# Patient Record
Sex: FEMALE | Race: WHITE | HISPANIC OR LATINO | Employment: UNEMPLOYED | URBAN - METROPOLITAN AREA
[De-identification: names, ages, dates, MRNs, and addresses within clinical notes are randomized per-mention and may not be internally consistent; named-entity substitution may affect disease eponyms.]

---

## 2023-12-02 ENCOUNTER — APPOINTMENT (OUTPATIENT)
Dept: ULTRASOUND IMAGING | Facility: CLINIC | Age: 23
End: 2023-12-02
Attending: FAMILY MEDICINE

## 2023-12-02 ENCOUNTER — HOSPITAL ENCOUNTER (EMERGENCY)
Facility: CLINIC | Age: 23
Discharge: HOME | End: 2023-12-02
Attending: FAMILY MEDICINE | Admitting: FAMILY MEDICINE

## 2023-12-02 VITALS
SYSTOLIC BLOOD PRESSURE: 105 MMHG | TEMPERATURE: 98.8 F | OXYGEN SATURATION: 99 % | WEIGHT: 138.8 LBS | DIASTOLIC BLOOD PRESSURE: 60 MMHG | RESPIRATION RATE: 16 BRPM | HEART RATE: 78 BPM

## 2023-12-02 DIAGNOSIS — Z3A.01 LESS THAN 8 WEEKS GESTATION OF PREGNANCY: ICD-10-CM

## 2023-12-02 LAB
ALBUMIN SERPL BCG-MCNC: 4.5 G/DL (ref 3.5–5.2)
ALBUMIN UR-MCNC: NEGATIVE MG/DL
ALP SERPL-CCNC: 62 U/L (ref 40–150)
ALT SERPL W P-5'-P-CCNC: 16 U/L (ref 0–50)
ANION GAP SERPL CALCULATED.3IONS-SCNC: 8 MMOL/L (ref 7–15)
APPEARANCE UR: CLEAR
AST SERPL W P-5'-P-CCNC: 20 U/L (ref 0–45)
BASOPHILS # BLD AUTO: 0.1 10E3/UL (ref 0–0.2)
BASOPHILS NFR BLD AUTO: 1 %
BILIRUB SERPL-MCNC: 0.3 MG/DL
BILIRUB UR QL STRIP: NEGATIVE
BUN SERPL-MCNC: 8.8 MG/DL (ref 6–20)
CALCIUM SERPL-MCNC: 9.1 MG/DL (ref 8.6–10)
CHLORIDE SERPL-SCNC: 104 MMOL/L (ref 98–107)
CLUE CELLS: ABNORMAL
COLOR UR AUTO: ABNORMAL
CREAT SERPL-MCNC: 0.52 MG/DL (ref 0.51–0.95)
DEPRECATED HCO3 PLAS-SCNC: 25 MMOL/L (ref 22–29)
EGFRCR SERPLBLD CKD-EPI 2021: >90 ML/MIN/1.73M2
EOSINOPHIL # BLD AUTO: 0.2 10E3/UL (ref 0–0.7)
EOSINOPHIL NFR BLD AUTO: 2 %
ERYTHROCYTE [DISTWIDTH] IN BLOOD BY AUTOMATED COUNT: 12.4 % (ref 10–15)
GLUCOSE SERPL-MCNC: 93 MG/DL (ref 70–99)
GLUCOSE UR STRIP-MCNC: NEGATIVE MG/DL
HCG INTACT+B SERPL-ACNC: 4186 MIU/ML
HCT VFR BLD AUTO: 36.8 % (ref 35–47)
HGB BLD-MCNC: 12.5 G/DL (ref 11.7–15.7)
HGB UR QL STRIP: NEGATIVE
IMM GRANULOCYTES # BLD: 0 10E3/UL
IMM GRANULOCYTES NFR BLD: 0 %
KETONES UR STRIP-MCNC: NEGATIVE MG/DL
LEUKOCYTE ESTERASE UR QL STRIP: NEGATIVE
LYMPHOCYTES # BLD AUTO: 2.7 10E3/UL (ref 0.8–5.3)
LYMPHOCYTES NFR BLD AUTO: 27 %
MCH RBC QN AUTO: 32 PG (ref 26.5–33)
MCHC RBC AUTO-ENTMCNC: 34 G/DL (ref 31.5–36.5)
MCV RBC AUTO: 94 FL (ref 78–100)
MONOCYTES # BLD AUTO: 0.7 10E3/UL (ref 0–1.3)
MONOCYTES NFR BLD AUTO: 7 %
MUCOUS THREADS #/AREA URNS LPF: PRESENT /LPF
NEUTROPHILS # BLD AUTO: 6.6 10E3/UL (ref 1.6–8.3)
NEUTROPHILS NFR BLD AUTO: 63 %
NITRATE UR QL: NEGATIVE
NRBC # BLD AUTO: 0 10E3/UL
NRBC BLD AUTO-RTO: 0 /100
PH UR STRIP: 6.5 [PH] (ref 5–7)
PLATELET # BLD AUTO: 302 10E3/UL (ref 150–450)
POTASSIUM SERPL-SCNC: 3.6 MMOL/L (ref 3.4–5.3)
PROT SERPL-MCNC: 7.5 G/DL (ref 6.4–8.3)
RBC # BLD AUTO: 3.91 10E6/UL (ref 3.8–5.2)
RBC URINE: 0 /HPF
SODIUM SERPL-SCNC: 137 MMOL/L (ref 135–145)
SP GR UR STRIP: 1.02 (ref 1–1.03)
SQUAMOUS EPITHELIAL: 8 /HPF
TRICHOMONAS, WET PREP: ABNORMAL
UROBILINOGEN UR STRIP-MCNC: NORMAL MG/DL
WBC # BLD AUTO: 10.2 10E3/UL (ref 4–11)
WBC URINE: <1 /HPF
WBC'S/HIGH POWER FIELD, WET PREP: ABNORMAL
YEAST, WET PREP: ABNORMAL

## 2023-12-02 PROCEDURE — 87210 SMEAR WET MOUNT SALINE/INK: CPT | Performed by: FAMILY MEDICINE

## 2023-12-02 PROCEDURE — 36415 COLL VENOUS BLD VENIPUNCTURE: CPT | Performed by: FAMILY MEDICINE

## 2023-12-02 PROCEDURE — 80053 COMPREHEN METABOLIC PANEL: CPT | Performed by: FAMILY MEDICINE

## 2023-12-02 PROCEDURE — 99284 EMERGENCY DEPT VISIT MOD MDM: CPT | Performed by: FAMILY MEDICINE

## 2023-12-02 PROCEDURE — 87591 N.GONORRHOEAE DNA AMP PROB: CPT | Performed by: FAMILY MEDICINE

## 2023-12-02 PROCEDURE — 76801 OB US < 14 WKS SINGLE FETUS: CPT

## 2023-12-02 PROCEDURE — 87491 CHLMYD TRACH DNA AMP PROBE: CPT | Performed by: FAMILY MEDICINE

## 2023-12-02 PROCEDURE — 84702 CHORIONIC GONADOTROPIN TEST: CPT | Performed by: FAMILY MEDICINE

## 2023-12-02 PROCEDURE — 85014 HEMATOCRIT: CPT | Performed by: FAMILY MEDICINE

## 2023-12-02 PROCEDURE — 99284 EMERGENCY DEPT VISIT MOD MDM: CPT | Mod: 25 | Performed by: FAMILY MEDICINE

## 2023-12-02 PROCEDURE — 81001 URINALYSIS AUTO W/SCOPE: CPT | Performed by: FAMILY MEDICINE

## 2023-12-02 ASSESSMENT — ENCOUNTER SYMPTOMS: PREGNANCY PROBLEM: 1

## 2023-12-02 ASSESSMENT — ACTIVITIES OF DAILY LIVING (ADL)
ADLS_ACUITY_SCORE: 35
ADLS_ACUITY_SCORE: 35

## 2023-12-03 LAB
C TRACH DNA SPEC QL NAA+PROBE: NEGATIVE
N GONORRHOEA DNA SPEC QL NAA+PROBE: NEGATIVE

## 2023-12-03 NOTE — ED PROVIDER NOTES
ED Provider Note  [facility]      History     Chief Complaint   Patient presents with    Abdominal Pain    Pregnancy Complications     The history is provided by the patient and medical records.   Pregnancy Complications      Ann Lemus is a 23 year old female with a PMHx of ectopic pregnancy with right salpingectomy presents to the ED for abdominal pain.  Patient reports a positive pregnancy test 2 days ago.     services used at bedside.  Patient states that she had a positive pregnancy test yesterday, and is now having a lot of pain where she had her surgery, in her lower abdomen and lower back.  Patient had a right salpingectomy 3 years ago, got pregnant a year after the surgery, but had a miscarriage.  Patient states that the doctor told her if she got pregnant she had to be checked out due to her history of ectopic pregnancy and miscarriage.    Past Medical History  No past medical history on file.  No past surgical history on file.  No current outpatient medications on file.    Allergies   Allergen Reactions    Amoxicillin      Family History  No family history on file.  Social History          A medically appropriate review of systems was performed with pertinent positives and negatives noted in the HPI, and all other systems negative.    Physical Exam   BP: 106/54  Pulse: 82  Temp: 98.8  F (37.1  C)  Resp: 16  Weight: 63 kg (138 lb 12.8 oz)  SpO2: 98 %  Physical Exam  Constitutional:       General: She is not in acute distress.     Appearance: Normal appearance. She is not diaphoretic.   HENT:      Head: Atraumatic.      Mouth/Throat:      Mouth: Mucous membranes are moist.   Eyes:      General: No scleral icterus.     Conjunctiva/sclera: Conjunctivae normal.   Cardiovascular:      Rate and Rhythm: Normal rate.      Heart sounds: Normal heart sounds.   Pulmonary:      Effort: No respiratory distress.      Breath sounds: Normal breath sounds.    Abdominal:      General: Abdomen is flat.      Tenderness: There is abdominal tenderness in the suprapubic area. There is no guarding or rebound.      Comments: Minimal tenderness in the suprapubic region with no guarding or rebound noted.   Genitourinary:     Vagina: Normal.      Cervix: No cervical motion tenderness.      Uterus: Tender.       Adnexa:         Right: No mass or tenderness.          Left: No mass or tenderness.     Musculoskeletal:      Cervical back: Neck supple.   Skin:     General: Skin is warm.      Findings: No rash.   Neurological:      Mental Status: She is alert.           ED Course, Procedures, & Data      Procedures     Results for orders placed or performed during the hospital encounter of 12/02/23   US OB <14 Weeks W Transvaginal     Status: None    Narrative    EXAM: US OB <14 WEEKS WITH TRANSVAGINAL SINGLE  LOCATION: [facility]  DATE: 12/2/2023    INDICATION: Pelvic pain.  COMPARISON: None.  TECHNIQUE: Transabdominal scans were performed. Endovaginal ultrasound was performed to better visualize the embryo.    FINDINGS:  UTERUS: Anteverted gravid uterus with a single intrauterine gestational sac, mean gestational sac diameter measures 0.6 cm corresponding to 5 weeks 1 day. Yolk sac identified. No fetal pole or cardiac activity at this stage of gestation. Recommend   clinical correlation and close follow-up.  CRL: Measures not yet visualized.  FETAL HEART RATE: Not yet visualized.  AMNIOTIC FLUID: Normal.  PLACENTA: Not yet visualized.    RIGHT OVARY: Normal measuring 1.8 x 3.4 x 1.7 cm, with normal Doppler vascularity.  LEFT OVARY: Normal measuring 2.7 x 5.4 x 1.7 cm containing a corpus luteum measuring 1.4 x 1.7 x 1.4 cm. Normal Doppler vascularity.    Trace pelvic free fluid.      Impression    IMPRESSION:   1.  Tiny intrauterine gestational sac without a fetal pole or cardiac activity at this stage of early gestation. Yolk sac  identified. Recommend clinical correlation and close follow-up.    2.  Both ovaries are normal, with normal Doppler vascularity. Corpus luteum on the left.    3.  Trace amount of pelvic free fluid.       Comprehensive metabolic panel     Status: Normal   Result Value Ref Range    Sodium 137 135 - 145 mmol/L    Potassium 3.6 3.4 - 5.3 mmol/L    Carbon Dioxide (CO2) 25 22 - 29 mmol/L    Anion Gap 8 7 - 15 mmol/L    Urea Nitrogen 8.8 6.0 - 20.0 mg/dL    Creatinine 0.52 0.51 - 0.95 mg/dL    GFR Estimate >90 >60 mL/min/1.73m2    Calcium 9.1 8.6 - 10.0 mg/dL    Chloride 104 98 - 107 mmol/L    Glucose 93 70 - 99 mg/dL    Alkaline Phosphatase 62 40 - 150 U/L    AST 20 0 - 45 U/L    ALT 16 0 - 50 U/L    Protein Total 7.5 6.4 - 8.3 g/dL    Albumin 4.5 3.5 - 5.2 g/dL    Bilirubin Total 0.3 <=1.2 mg/dL   HCG quantitative pregnancy     Status: Abnormal   Result Value Ref Range    hCG Quantitative 4,186 (H) <5 mIU/mL   UA with Microscopic reflex to Culture     Status: Abnormal    Specimen: Urine, Midstream   Result Value Ref Range    Color Urine Light Yellow Colorless, Straw, Light Yellow, Yellow    Appearance Urine Clear Clear    Glucose Urine Negative Negative mg/dL    Bilirubin Urine Negative Negative    Ketones Urine Negative Negative mg/dL    Specific Gravity Urine 1.016 1.003 - 1.035    Blood Urine Negative Negative    pH Urine 6.5 5.0 - 7.0    Protein Albumin Urine Negative Negative mg/dL    Urobilinogen Urine Normal Normal, 2.0 mg/dL    Nitrite Urine Negative Negative    Leukocyte Esterase Urine Negative Negative    Mucus Urine Present (A) None Seen /LPF    RBC Urine 0 <=2 /HPF    WBC Urine <1 <=5 /HPF    Squamous Epithelials Urine 8 (H) <=1 /HPF    Narrative    Urine Culture not indicated   CBC with platelets and differential     Status: None   Result Value Ref Range    WBC Count 10.2 4.0 - 11.0 10e3/uL    RBC Count 3.91 3.80 - 5.20 10e6/uL    Hemoglobin 12.5 11.7 - 15.7 g/dL    Hematocrit 36.8 35.0 - 47.0 %    MCV 94 78  - 100 fL    MCH 32.0 26.5 - 33.0 pg    MCHC 34.0 31.5 - 36.5 g/dL    RDW 12.4 10.0 - 15.0 %    Platelet Count 302 150 - 450 10e3/uL    % Neutrophils 63 %    % Lymphocytes 27 %    % Monocytes 7 %    % Eosinophils 2 %    % Basophils 1 %    % Immature Granulocytes 0 %    NRBCs per 100 WBC 0 <1 /100    Absolute Neutrophils 6.6 1.6 - 8.3 10e3/uL    Absolute Lymphocytes 2.7 0.8 - 5.3 10e3/uL    Absolute Monocytes 0.7 0.0 - 1.3 10e3/uL    Absolute Eosinophils 0.2 0.0 - 0.7 10e3/uL    Absolute Basophils 0.1 0.0 - 0.2 10e3/uL    Absolute Immature Granulocytes 0.0 <=0.4 10e3/uL    Absolute NRBCs 0.0 10e3/uL   Wet prep     Status: Abnormal    Specimen: Vagina; Swab   Result Value Ref Range    Trichomonas Absent Absent    Yeast Absent Absent    Clue Cells Absent Absent    WBCs/high power field 1+ (A) None   CBC with platelets differential     Status: None    Narrative    The following orders were created for panel order CBC with platelets differential.  Procedure                               Abnormality         Status                     ---------                               -----------         ------                     CBC with platelets and d...[[id number]]                      Final result                 Please view results for these tests on the individual orders.     Medications - No data to display  Labs Ordered and Resulted from Time of ED Arrival to Time of ED Departure   HCG QUANTITATIVE PREGNANCY - Abnormal       Result Value    hCG Quantitative 4,186 (*)    ROUTINE UA WITH MICROSCOPIC REFLEX TO CULTURE - Abnormal    Color Urine Light Yellow      Appearance Urine Clear      Glucose Urine Negative      Bilirubin Urine Negative      Ketones Urine Negative      Specific Gravity Urine 1.016      Blood Urine Negative      pH Urine 6.5      Protein Albumin Urine Negative      Urobilinogen Urine Normal      Nitrite Urine Negative      Leukocyte Esterase Urine Negative      Mucus Urine Present (*)     RBC Urine 0      WBC  Urine <1      Squamous Epithelials Urine 8 (*)    WET PREPARATION - Abnormal    Trichomonas Absent      Yeast Absent      Clue Cells Absent      WBCs/high power field 1+ (*)    COMPREHENSIVE METABOLIC PANEL - Normal    Sodium 137      Potassium 3.6      Carbon Dioxide (CO2) 25      Anion Gap 8      Urea Nitrogen 8.8      Creatinine 0.52      GFR Estimate >90      Calcium 9.1      Chloride 104      Glucose 93      Alkaline Phosphatase 62      AST 20      ALT 16      Protein Total 7.5      Albumin 4.5      Bilirubin Total 0.3     CBC WITH PLATELETS AND DIFFERENTIAL    WBC Count 10.2      RBC Count 3.91      Hemoglobin 12.5      Hematocrit 36.8      MCV 94      MCH 32.0      MCHC 34.0      RDW 12.4      Platelet Count 302      % Neutrophils 63      % Lymphocytes 27      % Monocytes 7      % Eosinophils 2      % Basophils 1      % Immature Granulocytes 0      NRBCs per 100 WBC 0      Absolute Neutrophils 6.6      Absolute Lymphocytes 2.7      Absolute Monocytes 0.7      Absolute Eosinophils 0.2      Absolute Basophils 0.1      Absolute Immature Granulocytes 0.0      Absolute NRBCs 0.0     CHLAMYDIA TRACHOMATIS PCR   NEISSERIA GONORRHOEAE PCR     US OB <14 Weeks W Transvaginal   Final Result   IMPRESSION:    1.  Tiny intrauterine gestational sac without a fetal pole or cardiac activity at this stage of early gestation. Yolk sac identified. Recommend clinical correlation and close follow-up.      2.  Both ovaries are normal, with normal Doppler vascularity. Corpus luteum on the left.      3.  Trace amount of pelvic free fluid.                   Critical care was not performed.     Medical Decision Making  The patient's presentation was of moderate complexity (an acute illness with systemic symptoms).    The patient's evaluation involved:  ordering and/or review of 3+ test(s) in this encounter (see separate area of note for details)    The patient's management necessitated only low risk treatment.    Assessment & Plan         I have reviewed the nursing notes. I have reviewed the findings, diagnosis, plan and need for follow up with the patient.    Patient in 5 weeks gestation with mild suprapubic pain at this time concern for ectopic pregnancy versus possible early miscarriage patient has had both ectopic pregnancy and miscarriage and is concerned about these however at this time too early to determine with this pregnancy patient understands importance of close follow-up with recheck of hCG in 2 days and follow-up ultrasound in approximately 1 to 2 weeks.  Return to the emergency room if increased pain or bleeding.    Final diagnoses:   Less than 8 weeks gestation of pregnancy   I, Gonzalo Brock, am serving as a trained medical scribe to document services personally performed by Isaiah Wu MD, based to the provider's statements to me.     I, Isaiah Wu MD, was physically present and have reviewed and verified the accuracy of this note documented by Gonzalo Brock.     Isaiah Wu MD  [facility] EMERGENCY DEPARTMENT  12/2/2023     Isaiah Wu MD  12/02/23 [id number]

## 2023-12-03 NOTE — DISCHARGE INSTRUCTIONS
Discharge to home plan on follow-up Monday for recheck of your beta hCG hormone level.  Return if marked increase in pain or any bleeding.

## 2023-12-03 NOTE — ED TRIAGE NOTES
Pt reports  positive  home pregnancy test two days ago.  Abdominal Pain started yesterday which has worsen . Pt report  she had an ectopic pregnancy in R ovary two years.ago..which led to the surgical  removal of her R ovary and tube     Triage Assessment (Adult)       Row Name 12/02/23 2039          Triage Assessment    Airway WDL WDL        Respiratory WDL    Respiratory WDL WDL        Skin Circulation/Temperature WDL    Skin Circulation/Temperature WDL WDL        Cardiac WDL    Cardiac WDL WDL        Peripheral/Neurovascular WDL    Peripheral Neurovascular WDL WDL        Cognitive/Neuro/Behavioral WDL    Cognitive/Neuro/Behavioral WDL WDL

## 2023-12-04 ENCOUNTER — APPOINTMENT (OUTPATIENT)
Dept: INTERPRETER SERVICES | Facility: CLINIC | Age: 23
End: 2023-12-04
Payer: MEDICAID

## 2023-12-04 ENCOUNTER — TELEPHONE (OUTPATIENT)
Dept: OBGYN | Facility: CLINIC | Age: 23
End: 2023-12-04
Payer: MEDICAID

## 2023-12-04 DIAGNOSIS — Z32.01 PREGNANCY TEST POSITIVE: Primary | ICD-10-CM

## 2023-12-04 NOTE — TELEPHONE ENCOUNTER
Per 12/2 ED note, pt needs to have HCG rechecked today, 12/4. No further lab orders on file. Ordered HCG.     ED also recommended ultrasound follow-up in 1-2 weeks. Ordered US.    Called pt to let her know she needs to get her BHCG drawn today and to schedule her initial prenatal appts. Pt had gone to a lab today that was not a [facility] lab. Gave her the address + hours for the outpatient lab; she will try to go today before it closes.  Scheduled pt for OBI, US, and CNM appt.

## 2023-12-04 NOTE — TELEPHONE ENCOUNTER
[facility] Call Center    Phone Message    May a detailed message be left on voicemail: yes     Reason for Call: Appointment Intake    Referring Provider Name: Isaiah Wu MD   Diagnosis and/or Symptoms: pt has a referral for her first prenatal visit, writer unsure how to schedule as ED recommended 1-2 day follow up due to possible ectopic pregnancy/hx of miscarriage , please call pt to discuss, thank you    Action Taken: Message routed to:  Other: whs    Travel Screening: Not Applicable

## 2023-12-05 ENCOUNTER — LAB (OUTPATIENT)
Dept: LAB | Facility: CLINIC | Age: 23
End: 2023-12-05

## 2023-12-05 DIAGNOSIS — Z32.01 PREGNANCY TEST POSITIVE: ICD-10-CM

## 2023-12-05 LAB — HCG INTACT+B SERPL-ACNC: 9686 MIU/ML

## 2023-12-05 PROCEDURE — 36415 COLL VENOUS BLD VENIPUNCTURE: CPT

## 2023-12-05 PROCEDURE — 84702 CHORIONIC GONADOTROPIN TEST: CPT

## 2023-12-07 ENCOUNTER — VIRTUAL VISIT (OUTPATIENT)
Dept: OBGYN | Facility: CLINIC | Age: 23
End: 2023-12-07
Attending: ADVANCED PRACTICE MIDWIFE
Payer: MEDICAID

## 2023-12-07 DIAGNOSIS — Z87.59 HISTORY OF ECTOPIC PREGNANCY: Primary | ICD-10-CM

## 2023-12-07 PROBLEM — O00.90 ECTOPIC PREGNANCY: Status: ACTIVE | Noted: 2021-10-29

## 2023-12-07 PROBLEM — Z60.3 IMMIGRANT WITH LANGUAGE DIFFICULTY: Status: ACTIVE | Noted: 2021-08-22

## 2023-12-07 PROBLEM — Z59.71 DOES NOT HAVE HEALTH INSURANCE: Status: ACTIVE | Noted: 2021-08-05

## 2023-12-07 RX ORDER — PRENATAL VIT/IRON FUM/FOLIC AC 27MG-0.8MG
1 TABLET ORAL
COMMUNITY
Start: 2022-11-14

## 2023-12-07 RX ORDER — ACETAMINOPHEN 500 MG
500 TABLET ORAL
Status: ON HOLD | COMMUNITY
Start: 2023-10-09 | End: 2024-05-20

## 2023-12-07 NOTE — PROGRESS NOTES
ABDIRIZAK RN Prenatal Intake note  Subjective     23 year old female newly pregnant.  LMP: 10/22/23.    exact and regular cycles  Pregnancy is planned.    Partner/support person name and relationship - Andrew/.        Symptoms since LMP include spotting and mood changes. Patient has tried these relief   measures: increased rest.    OB HISTORY  OB History    Para Term  AB Living   4 1 1 0 2 1   SAB IAB Ectopic Multiple Live Births   1 0 1 0 1      # Outcome Date GA Lbr Mauricio/2nd Weight Sex Delivery Anes PTL Lv   4 Current            3 2022     SAB      2 Ectopic 21     ECTOPIC   FD      Birth Comments: s/p right salpingectomy with removal of ruptured ectopic pregnancy   1 Term 18    M CS-LTranv EPI  ALEJANDRO      Birth Comments: In [facility]      Name: Alexandr Monet         OB COMPLICATIONS  First Pregnancy No  GDM No  HTNNo  Preeclampsia No   labor No   birth No   birth Yes: 2018 pregnancy , due to oligohydramnios (per pt report)  PP hemorrhage No  Retained placenta No  PP mood disorder No  Fetal anomaly No  FGR No  Macrosomia  No  3rd or 4th degree laceration  No  Shoulder dystocia No  NICU admit No       PERSONAL/SOCIAL HISTORY  *updated all tabs in history    lives with their family.  Employment: Unemployed.   Her partner works as a .   MENTAL HEALTH HISTORY:  none      - Current Medications reviewed.   Current Outpatient Medications   Medication Sig Dispense Refill    acetaminophen (TYLENOL) 500 MG tablet Take 500 mg by mouth      Prenatal Vit-Fe Fumarate-FA (PRENATAL MULTIVITAMIN W/IRON) 27-0.8 MG tablet Take 1 tablet by mouth             - Co-morbids reviewed   Past Medical History:   Diagnosis Date    History of ectopic pregnancy 10/29/2021    required R salpingectomy due to rupture       - Genetic/Infection questionnaire completed, risks include none. Discussed genetic screening options, patient does desire first trimester genetic screening  Pt  does not  have a recent known exposure to Parvo or CMV so IgG/IgM testing WILL NOT be ordered.   Flu Vaccine.  Patient declined, do not offer  COVID Vaccine: received initial covid vaccines and most recent booster  - Discussed expectations for routine prenatal care and scheduling.  -Discussed highlights from The Expectant Family booklet on warning signs, safe pregnancy and prevention of infections diseases, nutrition and exercise.  - Patient was encouraged to start prenatal vitamins as tolerated, if experiencing nausea and vomiting then OK to switch to folic acid only at this time.    -Additional items: Has been given information regarding WIC  -Reconciled and reviewed problem list  -Pt scheduled for dating US and NOB on 12/14    Stephanie Mcguire RN

## 2023-12-07 NOTE — LETTER
2023       RE: Ann Lemus  [address]  [place] [address]     Dear Colleague,    Thank you for referring your patient, Ann Lemus, to the [facility]. Please see a copy of my visit note below.    ABDIRIZAK RN Prenatal Intake note  Subjective     23 year old female newly pregnant.  LMP: 10/22/23.    exact and regular cycles  Pregnancy is planned.    Partner/support person name and relationship - Andrew/.        Symptoms since LMP include spotting and mood changes. Patient has tried these relief   measures: increased rest.    OB HISTORY  OB History    Para Term  AB Living   4 1 1 0 2 1   SAB IAB Ectopic Multiple Live Births   1 0 1 0 1      # Outcome Date GA Lbr Mauricio/2nd Weight Sex Delivery Anes PTL Lv   4 Current            3 2022     SAB      2 Ectopic 22     ECTOPIC   FD      Birth Comments: s/p right salpingectomy with removal of ruptured ectopic pregnancy   1 Term 19    M CS-LTranv EPI  ALEJANDRO      Birth Comments: In [facility]      Name: Alexandr Monet         OB COMPLICATIONS  First Pregnancy No  GDM No  HTNNo  Preeclampsia No   labor No   birth No   birth Yes: 2018 pregnancy , due to oligohydramnios (per pt report)  PP hemorrhage No  Retained placenta No  PP mood disorder No  Fetal anomaly No  FGR No  Macrosomia  No  3rd or 4th degree laceration  No  Shoulder dystocia No  NICU admit No       PERSONAL/SOCIAL HISTORY  *updated all tabs in history    lives with their family.  Employment: Unemployed.   Her partner works as a .   MENTAL HEALTH HISTORY:  none      - Current Medications reviewed.   Current Outpatient Medications   Medication Sig Dispense Refill    acetaminophen (TYLENOL) 500 MG tablet Take 500 mg by mouth      Prenatal Vit-Fe Fumarate-FA (PRENATAL MULTIVITAMIN W/IRON) 27-0.8 MG tablet Take 1  tablet by mouth             - Co-morbids reviewed   Past Medical History:   Diagnosis Date    History of ectopic pregnancy 10/29/2021    required R salpingectomy due to rupture       - Genetic/Infection questionnaire completed, risks include none. Discussed genetic screening options, patient does desire first trimester genetic screening  Pt  does not have a recent known exposure to Parvo or CMV so IgG/IgM testing WILL NOT be ordered.   Flu Vaccine.  Patient declined, do not offer  COVID Vaccine: received initial covid vaccines and most recent booster  - Discussed expectations for routine prenatal care and scheduling.  -Discussed highlights from The Expectant Family booklet on warning signs, safe pregnancy and prevention of infections diseases, nutrition and exercise.  - Patient was encouraged to start prenatal vitamins as tolerated, if experiencing nausea and vomiting then OK to switch to folic acid only at this time.    -Additional items: Has been given information regarding WIC  -Reconciled and reviewed problem list  -Pt scheduled for dating US and NOB on 12/14    Stephanie Mcguire RN

## 2023-12-07 NOTE — PATIENT INSTRUCTIONS
Carol por confiarnos harley cuidado!    Si necesita contactarnos para preguntas sobre:  Síntomas, programación y preguntas médicas; No urgente (respuesta de 2 a 3 días) Mensaje john Ortega Urgent[place] (necesita respuesta hosabina) [phone] (si responde después de las 3:30 p. m. del día siguiente)  Recetas: llame a harley farmacia  Facturación: [place] [phone] o M Médicos: [phone]   Learning About Pregnancy  Your Care Instructions     Your health in the early weeks of your pregnancy is particularly important for your baby's health. Take good care of yourself. Anything you do that harms your body can also harm your baby.  Make sure to go to all of your doctor appointments. Regular checkups will help keep you and your baby healthy.  How can you care for yourself at home?  Diet    Eat a balanced diet. Make sure your diet includes plenty of beans, peas, and leafy green vegetables.     Do not skip meals or go for many hours without eating. If you are nauseated, try to eat a small, healthy snack every 2 to 3 hours.     Do not eat fish that has a high level of mercury, such as shark, swordfish, or mackerel. Do not eat more than one can of tuna each week.     Drink plenty of fluids. If you have kidney, heart, or liver disease and have to limit fluids, talk with your doctor before you increase the amount of fluids you drink.     Cut down on caffeine, such as coffee, tea, and cola.     Do not drink alcohol, such as beer, wine, or hard liquor.     Take a multivitamin that contains at least 400 micrograms (mcg) of folic acid to help prevent birth defects. Fortified cereal and whole wheat bread are good additional sources of folic acid.     Increase the calcium in your diet. Try to drink a quart of skim milk each day. You may also take calcium supplements and choose foods such as cheese and yogurt.   Lifestyle    Make sure you go to your follow-up appointments.     Get plenty of rest. You may be unusually tired while you are  pregnant.     Get at least 30 minutes of exercise on most days of the week. Walking is a good choice. If you have not exercised in the past, start out slowly. Take several short walks each day.     Do not smoke. If you need help quitting, talk to your doctor about stop-smoking programs. These can increase your chances of quitting for good.     Do not touch cat feces or litter boxes. Also, wash your hands after you handle raw meat, and fully cook all meat before you eat it. Wear gloves when you work in the yard or garden, and wash your hands well when you are done. Cat feces, raw or undercooked meat, and contaminated dirt can cause an infection that may harm your baby or lead to a miscarriage.     Avoid things that can make your body too hot and may be harmful to your baby, such as a hot tub or sauna. Or talk with your doctor before doing anything that raises your body temperature. Your doctor can tell you if it's safe.     Avoid chemical fumes, paint fumes, or poisons.     Do not use illegal drugs, marijuana, or alcohol.   Medicines    Review all of your medicines with your doctor. Some of your routine medicines may need to be changed to protect your baby.     Use acetaminophen (Tylenol) to relieve minor problems, such as a mild headache or backache or a mild fever with cold symptoms. Do not use nonsteroidal anti-inflammatory drugs (NSAIDs), such as ibuprofen (Advil, Motrin) or naproxen (Aleve), unless your doctor says it is okay.     Do not take two or more pain medicines at the same time unless the doctor told you to. Many pain medicines have acetaminophen, which is Tylenol. Too much acetaminophen (Tylenol) can be harmful.     Take your medicines exactly as prescribed. Call your doctor if you think you are having a problem with your medicine.   To manage morning sickness    If you feel sick when you first wake up, try eating a small snack (such as crackers) before you get out of bed. Allow some time to digest the  "snack, and then get out of bed slowly.     Do not skip meals or go for long periods without eating. An empty stomach can make nausea worse.     Eat small, frequent meals instead of three large meals each day.     Drink plenty of fluids.     Eat foods that are high in protein but low in fat.     If you are taking iron supplements, ask your doctor if they are necessary. Iron can make nausea worse.     Avoid any smells, such as coffee, that make you feel sick.     Get lots of rest. Morning sickness may be worse when you are tired.   Follow-up care is a key part of your treatment and safety. Be sure to make and go to all appointments, and call your doctor if you are having problems. It's also a good idea to know your test results and keep a list of the medicines you take.  Where can you learn more?  Go to [email or web address]://[email or web address]  Enter E868 in the search box to learn more about \"Learning About Pregnancy.\"  Current as of: July 11, 2023               Content Version: 13.8    6575-5397 [employer].   Care instructions adapted under license by your healthcare professional. If you have questions about a medical condition or this instruction, always ask your healthcare professional. [employer] disclaims any warranty or liability for your use of this information.      Weeks 6 to 10 of Your Pregnancy: Care Instructions  During these weeks of pregnancy, your body goes through many changes. You may start to feel different, both in your body and your emotions. Each pregnancy is different, so there's no \"right\" way to feel. These early weeks are a time to make healthy choices for you and your pregnancy.    Take a daily prenatal vitamin. Choose one with folic acid in it.   Avoid alcohol, tobacco, and drugs (including marijuana). If you need help quitting, talk to your doctor.     Drink plenty of liquids.  Be sure to drink enough water. And limit sodas, other sweetened drinks, and caffeine.     " "Choose foods that are good sources of calcium, iron, and folate.  You can try dairy products, dark leafy greens, fortified orange juice and cereals, almonds, broccoli, dried fruit, and beans.     Avoid foods that may be harmful.  Don't eat raw meat, deli meat, raw seafood, or raw eggs. Avoid soft cheese and unpasteurized dairy, like Brie and blue cheese. And don't eat fish that contains a lot of mercury, like shark and swordfish.     Don't touch chayito litter or cat poop.  They can cause an infection that could be harmful during pregnancy.     Avoid things that can make your body too hot.  For example, avoid hot tubs and saunas.     Soothe morning sickness.  Try eating 5 or 6 small meals a day, getting some fresh air, or using jack to control symptoms.     Ask your doctor about flu and COVID-19 shots.  Getting them can help protect against infection.   Follow-up care is a key part of your treatment and safety. Be sure to make and go to all appointments, and call your doctor if you are having problems. It's also a good idea to know your test results and keep a list of the medicines you take.  Where can you learn more?  Go to https://www.[employer].net/patiented  Enter G112 in the search box to learn more about \"Weeks 6 to 10 of Your Pregnancy: Care Instructions.\"  Current as of: July 11, 2023               Content Version: 13.8    7692-2597 [employer].   Care instructions adapted under license by your healthcare professional. If you have questions about a medical condition or this instruction, always ask your healthcare professional. [employer] disclaims any warranty or liability for your use of this information.         Managing Morning Sickness (01:55)  Your health professional recommends that you watch this short online health video.  Learn tips for dealing with morning sickness, no matter what time of day you have it.  Purpose:  Gives tips for managing morning sickness, including " eating small low-fat meals and avoiding caffeine and spicy food.  Goal:  The user will learn tips for dealing with morning sickness during pregnancy.     How to watch the video    Scan the QR code   OR Visit the website    [email or web address]   Current as of: July 11, 2023               Content Version: 13.8    8664-3668 [employer].   Care instructions adapted under license by your healthcare professional. If you have questions about a medical condition or this instruction, always ask your healthcare professional. [employer] disclaims any warranty or liability for your use of this information.      Pregnancy and Heartburn: Care Instructions  Overview     Heartburn is a common problem during pregnancy.  Heartburn happens when stomach acid backs up into the tube that carries food to the stomach. This tube is called the esophagus. Early in pregnancy, heartburn is caused by hormone changes that slow down digestion. Later on, it's also caused by the large uterus pushing up on the stomach.  Even though you can't fix the cause, there are things you can do to get relief. Treating heartburn during pregnancy focuses first on making lifestyle changes, like changing what and how you eat, and on taking medicines.  Heartburn usually improves or goes away after childbirth.  Follow-up care is a key part of your treatment and safety. Be sure to make and go to all appointments, and call your doctor if you are having problems. It's also a good idea to know your test results and keep a list of the medicines you take.  How can you care for yourself at home?  Eat small, frequent meals.  Avoid foods that make your symptoms worse, such as chocolate, peppermint, and spicy foods. Avoid drinks with caffeine, such as coffee, tea, and sodas.  Avoid bending over or lying down after meals.  Take a short walk after you eat.  If heartburn is a problem at night, do not eat for 2 hours before  "bedtime.  Take antacids like Mylanta, Maalox, Rolaids, or Tums. Do not take antacids that have sodium bicarbonate, magnesium trisilicate, or aspirin. Be careful when you take over-the-counter antacid medicines. Many of these medicines have aspirin in them. While you are pregnant, do not take aspirin or medicines that contain aspirin unless your doctor says it is okay.  If you're not getting relief, talk to your doctor. You may be able to take a stronger acid-reducing medicine.  When should you call for help?   Call your doctor now or seek immediate medical care if:    You have new or worse belly pain.     You are vomiting.   Watch closely for changes in your health, and be sure to contact your doctor if:    You have new or worse symptoms of reflux.     You are losing weight.     You have trouble or pain swallowing.     You do not get better as expected.   Where can you learn more?  Go to https://www.[email or web address].GoGuide/patiented  Enter U946 in the search box to learn more about \"Pregnancy and Heartburn: Care Instructions.\"  Current as of: July 11, 2023               Content Version: 13.8    5803-1828 [employer].   Care instructions adapted under license by your healthcare professional. If you have questions about a medical condition or this instruction, always ask your healthcare professional. [employer] disclaims any warranty or liability for your use of this information.      Constipation: Care Instructions  Overview     Constipation means that you have a hard time passing stools (bowel movements). People pass stools from 3 times a day to once every 3 days. What is normal for you may be different. Constipation may occur with pain in the rectum and cramping. The pain may get worse when you try to pass stools. Sometimes there are small amounts of bright red blood on toilet paper or the surface of stools. This is because of enlarged veins near the rectum (hemorrhoids).  A few changes in your " diet and lifestyle may help you avoid ongoing constipation. Your doctor may also prescribe medicine to help loosen your stool.  Some medicines can cause constipation. These include pain medicines and antidepressants. Tell your doctor about all the medicines you take. Your doctor may want to make a medicine change to ease your symptoms.  Follow-up care is a key part of your treatment and safety. Be sure to make and go to all appointments, and call your doctor if you are having problems. It's also a good idea to know your test results and keep a list of the medicines you take.  How can you care for yourself at home?  Drink plenty of fluids. If you have kidney, heart, or liver disease and have to limit fluids, talk with your doctor before you increase the amount of fluids you drink.  Include high-fiber foods in your diet each day. These include fruits, vegetables, beans, and whole grains.  Get at least 30 minutes of exercise on most days of the week. Walking is a good choice. You also may want to do other activities, such as running, swimming, cycling, or playing tennis or team sports.  Take a fiber supplement, such as Citrucel or Metamucil, every day. Read and follow all instructions on the label.  Schedule time each day for a bowel movement. A daily routine may help. Take your time having a bowel movement, but don't sit for more than 10 minutes at a time. And don't strain too much.  Support your feet with a small step stool when you sit on the toilet. This helps flex your hips and places your pelvis in a squatting position.  Your doctor may recommend an over-the-counter laxative to relieve your constipation. Examples are Milk of Magnesia and MiraLax. Read and follow all instructions on the label. Do not use laxatives on a long-term basis.  When should you call for help?   Call your doctor now or seek immediate medical care if:    You have new or worse belly pain.     You have new or worse nausea or vomiting.     You  "have blood in your stools.   Watch closely for changes in your health, and be sure to contact your doctor if:    Your constipation is getting worse.     You do not get better as expected.   Where can you learn more?  Go to https://www.[employer]Squ[employer].net/patiented  Enter P343 in the search box to learn more about \"Constipation: Care Instructions.\"  Current as of: March 21, 2023               Content Version: 13.8    9727-3030 [employer].   Care instructions adapted under license by your healthcare professional. If you have questions about a medical condition or this instruction, always ask your healthcare professional. [employer] disclaims any warranty or liability for your use of this information.      Learning About High-Iron Foods  What foods are high in iron?     The foods you eat contain nutrients, such as vitamins and minerals. Iron is a nutrient. Your body needs the right amount to stay healthy and work as it should. You can use the list below to help you make choices about which foods to eat.  Here are some foods that contain iron. They have 1 to 2 milligrams of iron per serving.  Fruits  Figs (dried), 5 figs  Vegetables  Asparagus (canned), 6 pandya  Kerri, beet, Swiss chard, or turnip greens, 1 cup  Dried peas, cooked,   cup  Seaweed, spirulina (dried),   cup  Spinach, (cooked)   cup or (raw) 1 cup  Grains  Cereals, fortified with iron, 1 cup  Grits (instant, cooked), fortified with iron,   cup  Meats and other protein foods  Beans (kidney, lima, navy, white), canned or cooked,   cup  Beef or lamb, 3 oz  Chicken giblets, 3 oz  Chickpeas (garbanzo beans),   cup  Liver of beef, lamb, or pork, 3 oz  Oysters (cooked), 3 oz  Sardines (canned), 3 oz  Soybeans (boiled),   cup  Tofu (firm),   cup  Work with your doctor to find out how much of this nutrient you need. Depending on your health, you may need more or less of it in your diet.  Where can you learn more?  Go to " "https://www.[employer].net/patiented  Enter R005 in the search box to learn more about \"Learning About High-Iron Foods.\"  Current as of: February 28, 2023               Content Version: 13.8 2006-2023 [employer].   Care instructions adapted under license by your healthcare professional. If you have questions about a medical condition or this instruction, always ask your healthcare professional. [employer] disclaims any warranty or liability for your use of this information.      Rh Antibodies Screening During Pregnancy: About This Test  What is it?     The Rh antibodies screening test is a blood test. It checks your blood for Rh antibodies. If you have Rh-negative blood and have been exposed to Rh-positive blood, your immune system may make antibodies to attack the Rh-positive blood. When a pregnant woman has these antibodies, it is called Rh sensitization.  Why is this test done?  The Rh antibodies screening test is done during pregnancy to find out if your baby is at risk for Rh disease. This can happen if you have Rh-negative blood and your baby has Rh-positive blood. If your Rh-negative blood mixes with Rh-positive blood, your immune system will make antibodies to attack the Rh-positive blood.  During pregnancy, these antibodies could attach to the baby's red blood cells. This can cause your baby to have serious health problems. The results of this test will help your doctor know how to best care for you and your baby during your pregnancy.  How do you prepare for the test?  In general, there's nothing you have to do before this test, unless your doctor tells you to.  How is the test done?  A health professional uses a needle to take a blood sample, usually from the arm.  What happens after the test?  You will probably be able to go home right away. It depends on the reason for the test.  You can go back to your usual activities right away.  Follow-up care is a key part of your " "treatment and safety. Be sure to make and go to all appointments, and call your doctor if you are having problems. It's also a good idea to keep a list of the medicines you take. Ask your doctor when you can expect to have your test results.  Where can you learn more?  Go to https://www.[employer].net/patiented  Enter P722 in the search box to learn more about \"Rh Antibodies Screening During Pregnancy: About This Test.\"  Current as of: 2023               Content Version: 13.8    1167-5058 [employer].   Care instructions adapted under license by your healthcare professional. If you have questions about a medical condition or this instruction, always ask your healthcare professional. [employer] disclaims any warranty or liability for your use of this information.      Learning About Preventing Rh Disease  What is Rh disease?     Rh disease can be a serious problem in pregnancy. It happens when substances called antibodies in the mother's blood cause red blood cells in her baby's blood to be destroyed. This can occur when the blood types of a mother and her baby do not match.  All blood has an Rh factor. This is what makes a blood type positive or negative. When you are Rh-negative, your baby may be Rh-negative or Rh-positive. If your baby has Rh-positive blood and it mixes with yours, your body will make antibodies. This is called Rh sensitization.  Most of the time, this is not a problem in a first pregnancy. But in future pregnancies, it could cause Rh disease.  A  with Rh disease has mild anemia and may have jaundice. In severe cases, anemia, jaundice, and swelling can be very dangerous or fatal. Some babies need to be delivered early. Some need special care in the NICU. A very sick baby will need a blood transfusion before or after birth.  Fortunately, Rh sensitization is usually easy to prevent.  That's why it's important to get your Rh status checked in your first " "trimester. It doesn't cause any warning signs. A blood test is the only way to know if you are Rh-sensitive or are at risk for it.  How can you prevent Rh disease?  If you are Rh-negative, your doctor gives you an Rh immune globulin shot (such as RhoGAM). It helps prevent your body from making the antibodies that attack your baby's red blood cells.  Timing is important. You need the shot at certain times during your pregnancy. And you need one anytime there is a chance that your baby's blood might mix with yours. That can happen with certain prenatal tests or when you have pregnancy bleeding, such as:  Right after any pregnancy loss, amniocentesis, or CVS testing.  After turning of a breech baby.  Before and maybe after childbirth. Your doctor gives you a shot around week 28. If your  is Rh-positive, you will have another shot.  Follow-up care is a key part of your treatment and safety. Be sure to make and go to all appointments, and call your doctor if you are having problems. It's also a good idea to know your test results and keep a list of the medicines you take.  Where can you learn more?  Go to https://www.[employer].net/patiented  Enter W177 in the search box to learn more about \"Learning About Preventing Rh Disease.\"  Current as of: 2023               Content Version: 13.8    9360-4191 [employer].   Care instructions adapted under license by your healthcare professional. If you have questions about a medical condition or this instruction, always ask your healthcare professional. [employer] disclaims any warranty or liability for your use of this information.      Learning About Rh Immunoglobulin Shots  Introduction     An Rh immunoglobulin shot is given to pregnant women who have Rh-negative blood.  You may have Rh-negative blood, and your baby may have Rh-positive blood. If the two types of blood mix, your body will make antibodies. This is called Rh " sensitization. Most of the time, this is not a problem the first time you're pregnant. But it could cause problems in future pregnancies.  This shot keeps your body from making the antibodies. You get the shot around 28 weeks of pregnancy. After the birth, your baby's blood is tested. If the blood is Rh positive, you will get another shot. You may also get the shot if you have vaginal bleeding while you are pregnant or if you have a miscarriage. These shots protect future pregnancies.  Women with Rh negative blood will need this shot each time they get pregnant.  Example  Rh immunoglobulin (HypRho-D, MICRhoGAM, and RhoGAM)  Possible side effects  Rare side effects may include:  Some mild pain where you got the shot.  A slight fever.  An allergic reaction.  You may have other side effects not listed here. Check the information that comes with your medicine.  What to know about taking this medicine  You may need more than one shot. You may need the shot again:  After amniocentesis, fetal blood sampling, or chorionic villus sampling tests.  If you have bleeding in your second or third trimester.  After turning of a breech baby.  After an injury to the belly while you are pregnant.  After a miscarriage or an .  Before or right after treatment for an ectopic or a partial molar pregnancy.  Tell your doctor if you have any allergies or have had a bad response to medicines in the past.  If you get this shot within 3 months of getting a live-virus vaccine, the vaccine may not work. Your doctor will tell you if you need more vaccine.  Check with your doctor or pharmacist before you use any other medicines. This includes over-the-counter medicines. Make sure your doctor knows all of the medicines, vitamins, herbs, and supplements you take. Taking some medicines at the same time can cause problems.  Where can you learn more?  Go to https://www.healthwise.net/patiented  Enter V615 in the search box to learn more about  "\"Learning About Rh Immunoglobulin Shots.\"  Current as of: July 11, 2023               Content Version: 13.8    9940-9253 [employer].   Care instructions adapted under license by your healthcare professional. If you have questions about a medical condition or this instruction, always ask your healthcare professional. [employer] disclaims any warranty or liability for your use of this information.      Rubella (Czech Measles): Care Instructions  Overview  Rubella, also called Czech measles or 3-day measles, is a disease caused by a virus. It spreads by coughs, sneezes, and close contact. Rubella usually is mild and does not cause long-term problems. But if you are pregnant and get it, you can give the disease to your unborn baby. This can cause serious birth defects.  While you have rubella, you may get a rash and a mild fever, and the lymph glands in your neck may swell. Older children often have a fever, eye pain, a sore throat, and body aches. You can relieve most symptoms with care at home. Avoid being around others, especially pregnant people, until your rash has been gone for at least 4 days. People who have not had this disease before or have not had the vaccine have the greatest chance of getting the virus.  Follow-up care is a key part of your treatment and safety. Be sure to make and go to all appointments, and call your doctor if you are having problems. It's also a good idea to know your test results and keep a list of the medicines you take.  How can you care for yourself at home?  Drink plenty of fluids. If you have kidney, heart, or liver disease and have to limit fluids, talk with your doctor before you increase the amount of fluids you drink.  Get plenty of rest to help your body heal.  Take an over-the-counter pain medicine, such as acetaminophen (Tylenol), ibuprofen (Advil, Motrin), or naproxen (Aleve), to reduce fever and discomfort. Read and follow all instructions on " "the label. Do not give aspirin to anyone younger than 20. It has been linked to Reye syndrome, a serious illness.  Do not take two or more pain medicines at the same time unless the doctor told you to. Many pain medicines have acetaminophen, which is Tylenol. Too much acetaminophen (Tylenol) can be harmful.  Try not to scratch the rash. Put cold, wet cloths on the rash to reduce itching.  Do not smoke. Smoking can make your symptoms worse. If you need help quitting, talk to your doctor about stop-smoking programs and medicines. These can increase your chances of quitting for good.  Avoid contact with people who have never had rubella and who have not been immunized.  When should you call for help?   Call your doctor now or seek immediate medical care if:    You have a fever with a stiff neck or a severe headache.     You are sensitive to light or feel very sleepy or confused.   Watch closely for changes in your health, and be sure to contact your doctor if:    You do not get better as expected.   Where can you learn more?  Go to https://www.[employer].net/patiented  Enter B812 in the search box to learn more about \"Rubella (Emirati Measles): Care Instructions.\"  Current as of: June 13, 2023               Content Version: 13.8    7336-4255 [employer].   Care instructions adapted under license by your healthcare professional. If you have questions about a medical condition or this instruction, always ask your healthcare professional. [employer] disclaims any warranty or liability for your use of this information.      Gonorrhea and Chlamydia: About These Tests  What is it?  These tests use a sample of urine or other body fluid to look for the bacteria that cause these sexually transmitted infections (STIs). The fluid sample can come from the cervix, vagina, rectum, throat, or eyes.  Why is this test done?  These tests may be done to:  Find out if symptoms are caused by gonorrhea or " "chlamydia.  Check people who are at high risk of being infected with gonorrhea or chlamydia.  Retest people several months after they have been treated for gonorrhea or chlamydia.  Check for infection in your  if you had a gonorrhea or chlamydia infection at the time of delivery.  How can you prepare for the test?  If you are going to have a urine test, do not urinate for at least 1 hour before the test.  If you think you may have chlamydia or gonorrhea, don't have sexual intercourse until you get your test results. And you may want to have tests for other STIs, such as HIV.  How is the test done?  For a direct sample, a swab is used to collect body fluid from the cervix, vagina, rectum, throat, or eyes. Your doctor may collect the sample. Or you may be given instructions on how to collect your own sample.  For a urine sample, you will collect the urine that comes out when you first start to urinate. Don't wipe the genital area clean before you urinate.  How long does the test take?  The test will take a few minutes.  What happens after the test?  You will be able to go home right away.  You can go back to your usual activities right away.  If you do have an infection, don't have sexual intercourse for 7 days after you start treatment. And your sex partner(s) should also be treated.  Follow-up care is a key part of your treatment and safety. Be sure to make and go to all appointments, and call your doctor if you are having problems. It's also a good idea to keep a list of the medicines you take. Ask your doctor when you can expect to have your test results.  Where can you learn more?  Go to https://[email or web address]  Enter K976 in the search box to learn more about \"Gonorrhea and Chlamydia: About These Tests.\"  Current as of: 2023               Content Version: 13.8    5459-3115 [employer].   Care instructions adapted under license by your healthcare professional. If you " have questions about a medical condition or this instruction, always ask your healthcare professional. Healthwise, [facility] disclaims any warranty or liability for your use of this information.      Trichomoniasis: About This Test  What is it?     This test uses a sample of urine or other body fluid to look for the tiny parasite that causes trichomoniasis (also called trich). The fluid sample can come from the vagina, cervix, or urethra. Your doctor may choose to use one or more of many available tests.  Why is it done?  A trich test may be done to:  Find out if symptoms are caused by trich.  Check people who are at high risk for being infected with trich.  Check after treatment to make sure that the infection is gone.  How do you prepare for the test?  If you are going to have a urine test, do not urinate for at least 1 hour before the test.  How is the test done?  For a direct sample, a swab is used to collect body fluid from the cervix, vagina, or urethra. Your doctor may collect the sample. Or you may be given instructions on how to collect your own sample.  For a urine sample, you will collect the urine that comes out when you first start to urinate. Don't wipe the area clean before you urinate.  How long does the test take?  It will take a few minutes to collect a sample.  What happens after the test?  You can go home right away.  You can go back to your usual activities right away.  You may get the test results the same day or several days later. It depends on the test used.  If you do have an infection, don't have sexual intercourse for 7 days after you start treatment. Your sex partner(s) should also be treated.  Follow-up care is a key part of your treatment and safety. Be sure to make and go to all appointments, and call your doctor if you are having problems. Ask your doctor when you can expect to have your test results.  Current as of: April 19, 2023               Content Version: 13.8    0846-3948  "[employer].   Care instructions adapted under license by your healthcare professional. If you have questions about a medical condition or this instruction, always ask your healthcare professional. [employer] disclaims any warranty or liability for your use of this information.      HIV Testing: Care Instructions  Overview  You can get tested for the human immunodeficiency virus (HIV). Most doctors use a blood test to check for HIV antibodies and antigens in your blood. It may also check for the genetic material (RNA) of HIV. Some tests use saliva to check for HIV antibodies. But these aren't as accurate. For example, they may give a false result if you've just been infected.  What do the results mean?    Normal (negative)    No HIV antibodies, antigens, or RNA were found.  You may need more testing. It can make sure your test results are correct.    Uncertain (indeterminate)    Test results didn't clearly show if you have an HIV infection.  HIV antibodies or antigens may not have formed yet.  Some other type of antibody or antigen may have affected the results.  You will need another test to be sure.    Abnormal (positive)    HIV antibodies, antigens, or RNA were found.  If you haven't had an RNA test yet, one will be done. If it's positive, you have HIV.  If your test result is positive, your doctor will talk to you. You will discuss starting treatment.  Follow-up care is a key part of your treatment and safety. Be sure to make and go to all appointments, and call your doctor if you are having problems. It's also a good idea to know your test results and keep a list of the medicines you take.  Where can you learn more?  Go to https://www.[employer].net/patiented  Enter T792 in the search box to learn more about \"HIV Testing: Care Instructions.\"  Current as of: June 13, 2023               Content Version: 13.8 2006-2023 [employer].   Care instructions adapted under " license by your healthcare professional. If you have questions about a medical condition or this instruction, always ask your healthcare professional. Health[employer], [employer] disclaims any warranty or liability for your use of this information.      Hepatitis C Virus Tests: About These Tests  What are they?     Hepatitis C virus tests are blood tests that check for substances in the blood that show whether you have hepatitis C now or had it in the past. The tests can also tell you what type of hepatitis C you have and how severe the disease is. This can help your doctor with treatment.  If the tests show that you have long-term hepatitis C, you need to take steps to prevent spreading the disease.  Why are these tests done?  You may need these tests if:  You have symptoms of hepatitis.  You may have been exposed to the virus. You are more likely to have been exposed to the virus if you inject drugs or are exposed to body fluids (such as if you are a health care worker).  You've had other tests that show you have liver problems.  You are 18 to 79 years old.  You have an HIV infection.  The tests also are done to help your doctor decide about your treatment and see how well it works.  How do you prepare for the test?  In general, there's nothing you have to do before this test, unless your doctor tells you to.  How is the test done?  A health professional uses a needle to take a blood sample, usually from the arm.  What happens after these tests?  You will probably be able to go home right away.  You can go back to your usual activities right away.  Follow-up care is a key part of your treatment and safety. Be sure to make and go to all appointments, and call your doctor if you are having problems. It's also a good idea to keep a list of the medicines you take. Ask your doctor when you can expect to have your test results.  Where can you learn more?  Go to https://www.[employer].net/patiented  Enter W551 in the search  "box to learn more about \"Hepatitis C Virus Tests: About These Tests.\"  Current as of: June 13, 2023               Content Version: 13.8    7412-6604 [employer].   Care instructions adapted under license by your healthcare professional. If you have questions about a medical condition or this instruction, always ask your healthcare professional. [employer] disclaims any warranty or liability for your use of this information.      Learning About Fetal Ultrasound Results  What is a fetal ultrasound?     Fetal ultrasound is a test that lets your doctor see an image of your baby. Your doctor learns information about your baby from this picture. You may find out, for example, if you are having a boy or a girl. But the main reason you have this test is to get information about your baby's health.  (You may hear your baby called a fetus. This is a common medical term for a baby that's growing in the mother's uterus.)  What kind of information can you learn from this test?  The findings of an ultrasound fall into two categories, normal and abnormal.  Normal  The fetus is the right size for its age.  The placenta is the expected size and does not cover the cervix.  There is enough amniotic fluid in the uterus.  No birth defects can be seen.  Abnormal  The fetus is small or large for its age.  The placenta covers the cervix.  There is too much or too little amniotic fluid in the uterus.  The fetus may have a birth defect.  What does an abnormal result mean?  Abnormal seems to imply that something is wrong with your baby. But what it means is that the test has shown something the doctor wants to take a closer look at.  And that's what happens next. Your doctor will talk to you about what further test or tests you may need.  What do the results mean?  Some of the things your doctor may see on an abnormal ultrasound include:  Echogenic bowel.  The bowel looks very bright on the screen. This could mean " that there's blood in the bowel. Or it could mean that something is blocking the small bowel.  Increased nuchal translucency.  The ultrasound measures the thickness at the back of the baby's neck. An increase in thickness is sometimes an early sign of Down syndrome.  Increased or decreased amniotic fluid.  The doctor will look for a reason for the level of amniotic fluid and will watch the pregnancy closely as it progresses.  Large ventricles.  Ventricles in the brain look larger than they should. Your doctor may take a closer look at the brain.  Renal pyelectasis/hydronephrosis.  The ultrasound measures the fluid around the kidney. If there is more fluid than expected, there is a chance of urinary tract or kidney problems.  Short long bones.  The ultrasound measures certain arm and leg bones. A long bone (humerus or femur) that is shorter than average could be a sign of Down syndrome.  Subchorionic hemorrhage.  An ultrasound can show bleeding under one of the membranes that surrounds the fetus. Some women don't have symptoms of bleeding. The ultrasound can find this problem when women are not bleeding from their vagina. Women who have this condition have a slightly higher chance of miscarriage.  What do you do now?  Take a deep breath, and let it out. Keep in mind that an abnormal finding on an ultrasound, after it's coupled with more information, may:  Turn out to be nothing.  Turn out to be something mild that won't affect the baby.  Turn out to be something more serious. But if this happens, early diagnosis helps you and your doctor plan treatment options sooner rather than later.  Your medical team is there for you. So are your family and friends. Ask questions, and get the help and support you need.  Follow-up care is a key part of your treatment and safety. Be sure to make and go to all appointments, and call your doctor if you are having problems. It's also a good idea to know your test results and keep a  "list of the medicines you take.  Where can you learn more?  Go to https://[email or web address].net/patiented  Enter K451 in the search box to learn more about \"Learning About Fetal Ultrasound Results.\"  Current as of: July 11, 2023               Content Version: 13.8    4796-1788 [employer].   Care instructions adapted under license by your healthcare professional. If you have questions about a medical condition or this instruction, always ask your healthcare professional. [employer] disclaims any warranty or liability for your use of this information.      Learning About Prenatal Visits  Overview     Regular prenatal visits are very important during any pregnancy. These quick office visits may seem simple and routine. But they can help you have a safe and healthy pregnancy. Your doctor is watching for problems that can only be found through regular checkups. The visits also give you and your doctor time to build a good relationship.  It's common to see your doctor every 4 weeks until week 28 of pregnancy. Then the visits will happen more often. From weeks 28 to 36, it's common to have visits every 2 to 3 weeks. In the final month of pregnancy, you likely will see your doctor every week. Your doctor may want to see you more or less often, depending on your health, your age, and if you've had a normal, full-term pregnancy before.  At different times in your pregnancy, you will have exams and tests. Some are routine. Others are done only when there is a chance of a problem. Everything healthy you do for your body helps you have a healthy pregnancy. Rest when you need it. Eat well, drink plenty of water, and exercise regularly.  What happens during a prenatal visit?  You will have blood pressure checks, along with urine tests. You also may have blood tests. If you need to go to the bathroom while waiting for the doctor, tell the nurse. You will be given a sample cup so your urine can be " tested.  You will be weighed and have your belly measured.  Your doctor may listen to the fetal heartbeat with a special device.  At about 24 weeks, and possibly earlier in your pregnancy, your doctor will check your blood sugar (glucose tolerance test) for diabetes that can occur during pregnancy. This is gestational diabetes, which can be harmful.  You will have tests to check for infections that could harm your . These include group B streptococcus and hepatitis B.  Your doctor may do ultrasounds to check for problems. This also checks the position of the fetus. An ultrasound uses sound waves to produce a picture of the fetus.  You may get your vaccines updated.  Your doctor may ask you questions to check for signs of anxiety or depression. Tell your doctor if you feel sad, anxious, or hopeless for more than a few days.  You may have other tests at any time during your pregnancy.  Use your visits to discuss with your doctor any concerns you have.  How can you care for yourself at home?  Get plenty of rest.  Try to exercise every day, if your doctor says it is okay. If you have not exercised in the past, start out slowly. For example, you can take short walks each day.  Choose healthy foods, such as fruits, vegetables, whole grains, lean proteins, low-fat dairy, and healthy fats.  Drink plenty of fluids. Cut down on drinks with caffeine, such as coffee, tea, and cola. If you have kidney, heart, or liver disease and have to limit fluids, talk with your doctor before you increase the amount of fluids you drink.  Try to avoid chemical fumes, paint fumes, and poisons.  If you smoke, vape, or use alcohol, marijuana, or other drugs, quit or cut back as much as you can. Talk to your doctor if you need help quitting.  Review all of your medicines, including over-the-counter medicines and supplements, with your doctor. Some of your routine medicines may need to be changed. Do not stop or start taking any medicines  "without talking to your doctor first.  Follow-up care is a key part of your treatment and safety. Be sure to make and go to all appointments, and call your doctor if you are having problems. It's also a good idea to know your test results and keep a list of the medicines you take.  Where can you learn more?  Go to https://www.[email or web address].net/patiented  Enter J502 in the search box to learn more about \"Learning About Prenatal Visits.\"  Current as of: July 11, 2023               Content Version: 13.8    9160-6710 [employer].   Care instructions adapted under license by your healthcare professional. If you have questions about a medical condition or this instruction, always ask your healthcare professional. [employer] disclaims any warranty or liability for your use of this information.      Intimate Partner Violence: Care Instructions  Overview     If you want to save this information but don't think it is safe to take it home, see if a trusted friend can keep it for you. Plan ahead. Know who you can call for help, and memorize the phone number.   Be careful online too. Your online activity may be seen by others. Do not use your personal computer or device to read about this topic. Use a safe computer, such as one at work, a friend's home, or a library.    Intimate partner violence--a type of domestic abuse--is different from an argument now and then. It is a pattern of abuse that one person may use to control another person's behavior. It may start with threats and name-calling. Then, it may lead to more serious acts, like pushing and slapping. The abuse also may occur in other areas. For example, the abuser may withhold money or spend a partner's money without their knowledge.  Abuse can cause serious harm. You are more likely to have a long-term health problem from the injuries and stress of living in a violent relationship. People who are sexually abused by their partners have more " sexually transmitted infections and unplanned pregnancies. Anyone who is abused also faces emotional pain. Anyone can be abused in relationships. In some relationships, both people use abusive behavior.  If you are pregnant, abuse can cause problems such as poor weight gain, infections, and bleeding. Abuse during this time may increase your baby's risk of low birth weight, premature birth, and death.  Follow-up care is a key part of your treatment and safety. Be sure to make and go to all appointments, and call your doctor if you are having problems. It's also a good idea to know your test results and keep a list of the medicines you take.  How can you care for yourself at home?  If you do not have a safe place to stay, discuss this with your doctor before you leave.  Have a plan for where to go, how to leave your home, and where to stay in case of an emergency. Do not tell your partner about your plan. Contact:  The National Domestic Violence Hotline toll-free at 1-461.435.3534. They can help you find resources in your area.  Your local police department, hospital, or clinic for information about shelters and safe homes near you.  Talk to a trusted friend or neighbor, a counselor, or a cristina leader. Do not feel that you have to hide what happened.  Teach your children how to call for help in an emergency.  Be alert to warning signs, such as threats, heavy alcohol use, or drug use. This can help you avoid danger.  If you can, make sure that there are no guns or other weapons in your home.  When should you call for help?   Call 911 anytime you think you may need emergency care. For example, call if:    You or someone else has just been abused.     You think you or someone else is in danger of being abused.   Watch closely for changes in your health, and be sure to contact your doctor if you have any problems.  Where can you learn more?  Go to https://www.healthwise.net/patiented  Enter G282 in the search box to learn  "more about \"Intimate Partner Violence: Care Instructions.\"  Current as of: June 25, 2023               Content Version: 13.8    1501-6870 [employer].   Care instructions adapted under license by your healthcare professional. If you have questions about a medical condition or this instruction, always ask your healthcare professional. [employer] disclaims any warranty or liability for your use of this information.      Intimate Partner Violence Safety Instructions: Care Instructions  Overview     If you want to save this information but don't think it is safe to take it home, see if a trusted friend can keep it for you. Plan ahead. Know who you can call for help, and memorize the phone number.   Be careful online too. Your online activity may be seen by others. Do not use your personal computer or device to read about this topic. Use a safe computer, such as one at work, a friend's home, or a library.    When you are abused by a spouse or partner, you can take actions to protect yourself and your children.  You can increase your safety whether you decide to stay with your spouse or partner or you decide to leave. You may want to make a safety plan and pack a bag ahead of time. This will help you leave quickly when you decide to. Remember, you cannot change your partner's actions, but you can find help for you and your children. No one deserves to be abused.  Follow-up care is a key part of your treatment and safety. Be sure to make and go to all appointments, and call your doctor if you are having problems. It's also a good idea to know your test results and keep a list of the medicines you take.  How can you care for yourself at home?  Make a plan for your safety   If you decide to stay with your abusive spouse or partner, you can do the following to increase your safety:  Decide what works best to keep you safe in an emergency.  Know who you can call to help you in an emergency.  Decide " if you will call the police if you get hurt again. If you can, agree on a signal with your children or neighbor to call the police for you if you need help. You can flash lights or hang something out of a window.  Choose a safe place to go for a short time if you need to leave home. Memorize the address and phone number.  Learn escape routes out of your home in case you need to leave in a hurry. Teach your children different ways to get out of your home quickly if they need to.  If you can, hide or lock up things that can be used as weapons (guns, knives, hammers).  Learn the number of a domestic violence shelter. Talk to the people there about how they can help.  Find out about other community resources that can help you.  Take pictures of bruises or other injuries if you can. You can also take pictures of things your abuser has broken.  Teach your children that violence is never okay. Tell them that they do not deserve to be hurt.  Pack a bag   Prepare a bag with things you will need if you leave suddenly. Leave it with a friend, a relative, or someone else you trust. You could include the following items in the bag:  A set of keys to your home and car.  Emergency phone numbers and addresses.  Money such as cash or checks. You can also ask a friend, a relative, or someone else you trust to hold money for you.  Copies of legal documents such as house and car titles or rent receipts, birth certificates, Social Security card, voter registration, marriage and 's licenses, and your children's health records.  Personal items you would need for a few days, such as clothes, a toothbrush, toothpaste, and any medicines you or your children need.  A favorite toy or book for your child or children.  Diapers and bottles, if you have very young children.  Pictures that show signs of abuse and violence. You may also add pictures of your abuser.  If you leave   If you decide to leave, you can take the following steps:  Go  "to the emergency room at a hospital if you have been hurt.  Think about asking the police to be with you as you leave. They can protect you as you leave your home.  If you decide to leave secretly, remember that activities can be tracked. Your abuser may still have access to your cell phone, email, and credit cards. It may be possible for these to be traced. Always be aware of your surroundings.  If this is an emergency, do not worry about gathering up anything. Just leave--your safety is most important.  If your abuser moves out, change the locks on the doors. If you have a security system, change the access code.  When should you call for help?   Call 911 anytime you think you may need emergency care. For example, call if:    You or someone else has just been abused.     You think you or someone else is in danger of being abused.   Watch closely for changes in your health, and be sure to contact your doctor if you have any problems.  Where can you learn more?  Go to https://www.[employer].net/patiented  Enter A752 in the search box to learn more about \"Intimate Partner Violence Safety Instructions: Care Instructions.\"  Current as of: June 25, 2023               Content Version: 13.8    9338-5187 [employer].   Care instructions adapted under license by your healthcare professional. If you have questions about a medical condition or this instruction, always ask your healthcare professional. [employer] disclaims any warranty or liability for your use of this information.      Learning About Intimate Partner Violence  What is intimate partner violence?  Intimate partner violence is a type of domestic abuse. It's threatening, emotionally harmful, or violent behavior in a personal relationship. It can happen between past or current partners or spouses. In some relationships both people abuse each other. One partner may be more abusive. Or the abuse may be equal.  Abuse can affect people of " any ethnic group, race, or Gnosticist. It can affect teens, adults, or the elderly. And it can happen to people of any sexual orientation, gender, or social status.  Abusers use fear, bullying, and threats to control their partners. They may control what their partners do. They may control where their partners go or who they see. They may act jealous, controlling, or possessive. These early signs of abuse may happen soon after the start of the relationship. Sometimes it can be hard to notice abuse at first. But after the relationship becomes more serious, the abuse may get worse.  If you are being abused in your relationship, it's important to get help. The abuse is not your fault. You don't have to face it alone.  Be careful  It may not be safe to take home domestic abuse information like this handout. Some people ask a trusted friend to keep it for them. It's also important to plan ahead and to memorize the phone number of places you can go for help. If you are concerned about your safety, do not use your computer, smartphone, or tablet to read about domestic abuse.   What are the types of intimate partner violence?  Abuse can happen in different ways. Each type can happen on its own or in combination with others.  Emotional abuse  Emotional abuse is a pattern of threats, insults, or controlling behavior. It includes verbal abuse. It goes beyond healthy disagreements in a relationship. It's a sign of an unhealthy relationship.  Do you feel threatened, intimidated, or controlled?  Does your partner:  Threaten your children, other family members, or pets?  Use jokes meant to embarrass or shame you?  Call you names?  Tell you that you are a bad parent?  Threaten to take away your children?  Threaten to have you or your family members deported?  Control your access to money or other basic needs?  Control what you do, who you see or talk to, or where you go?  Another form of emotional abuse is denying that it is  happening. Or the abuser may act like the abuse is no big deal or is your fault.  Sexual abuse  With sexual abuse, abusers may try to convince or force you to have sex. They may force you into sex acts you're not comfortable with. Or they may sexually assault you. Sexual abuse can happen even if you are in a committed relationship.  Physical abuse  Physical abuse means that a partner hits, kicks, or does something else to physically hurt you. Physical abuse that starts with a slap might lead to kicking, shoving, and choking over time. The abuser may also threaten to hurt or kill you.  Stalking  Stalking means that an abuser gives you attention that you do not want and that causes you fear. Examples of stalking include:  Following you.  Showing up at places where the abuser isn't invited, such as at your work or school.  Constantly calling or texting you.  What problems can  to?  Intimate partner violence can be very dangerous. It can cause serious, repeated injury. It can even lead to death.  All forms of abuse can cause long-term health problems from the stress of a violent relationship. Verbal abuse can lead to sexual and physical abuse.  Abuse causes:  Emotional pain.  Depression.  Anxiety.  Post-traumatic stress.  Sexual abuse can lead to sexually transmitted infections (such as HIV/AIDS) and unplanned pregnancy.  Pregnancy can be a very dangerous time for people in abusive relationships. Abuse can cause or increase the risk of problems during pregnancy. These include low weight gain, anemia, infections, and bleeding. Abuse may also increase your baby's risk of low birth weight, premature birth, and death.  It can be hard for some victims of abuse to ask for help or to leave their relationship. You may feel scared, stuck, or not sure what steps to take. But it's important not to ignore abuse. Talking to someone you trust could be the first step to ending the abuse and taking care of your own health and  "happiness again. There are resources available that can help keep you safe.  Where can you get help?  Talk to a trusted friend. Find a local advocacy group, or talk to your doctor about the abuse.  Contact the National Domestic Violence Hotline at 6-829-908-CIQL (1-971.697.5446) for more safety tips. They can guide you to groups in your area that can help. Or go to the National Coalition Against Domestic Violence website at www.[employer].org to learn more.  Domestic violence groups or a counselor in your area can help you make a safety plan for yourself and your children.  When to call for help  Call 911 anytime you think you may need emergency care. For example, call if:  You think that you or someone you know is in danger of being abused.  You have been hurt and can't have someone safely take you to emergency care.  You have just been abused.  A family member has just been abused.  Where can you learn more?  Go to https://www.[employer].net/patiented  Enter S665 in the search box to learn more about \"Learning About Intimate Partner Violence.\"  Current as of: June 25, 2023               Content Version: 13.8    9648-9125 [employer].   Care instructions adapted under license by your healthcare professional. If you have questions about a medical condition or this instruction, always ask your healthcare professional. [employer] disclaims any warranty or liability for your use of this information.      Vaginal Bleeding During Pregnancy: Care Instructions  Overview     It's common to have some vaginal spotting when you are pregnant. In some cases, the bleeding isn't serious. And there aren't any more problems with the pregnancy.  But sometimes bleeding is a sign of a more serious problem. This is more common if the bleeding is heavy or painful. Examples of more serious problems include miscarriage, an ectopic pregnancy, and a problem with the placenta.  You may have to see your doctor again " to be sure everything is okay. You may also need more tests to find the cause of the bleeding.  Home treatment may be all you need. But it depends on what is causing the bleeding. Be sure to tell your doctor if you have any new symptoms or if your symptoms get worse.  The doctor has checked you carefully, but problems can develop later. If you notice any problems or new symptoms, get medical treatment right away.  Follow-up care is a key part of your treatment and safety. Be sure to make and go to all appointments, and call your doctor if you are having problems. It's also a good idea to know your test results and keep a list of the medicines you take.  How can you care for yourself at home?  If your doctor prescribed medicines, take them exactly as directed. Call your doctor if you think you are having a problem with your medicine.  Do not have vaginal sex until your doctor says it's okay.  Do not put anything in your vagina until your doctor says it's okay.  Ask your doctor about other activities you can or can't do.  Get a lot of rest. Being pregnant can make you tired.  Do not use nonsteroidal anti-inflammatory drugs (NSAIDs), such as ibuprofen (Advil, Motrin), naproxen (Aleve), or aspirin, unless your doctor says it is okay.  When should you call for help?   Call 911 anytime you think you may need emergency care. For example, call if:    You passed out (lost consciousness).     You have severe vaginal bleeding. This means you are soaking through a pad each hour for 2 or more hours.     You have sudden, severe pain in your belly or pelvis.   Call your doctor now or seek immediate medical care if:    You have new or worse vaginal bleeding.     You are dizzy or lightheaded, or you feel like you may faint.     You have pain in your belly, pelvis, or lower back.     You think that you are in labor.     You have a sudden release of fluid from your vagina.     You've been having regular contractions for an hour. This  "means that you've had at least 8 contractions within 1 hour or at least 4 contractions within 20 minutes, even after you change your position and drink fluids.     You notice that your baby has stopped moving or is moving much less than normal.   Watch closely for changes in your health, and be sure to contact your doctor if you have any problems.  Where can you learn more?  Go to https://www.[employer].Trupanion/patiented  Enter N829 in the search box to learn more about \"Vaginal Bleeding During Pregnancy: Care Instructions.\"  Current as of: July 11, 2023               Content Version: 13.8    3094-1731 [employer].   Care instructions adapted under license by your healthcare professional. If you have questions about a medical condition or this instruction, always ask your healthcare professional. [employer] disclaims any warranty or liability for your use of this information.      Weeks 10 to 14 of Your Pregnancy: Care Instructions  It's now possible to hear the fetus's heartbeat with a special ultrasound device. And the fetus's organs are developing.    Decide about tests to check for birth defects. Think about your age, your chance of passing on a family disease, your need to know about any problems, and what you might do after you have the test results.   It's okay to exercise. Try activities such as walking or swimming. Check with your doctor before starting a new program.     You may feel more tired than usual.  Taking naps during the day may help.     You may feel emotional.  It might help to talk to someone.     You may have headaches.  Try lying down and putting a cool cloth over your forehead.     You can use acetaminophen (Tylenol) for pain relief.  Don't take any anti-inflammatory medicines (such as Advil, Motrin, Aleve), unless your doctor says it's okay.     You may feel a fullness or aching in your lower belly.  This can feel like the kind of cramps you might get before a period. " "A back rub may help.     You may need to urinate more.  Your growing uterus and changing hormones can affect your bladder.     You may feel sick to your stomach (morning sickness).  Try avoiding food and smells that make you feel sick.     Your breasts may feel different.  They may feel tender or get bigger. Your nipples may get darker. Try a bra that gives you good support.     Avoid alcohol, tobacco, and drugs (including marijuana).  If you need help quitting, talk to your doctor.     Take a daily prenatal vitamin.  Choose one with folic acid.   Follow-up care is a key part of your treatment and safety. Be sure to make and go to all appointments, and call your doctor if you are having problems. It's also a good idea to know your test results and keep a list of the medicines you take.  Where can you learn more?  Go to https://www.[employer]ly.net/patiented  Enter E090 in the search box to learn more about \"Weeks 10 to 14 of Your Pregnancy: Care Instructions.\"  Current as of: July 11, 2023               Content Version: 13.8    3630-4650 [employer].   Care instructions adapted under license by your healthcare professional. If you have questions about a medical condition or this instruction, always ask your healthcare professional. [employer] disclaims any warranty or liability for your use of this information.      Understanding Alpha and Beta Thalassemia  What is thalassemia?  Thalassemia [[facility]] is a lifelong blood disorder. It is caused by mutations (changes) in the genes that make hemoglobin.   Hemoglobin is a protein in red blood cells that carries oxygen. Hemoglobin is made of 4 smaller protein chains: 2 blocks of alpha chains and 2 blocks of beta chains (see Figure 1). Each block has heme (iron) in the center. Oxygen sticks to the heme, allowing your body to carry it through the bloodstream. The oxygen is delivered to your whole body.  When you have alpha thalassemia, " your alpha blocks are smaller or are missing one or both alpha chains. (See Figure 2 for an example.) For beta thalassemia, the beta blocks are smaller or fewer in number.   With either disorder, your body makes smaller hemoglobin and possibly fewer red blood cells to hold hemoglobin (anemia). You may feel very tired or have other problems as a result.  How do you get thalassemia?  There are 4 genes for alpha thalassemia and 2 genes for beta thalassemia. For you to have either alpha or beta thalassemia, both of your parents must carry a gene mutation for the disease and pass it down to you.   It's possible to have more severe or less severe symptoms than your parents. If you get a mutation from only one parent, for example, you won't have symptoms of thalassemia (thalassemia trait)--but you could still pass the mutation to your children.  Types of thalassemias  Some types of thalassemia have fewer symptoms than others. How severe the thalassemia is depends on how many mutated genes you have inherited and how many alpha or beta blocks are affected.   Alpha Thalassemia  Silent carrier (1 alpha mutation): People with this type have no symptoms and often do not know they have thalassemia.  Alpha thalassemia trait (2 alpha mutations): Some people with this trait may have mild anemia, but they often feel well.  Hemoglobin H disease (3 alpha mutations): People with this disorder have anemia more often. They sometimes need blood transfusions, but can have a normal life span.  Hemoglobin Barts (4 alpha mutations):  With this type, no alpha blocks are made. Severe problems occur during the mother's pregnancy and newborns rarely survive.  Beta Thalassemia  Beta thalassemia trait (1 beta mutation): People with this trait (also called beta thalassemia minor) have red blood cells that are small. Mild anemia can occur, but it is rare.  Beta thalassemia intermedia (2 beta mutations): In this type, both beta genes are abnormal, but  these mutations may be mild. More severe types sometimes need blood transfusions to treat anemia and medicine to treat iron buildup. Patients have normal life spans.  Beta thalassemia major (2 severe beta mutations): This is the most severe form of beta thalassemia. Both beta genes are abnormal, causing little to no beta blocks to be made. Patients often need medicine to reduce iron buildup, as well as monthly blood transfusions to stay healthy. The only cure at this time is a bone marrow transplant. More options are still being studied.  Treatment and outcomes  Mild types: People with a mild type of alpha or beta thalassemia rarely need treatment. Some need folic acid to help make more red blood cells. Most have normal life spans.  Moderate to severe types: Patients with these types have a higher risk for reduced growth, early bone thinning and pregnancy problems.  Most severe types: These patients often need regular blood transfusions, even if not sick. It is common to have iron build up in your body, so medicine may be needed to reduce the buildup. If left untreated, too much iron, especially in the liver and heart, can lead to premature death.  Outcomes for patients with alpha or beta thalassemia are usually very good in developed countries like the [place]. Survival rates higher than 90% have been reported for children.   For informational purposes only. Not to replace the advice of your health care provider. Copyright   2021 [facility]. All rights reserved. Clinically reviewed by Lyndon Carrillo MD, Hematology. [employer] [id number] - REV 08/21.    Nutrition During Pregnancy: Care Instructions  Overview     Healthy eating when you are pregnant is important for you and your baby. It can help you feel well and have a successful pregnancy and delivery. During pregnancy your nutrition needs increase. Even if you have excellent eating habits, your doctor may recommend a multivitamin to make  sure you get enough iron and folic acid.  You may wonder how much weight you should gain. In general, if you were at a healthy weight before you became pregnant, then you should gain between 25 and 35 pounds. If you were overweight before pregnancy, then you'll likely be advised to gain 15 to 25 pounds. If you were underweight before pregnancy, then you'll probably be advised to gain 28 to 40 pounds. Your doctor will work with you to set a weight goal that is right for you. Gaining a healthy amount of weight helps you have a healthy baby.  Follow-up care is a key part of your treatment and safety. Be sure to make and go to all appointments, and call your doctor if you are having problems. It's also a good idea to know your test results and keep a list of the medicines you take.  How can you care for yourself at home?  Eat plenty of fruits and vegetables. Include a variety of orange, yellow, and leafy dark-green vegetables every day.  Choose whole-grain bread, cereal, and pasta. Good choices include whole wheat bread, whole wheat pasta, brown rice, and oatmeal.  Get 4 or more servings of milk and milk products each day. Good choices include nonfat or low-fat milk, yogurt, and cheese. If you cannot eat milk products, you can get calcium from calcium-fortified products such as orange juice, soy milk, and tofu. Other non-milk sources of calcium include leafy green vegetables, such as broccoli, kale, mustard greens, turnip greens, bok tae, and brussels sprouts.  If you eat meat, pick lower-fat types. Good choices include lean cuts of meat and chicken or turkey without the skin.  Do not eat shark, swordfish, breana mackerel, or tilefish. They have high levels of mercury, which is dangerous to your baby. You can eat up to 12 ounces a week of fish or shellfish that have low mercury levels. Good choices include shrimp, wild salmon, pollock, and catfish. Limit some other types of fish, such as white (albacore) tuna, to 4 oz  "(0.1 kg) a week.  Heat lunch meats (such as turkey, ham, or bologna) to 165 F before you eat them. This reduces your risk of getting sick from a kind of bacteria that can be found in lunch meats.  Do not eat unpasteurized soft cheeses, such as brie, feta, fresh mozzarella, and blue cheese. They have a bacteria that could harm your baby.  Limit caffeine. If you drink coffee or tea, have no more than 1 cup a day. Caffeine is also found in kerrie.  Do not drink any alcohol. No amount of alcohol has been found to be safe during pregnancy.  Do not diet or try to lose weight. For example, do not follow a low-carbohydrate diet. If you are overweight at the start of your pregnancy, your doctor will work with you to manage your weight gain.  Tell your doctor about all vitamins and supplements you take.  When should you call for help?  Watch closely for changes in your health, and be sure to contact your doctor if you have any problems.  Where can you learn more?  Go to https://www.[employer].net/patiented  Enter Y785 in the search box to learn more about \"Nutrition During Pregnancy: Care Instructions.\"  Current as of: February 28, 2023               Content Version: 13.8    4022-2614 [employer].   Care instructions adapted under license by your healthcare professional. If you have questions about a medical condition or this instruction, always ask your healthcare professional. [employer] disclaims any warranty or liability for your use of this information.      Exercise During Pregnancy: Care Instructions  Overview     Exercise is good for you during a healthy pregnancy. It can relieve back pain, swelling, and other discomforts. It also prepares your muscles for childbirth. And exercise can improve your energy level and help you sleep better.  If your doctor advises it, get more exercise. For example, walking is a good choice. Bit by bit, increase the amount you walk every day. Try for at least 30 " minutes on most days of the week. You could also try a prenatal exercise class. But if you do not already exercise, be sure to talk with your doctor before you start a new exercise program. Doctors do not recommend contact sports during pregnancy.  Follow-up care is a key part of your treatment and safety. Be sure to make and go to all appointments, and call your doctor if you are having problems. It's also a good idea to know your test results and keep a list of the medicines you take.  How can you care for yourself at home?  Talk with your doctor about the right kind of exercise for each stage of pregnancy.  Listen to your body to know if your exercise is at a safe level.  Do not become overheated while you exercise. High body temperature can be harmful. Avoid activities that can make your body too hot.  If you feel tired, take it easy. You might walk instead of run.  If you are used to strenuous exercise, ask your doctor how to know when it's time to slow down.  If you exercised before getting pregnant, you should be able to keep up your routine early in your pregnancy. Later in your pregnancy, you may want to switch to more gentle activities.  Drink plenty of fluids before, during, and after exercise.  Avoid contact sports, such as soccer and basketball. Also avoid risky activities. These include scuba diving, horseback riding, and exercising at a high altitude (above 6,000 feet). If you live in a place with a high altitude, talk to your doctor about how you can exercise safely.  Do not get overtired while you exercise. You should be able to talk while you work out.  After your fourth month of pregnancy, avoid exercises that require you to lie flat on your back on a hard surface. These include sit-ups and some yoga poses.  Get plenty of rest. You may be very tired while you are pregnant.  Where can you learn more?  Go to https://www.healthwise.net/patiented  Enter S801 in the search box to learn more about  "\"Exercise During Pregnancy: Care Instructions.\"  Current as of: July 11, 2023               Content Version: 13.8    0663-1502 [employer].   Care instructions adapted under license by your healthcare professional. If you have questions about a medical condition or this instruction, always ask your healthcare professional. [employer] disclaims any warranty or liability for your use of this information.      Learning About Pregnancy and Obesity  How does your weight affect your pregnancy?     The basics of prenatal care are the same for everyone, regardless of size. You'll get what you need to have a healthy baby.  But your size can make a difference in a few things. You and your doctor will have to watch your pregnancy weight. Your weight may affect your labor and delivery.  You may have some extra doctor visits and tests. And you may have some tests earlier in your pregnancy. You'll need to pay close attention to things like blood pressure and the chance of getting gestational diabetes. (This is a type of diabetes that sometimes happens during pregnancy.) And close attention will be given to your developing baby.  Work with your doctor to get the care you need. Go to all your doctor visits, and follow your doctor's advice about what to do and what to avoid during pregnancy.  How much weight gain is healthy?  If you are very overweight (obese), experts recommend that you gain between 11 and 20 pounds. Your doctor will work with you to set a weight goal that's right for you. In some cases, your doctor may recommend that you not gain any weight.  How much extra food do you need to eat?  Although you may joke that you're \"eating for two\" during pregnancy, you don't need to eat twice as much food. How much you can eat depends on:  Your height.  How much you weigh when you get pregnant.  How active you are.  If you're carrying more than one fetus (multiple pregnancy).  In the first trimester, " "you'll probably need the same amount of calories as you did before you were pregnant. In general, in your second trimester, you need to eat about 340 extra calories a day. In your third trimester, you need to eat about 450 extra calories a day.  What can you do to have a healthy pregnancy?  The best things you can do for you and your baby are to eat healthy foods, get regular exercise, avoid alcohol and smoking, and go to your doctor visits.  Eat a variety of foods from all the food groups. Make sure to get enough calcium and folic acid.  You may want to work with a dietitian to help you plan healthy meals to get the right amount of calories for you.  If you didn't exercise much before you got pregnant, talk to your doctor about how you can slowly get more active. Your doctor may want to set up an exercise program with you.  Where can you learn more?  Go to https://www.[employer].net/patiented  Enter B644 in the search box to learn more about \"Learning About Pregnancy and Obesity.\"  Current as of: July 11, 2023               Content Version: 13.8    6243-7826 [employer].   Care instructions adapted under license by your healthcare professional. If you have questions about a medical condition or this instruction, always ask your healthcare professional. [employer] disclaims any warranty or liability for your use of this information.      You have been provided the CDC Warning Signs in Pregnancy document.    Additional copies can be found here: www.[employer].com/666567.pdf  "

## 2023-12-12 ENCOUNTER — APPOINTMENT (OUTPATIENT)
Dept: ADMINISTRATIVE | Facility: CLINIC | Age: 23
End: 2023-12-12
Payer: MEDICAID

## 2023-12-13 LAB
ABO/RH(D): NORMAL
ANTIBODY SCREEN: NEGATIVE
SPECIMEN EXPIRATION DATE: NORMAL

## 2023-12-14 ENCOUNTER — ANCILLARY PROCEDURE (OUTPATIENT)
Dept: ULTRASOUND IMAGING | Facility: CLINIC | Age: 23
End: 2023-12-14
Attending: ADVANCED PRACTICE MIDWIFE

## 2023-12-14 ENCOUNTER — LAB (OUTPATIENT)
Dept: LAB | Facility: CLINIC | Age: 23
End: 2023-12-14
Attending: ADVANCED PRACTICE MIDWIFE

## 2023-12-14 ENCOUNTER — PRENATAL OFFICE VISIT (OUTPATIENT)
Dept: OBGYN | Facility: CLINIC | Age: 23
End: 2023-12-14
Attending: ADVANCED PRACTICE MIDWIFE

## 2023-12-14 VITALS
DIASTOLIC BLOOD PRESSURE: 74 MMHG | WEIGHT: 141 LBS | HEART RATE: 75 BPM | BODY MASS INDEX: 26.62 KG/M2 | HEIGHT: 61 IN | SYSTOLIC BLOOD PRESSURE: 116 MMHG

## 2023-12-14 DIAGNOSIS — Z98.891 HX OF CESAREAN SECTION: ICD-10-CM

## 2023-12-14 DIAGNOSIS — O09.90 HIGH-RISK PREGNANCY, UNSPECIFIED TRIMESTER: ICD-10-CM

## 2023-12-14 DIAGNOSIS — Z32.01 PREGNANCY TEST POSITIVE: ICD-10-CM

## 2023-12-14 DIAGNOSIS — N89.8 VAGINAL ITCHING: ICD-10-CM

## 2023-12-14 DIAGNOSIS — B37.31 YEAST INFECTION OF THE VAGINA: ICD-10-CM

## 2023-12-14 DIAGNOSIS — O09.90 HIGH-RISK PREGNANCY, UNSPECIFIED TRIMESTER: Primary | ICD-10-CM

## 2023-12-14 LAB
BACTERIAL VAGINOSIS VAG-IMP: NEGATIVE
CANDIDA DNA VAG QL NAA+PROBE: DETECTED
CANDIDA GLABRATA / CANDIDA KRUSEI DNA: NOT DETECTED
ERYTHROCYTE [DISTWIDTH] IN BLOOD BY AUTOMATED COUNT: 12.1 % (ref 10–15)
HCT VFR BLD AUTO: 36.7 % (ref 35–47)
HGB BLD-MCNC: 12.1 G/DL (ref 11.7–15.7)
MCH RBC QN AUTO: 30.9 PG (ref 26.5–33)
MCHC RBC AUTO-ENTMCNC: 33 G/DL (ref 31.5–36.5)
MCV RBC AUTO: 94 FL (ref 78–100)
PLATELET # BLD AUTO: 274 10E3/UL (ref 150–450)
RBC # BLD AUTO: 3.92 10E6/UL (ref 3.8–5.2)
T VAGINALIS DNA VAG QL NAA+PROBE: NOT DETECTED
WBC # BLD AUTO: 9.9 10E3/UL (ref 4–11)

## 2023-12-14 PROCEDURE — 87340 HEPATITIS B SURFACE AG IA: CPT

## 2023-12-14 PROCEDURE — 87086 URINE CULTURE/COLONY COUNT: CPT | Performed by: ADVANCED PRACTICE MIDWIFE

## 2023-12-14 PROCEDURE — 86780 TREPONEMA PALLIDUM: CPT

## 2023-12-14 PROCEDURE — 86803 HEPATITIS C AB TEST: CPT

## 2023-12-14 PROCEDURE — 87591 N.GONORRHOEAE DNA AMP PROB: CPT | Performed by: ADVANCED PRACTICE MIDWIFE

## 2023-12-14 PROCEDURE — 86850 RBC ANTIBODY SCREEN: CPT

## 2023-12-14 PROCEDURE — 86762 RUBELLA ANTIBODY: CPT

## 2023-12-14 PROCEDURE — 76817 TRANSVAGINAL US OBSTETRIC: CPT

## 2023-12-14 PROCEDURE — 99207 PR PRENATAL VISIT: CPT | Performed by: ADVANCED PRACTICE MIDWIFE

## 2023-12-14 PROCEDURE — 86706 HEP B SURFACE ANTIBODY: CPT

## 2023-12-14 PROCEDURE — 87491 CHLMYD TRACH DNA AMP PROBE: CPT | Performed by: ADVANCED PRACTICE MIDWIFE

## 2023-12-14 PROCEDURE — 99213 OFFICE O/P EST LOW 20 MIN: CPT | Performed by: ADVANCED PRACTICE MIDWIFE

## 2023-12-14 PROCEDURE — 76817 TRANSVAGINAL US OBSTETRIC: CPT | Mod: 26 | Performed by: OBSTETRICS & GYNECOLOGY

## 2023-12-14 PROCEDURE — 0352U MULTIPLEX VAGINAL PANEL BY PCR: CPT | Performed by: ADVANCED PRACTICE MIDWIFE

## 2023-12-14 PROCEDURE — 85027 COMPLETE CBC AUTOMATED: CPT

## 2023-12-14 PROCEDURE — 82306 VITAMIN D 25 HYDROXY: CPT

## 2023-12-14 PROCEDURE — 87389 HIV-1 AG W/HIV-1&-2 AB AG IA: CPT

## 2023-12-14 PROCEDURE — 99203 OFFICE O/P NEW LOW 30 MIN: CPT | Performed by: ADVANCED PRACTICE MIDWIFE

## 2023-12-14 PROCEDURE — 86901 BLOOD TYPING SEROLOGIC RH(D): CPT

## 2023-12-14 PROCEDURE — 86787 VARICELLA-ZOSTER ANTIBODY: CPT

## 2023-12-14 PROCEDURE — 36415 COLL VENOUS BLD VENIPUNCTURE: CPT

## 2023-12-14 RX ORDER — PYRIDOXINE HCL (VITAMIN B6) 25 MG
25 TABLET ORAL DAILY
Qty: 30 TABLET | Refills: 1 | Status: SHIPPED | OUTPATIENT
Start: 2023-12-14

## 2023-12-14 NOTE — PROGRESS NOTES
S Provider New OB Note    Agency  through [employer] utilized for this visit  Reviewed RN intake note.    Ann is a 23 year old female who presents to clinic for a new OB visit.   at Unknown with Estimated Date of Delivery: Aug 4, 2024 based on LMP. Feels well. Has  started PNV.   She has had bleeding since her LMP.  The bleeding  was pinkish,  scant on 2 occasions sometime last week  and on 23, and was not accompanied by cramping-resolved.  She has had mild nausea. Weight loss has not occurred.   This was a planned pregnancy.   Partner is involved,  Andrew   OTHER CONCERNS: sore throat, nasal congestion and runny nose. Denies any cough, SOB, fever, or headaches. Has tried chamomile tea and its helping. Nothing makes the symptoms better or worse.  She has an appointment on 01/3/24 to discuss about health insurance  Reports some pain with intercourse, vaginal itchiness, burning and normal white discharge. She denies any urinary urgency, frequency, hematuria or dysuria.    She stays with her partner and son but misses family in [place] although she regularly does face time and calls them.  She reports  a safe relationship and a supportive partner.    PSYCHIATRIC:  Denies difficulty concentrating, depression, anxiety, panic attacks, or post partum depression.  Has History of difficulty concentrating, depression, anxiety, panic attacks, or post partum depression    PHQ-9 score:will be completed next visit    Past History:  Her past medical history   Past Medical History:   Diagnosis Date    History of ectopic pregnancy 10/29/2021    required R salpingectomy due to rupture   .   She has a history of  prior  section due to oligohydramnios. Salpingectomy due to ectopic pregnancy in   Since her last LMP she denies use of alcohol, tobacco and street drugs.  HISTORY:  Family History   Problem Relation Age of Onset    No Known Problems Mother     No Known Problems Father     No Known Problems  Maternal Grandmother     No Known Problems Maternal Grandfather     No Known Problems Paternal Grandmother     No Known Problems Paternal Grandfather     No Known Problems Brother     No Known Problems Sister     No Known Problems Sister     No Known Problems Sister     No Known Problems Son      Social History     Socioeconomic History    Marital status:    Tobacco Use    Smoking status: Never    Smokeless tobacco: Never   Substance and Sexual Activity    Alcohol use: Not Currently    Drug use: Never    Sexual activity: Yes     Partners: Male     Current Outpatient Medications   Medication Sig    acetaminophen (TYLENOL) 500 MG tablet Take 500 mg by mouth    clotrimazole (LOTRIMIN) 1 % vaginal cream Place 1 Applicatorful vaginally at bedtime for 7 days    doxylamine (UNISOM) 25 MG TABS tablet Take 1 tablet (25 mg) by mouth at bedtime    Prenatal Vit-Fe Fumarate-FA (PRENATAL MULTIVITAMIN W/IRON) 27-0.8 MG tablet Take 1 tablet by mouth    pyridOXINE (VITAMIN B6) 25 MG tablet Take 1 tablet (25 mg) by mouth daily     No current facility-administered medications for this visit.     Allergies   Allergen Reactions    Amoxicillin Rash       ============================================  MEDICAL HISTORY  Past Medical History:   Diagnosis Date    History of ectopic pregnancy 10/29/2021    required R salpingectomy due to rupture     Past Surgical History:   Procedure Laterality Date    C/SECTION, LOW TRANSVERSE Bilateral 2018 due to oligohydramnios    LAPAROSCOPIC SALPINGECTOMY Right 2021    d/t ruptured ectopic pregnancy       OB History    Para Term  AB Living   4 1 1 0 2 1   SAB IAB Ectopic Multiple Live Births   1 0 1 0 1      # Outcome Date GA Lbr Mauricio/2nd Weight Sex Delivery Anes PTL Lv   4 Current            3 2022     SAB      2 Ectopic 21     ECTOPIC   FD      Birth Comments: s/p right salpingectomy with removal of ruptured ectopic pregnancy   1 Term 18    M CS-LTranv  "EPI  ALEJANDRO      Birth Comments: In [facility]      Name: Alexandr Monet       Reviewed genetic history form       GYN History- No Abnormal Pap Smears, last 2021-normal                        Cervical procedures: none                        History of STI: no    I personally reviewed the past social/family/medical and surgical history on the date of service.     OBJECTIVE:  /74   Pulse 75   Ht 1.549 m (5' 1\")   Wt 64 kg (141 lb)   LMP 10/22/2023 (Exact Date)   BMI 26.64 kg/m    ROS: 10 point ROS neg other than the symptoms noted above in the HPI.    EXAM:  /74   Pulse 75   Ht 1.549 m (5' 1\")   Wt 64 kg (141 lb)   LMP 10/22/2023 (Exact Date)   BMI 26.64 kg/m     EXAM:  GENERAL:  Pleasant pregnant female, alert, cooperative  and well groomed.  SKIN:  Warm and dry, without lesions or rashes  HEAD: Symmetrical features.  MOUTH:  Buccal mucosa pink, moist without lesions.  Teeth in fair repair.    NECK:   Tenderness on submandibular lymph nodes  LUNGS:  Clear to auscultation.  BREAST:    No dominant, fixed or suspicious masses are noted.  No skin or nipple changes or axillary nodes.   Nipples everted.      HEART:  RRR without murmur.  ABDOMEN: Soft without masses , or organomegaly.  No CVA tenderness.  LLQ tenderness with palpation  MUSCULOSKELETAL:  Full range of motion  EXTREMITIES:  No edema. No significant varicosities.   PELVIC EXAM:  GENITALIA: EGBUS  External genitalia normal. Vulva reveals no erythema or lesions.         VAGINA:  pink, normal rugae and discharge, no lesions. Swabs collected for multiplex vaginal panel and gonorrhea and chlamydia  Remainder of the pelvic exam deferred  Recommended Flu Vaccine.  Patient declined, do not offer      ASSESSMENT:  23 year old , Unknown weeks of pregnancy with JOSE ANTONIO of Aug 4, 2024 by US confirms  Intrauterine pregnancy 6w4d inconsistent with dates  Genetic Screening: First Trimester Screen    ICD-10-CM    1. High-risk pregnancy, unspecified " trimester  O09.90 ABO/Rh type and screen     Rubella Antibody IgG     Hepatitis B Surface Antibody     Hepatitis B surface antigen     Hepatitis C antibody     Vitamin D Deficiency     HIV Antigen Antibody Combo Cascade     CBC with platelets     Treponema Abs w Reflex to RPR and Titer     Varicella Zoster Virus Antibody IgG     Chlamydia trachomatis/Neisseria gonorrhoeae by PCR     [facility] Referral - Pregnancy     US OB < 14 Weeks Single Transabdominal     Urine Culture     Multiplex Vaginal Panel by PCR     pyridOXINE (VITAMIN B6) 25 MG tablet     doxylamine (UNISOM) 25 MG TABS tablet     clotrimazole (LOTRIMIN) 1 % vaginal cream     CANCELED: Urine Culture     CANCELED: Multiplex Vaginal Panel by PCR      2. Hx of  section  Z98.891 [facility] Referral - Pregnancy      3. Vaginal itching  N89.8 Multiplex Vaginal Panel by PCR     CANCELED: Multiplex Vaginal Panel by PCR      4. Yeast infection of the vagina  B37.31 clotrimazole (LOTRIMIN) 1 % vaginal cream        Ann was seen today for prenatal care    Orders Placed This Encounter   Procedures    US OB < 14 Weeks Single Transabdominal    Rubella Antibody IgG    Hepatitis B Surface Antibody    Hepatitis B surface antigen    Hepatitis C antibody    Vitamin D Deficiency    HIV Antigen Antibody Combo Cascade    CBC with platelets    Treponema Abs w Reflex to RPR and Titer    Varicella Zoster Virus Antibody IgG    [facility] Referral - Pregnancy    ABO/Rh type and screen        ASSESSMENT AND PLAN:    (O09.90) High-risk pregnancy, unspecified trimester  (primary encounter diagnosis)  Comment: - Reviewed use of triage nurse line and contacting the on-call provider after hours for an urgent need such as fever, vagina bleeding, bladder or vaginal infection, rupture of membranes,  or term labor.    -Ordered routine prenatal lab work.   - Reviewed best evidence for: weight gain for her weight and height for pregnancy:  Based on  pre-pregnancy Body mass index is 26.64 kg/m . RECOMMENDED WEIGHT GAIN: 15-25 lbs.  - Reviewed healthy diet and foods to avoid, exercise and activity during pregnancy; avoiding exposure to toxoplasmosis; and maintenance of a generally healthy lifestyle.   - Discussed the harms, benefits, side effects and alternative therapies for current prescribed and OTC medications. Patient was encouraged to start/continue prenatal vitamins as tolerated.  - Oriented to Practice, types of care, and how to reach a provider.  Pt prefers MD team  - Patient received 1st trimester new OB education packet complete with aide of [facility] Expectant Family booklet including information on genetic screening test options.  - Patient desires 1st trimester screening and desires level II ultrasound. Consult placed to [facility] for genetic screening  - Reviewed risk for gestational diabetes d/t No known risk factors for GDM,   - Reviewed risk for Pre Eclampsia d/t No known risk factors of High risk for Pre E or meets two or more of the moderate risk factors including Sociodemographic characteristics (Racism, Less access given lower SES) not a candidate for ASA  - The patient  does not have a history of spontaneous  birth so  WILL NOT consider serial transvaginal cervical length ultrasounds from 16-24 weeks.   -The patient does not have a history of immunosuppresion or HIV so Toxoplasma IgG/IgM WILL NOT be ordered.  -Assess risk for asymptomatic latent TB (prior infection, recent immigrant from epidemic areas, immunosuppression, living in overcrowded environment):   WILL NOT have PPD skin test or Quantiferon-TB Gold Plus blood draw.   -Prescribed  vitamin B 6 and Unisom for nausea  - on 6 week ultrasound, recommended repeat viability in 2 weeks. Scheduled for 23   -Encouraged to continue using chamomile tea since it helps and add honey to minimize the discomfort from sore throat  Plan: ABO/Rh type and screen, Rubella Antibody IgG,          Hepatitis B Surface Antibody, Hepatitis B         surface antigen, Hepatitis C antibody, Vitamin         D Deficiency, HIV Antigen Antibody Combo         Cascade, CBC with platelets, Treponema Abs w         Reflex to RPR and Titer, Varicella Zoster Virus        Antibody IgG, Chlamydia trachomatis/Neisseria         gonorrhoeae by PCR, [facility] Referral         - Pregnancy, US OB < 14 Weeks Single         Transabdominal, Urine Culture, Multiplex         Vaginal Panel by PCR, pyridOXINE (VITAMIN B6)         25 MG tablet, doxylamine (UNISOM) 25 MG TABS         tablet, CANCELED: Urine Culture, CANCELED:         Multiplex Vaginal Panel by PCR          (Z98.891) Hx of  section  Comment: Plans TOLAC will review later in pregnancy  Plan: [facility] Referral - Pregnancy            (N89.8) Vaginal itching  Comment: sample for multiplex vaginal panel collected, results pending  -Advised to use vaginal lubricant  Plan: Multiplex Vaginal Panel by PCR, CANCELED:         Multiplex Vaginal Panel by PCR          - All pt's questions discussed and answered.  Pt verbalized understanding of and agreement to plan of care.     - Continue scheduled prenatal care and prn if questions or concerns. Will follow up on 23 for prenatal care    ISun, am serving as a scribe; to document services personally performed by  Shantel Oliva DNP, MIRZA, CARLEE, FACNM   based on data collection and the provider's statements to me.     [place]    I agree with the PFSH and ROS as completed by the student, except for changes made by me. The remainder of the encounter scribed by the student. The scribed note accurately reflects my personal services and decisions made by me.  Shantel Oliva DNP, CARLEE, APRN    MIRZA Aguirre CNM

## 2023-12-15 ENCOUNTER — TRANSCRIBE ORDERS (OUTPATIENT)
Dept: MATERNAL FETAL MEDICINE | Facility: CLINIC | Age: 23
End: 2023-12-15
Payer: MEDICAID

## 2023-12-15 DIAGNOSIS — O26.90 PREGNANCY RELATED CONDITION, ANTEPARTUM: Primary | ICD-10-CM

## 2023-12-15 LAB
BACTERIA UR CULT: NORMAL
C TRACH DNA SPEC QL PROBE+SIG AMP: NEGATIVE
HBV SURFACE AB SERPL IA-ACNC: 1.13 M[IU]/ML
HBV SURFACE AB SERPL IA-ACNC: NONREACTIVE M[IU]/ML
HBV SURFACE AG SERPL QL IA: NONREACTIVE
HCV AB SERPL QL IA: NONREACTIVE
HIV 1+2 AB+HIV1 P24 AG SERPL QL IA: NONREACTIVE
N GONORRHOEA DNA SPEC QL NAA+PROBE: NEGATIVE
RUBV IGG SERPL QL IA: 1.69 INDEX
RUBV IGG SERPL QL IA: POSITIVE
T PALLIDUM AB SER QL: NONREACTIVE
VIT D+METAB SERPL-MCNC: 14 NG/ML (ref 20–50)
VZV IGG SER QL IA: 1478 INDEX
VZV IGG SER QL IA: POSITIVE

## 2023-12-17 PROBLEM — E55.9 VITAMIN D DEFICIENCY: Status: ACTIVE | Noted: 2023-12-17

## 2023-12-18 ENCOUNTER — TELEPHONE (OUTPATIENT)
Dept: OBGYN | Facility: CLINIC | Age: 23
End: 2023-12-18
Payer: MEDICAID

## 2023-12-18 RX ORDER — CLOTRIMAZOLE 1 %
1 CREAM WITH APPLICATOR VAGINAL AT BEDTIME
Qty: 45 G | Refills: 0 | Status: SHIPPED | OUTPATIENT
Start: 2023-12-18 | End: 2023-12-25

## 2023-12-18 NOTE — TELEPHONE ENCOUNTER
I did call and speak with Ann with the help of a  to go over that she has a yeast infection. Shantel Oliva CNM sent over a prescription for Lotrimin vaginal cream to help treat this yeast infection.    All questions were answered.

## 2023-12-28 ENCOUNTER — PRENATAL OFFICE VISIT (OUTPATIENT)
Dept: OBGYN | Facility: CLINIC | Age: 23
End: 2023-12-28
Attending: ADVANCED PRACTICE MIDWIFE

## 2023-12-28 ENCOUNTER — ANCILLARY PROCEDURE (OUTPATIENT)
Dept: ULTRASOUND IMAGING | Facility: CLINIC | Age: 23
End: 2023-12-28
Attending: ADVANCED PRACTICE MIDWIFE

## 2023-12-28 VITALS
SYSTOLIC BLOOD PRESSURE: 106 MMHG | DIASTOLIC BLOOD PRESSURE: 65 MMHG | HEIGHT: 61 IN | BODY MASS INDEX: 26.15 KG/M2 | HEART RATE: 63 BPM | WEIGHT: 138.5 LBS

## 2023-12-28 DIAGNOSIS — E55.9 VITAMIN D DEFICIENCY: ICD-10-CM

## 2023-12-28 DIAGNOSIS — O09.90 HIGH-RISK PREGNANCY, UNSPECIFIED TRIMESTER: ICD-10-CM

## 2023-12-28 DIAGNOSIS — Z23 IMMUNIZATION DUE: ICD-10-CM

## 2023-12-28 DIAGNOSIS — O09.91 HRP (HIGH RISK PREGNANCY), FIRST TRIMESTER: Primary | ICD-10-CM

## 2023-12-28 PROCEDURE — 76817 TRANSVAGINAL US OBSTETRIC: CPT

## 2023-12-28 PROCEDURE — 76817 TRANSVAGINAL US OBSTETRIC: CPT | Mod: 26 | Performed by: OBSTETRICS & GYNECOLOGY

## 2023-12-28 PROCEDURE — 99207 PR PRENATAL VISIT: CPT | Performed by: ADVANCED PRACTICE MIDWIFE

## 2023-12-28 PROCEDURE — 99213 OFFICE O/P EST LOW 20 MIN: CPT | Performed by: ADVANCED PRACTICE MIDWIFE

## 2023-12-28 RX ORDER — PYRIDOXINE HCL (VITAMIN B6) 25 MG
25 TABLET ORAL DAILY
Qty: 60 TABLET | Refills: 3 | Status: ON HOLD | OUTPATIENT
Start: 2023-12-28 | End: 2024-05-20

## 2023-12-28 RX ORDER — SIMETHICONE 125 MG
125 TABLET,CHEWABLE ORAL 4 TIMES DAILY PRN
Qty: 90 TABLET | Refills: 3 | Status: SHIPPED | OUTPATIENT
Start: 2023-12-28

## 2023-12-28 ASSESSMENT — PATIENT HEALTH QUESTIONNAIRE - PHQ9: SUM OF ALL RESPONSES TO PHQ QUESTIONS 1-9: 0

## 2023-12-28 NOTE — PROGRESS NOTES
"Subjective:      23 year old  at 8w4d presents for a routine prenatal appointment.       Denies vaginal bleeding or leakage of fluid.  Denies contractions or cramping.    Has not started to  fetal movement.       No HA, visual changes, RUQ or epigastric pain.   The patient presents with the following concerns: Viability US completed today, FHT's 162.    Concerns with nausea and bloating.  Would like prescriptions for these, B6/Unisom and simethicone Rx sent.  Instructed on vitamin D supplement.    Hep B non-immune, will consider vaccine next visit.        Objective:  Vitals:    23 1035   BP: 106/65   Pulse: 63   Weight: 62.8 kg (138 lb 8 oz)   Height: 1.549 m (5' 1\")     See OB flowsheet    Assessment/Plan  HRP (high risk pregnancy), first trimester       Orders Placed This Encounter   Medications    pyridOXINE (VITAMIN B6) 25 MG tablet     Sig: Take 1 tablet (25 mg) by mouth daily     Dispense:  60 tablet     Refill:  3    doxylamine (UNISOM) 25 MG TABS tablet     Sig: Take 1 tablet (25 mg) by mouth at bedtime     Dispense:  60 tablet     Refill:  3    simethicone (MYLICON) 125 MG chewable tablet     Sig: Take 1 tablet (125 mg) by mouth 4 times daily as needed for intestinal gas     Dispense:  90 tablet     Refill:  3    cholecalciferol (VITAMIN D3) 125 mcg (5000 units) capsule     Sig: Take 1 capsule (125 mcg) by mouth daily Take one capsule daily.     Dispense:  90 capsule     Refill:  3       - Reviewed total weight gain, encouraged continued healthy diet and exercise.      - Reviewed why/how to contact provider.    Return to clinic in 4 weeks and prn if questions or concerns.   MIRZA Arredondo CNM               "

## 2023-12-28 NOTE — PATIENT INSTRUCTIONS
Thank you for trusting us with your care!     If you need to contact us for questions about:  Symptoms, Scheduling & Medical Questions; Non-urgent (2-3 day response) Shannon message, Urgent (needing response today) [phone] (if after 3:30pm next day response)   Prescriptions: Please call your Pharmacy   Billing: [facility] [phone] or [facility]:[phone]

## 2024-01-03 ENCOUNTER — APPOINTMENT (OUTPATIENT)
Dept: ADMINISTRATIVE | Facility: CLINIC | Age: 24
End: 2024-01-03
Payer: MEDICAID

## 2024-01-19 ENCOUNTER — PRE VISIT (OUTPATIENT)
Dept: MATERNAL FETAL MEDICINE | Facility: CLINIC | Age: 24
End: 2024-01-19
Payer: MEDICAID

## 2024-01-22 ENCOUNTER — OFFICE VISIT (OUTPATIENT)
Dept: MATERNAL FETAL MEDICINE | Facility: CLINIC | Age: 24
End: 2024-01-22
Attending: OBSTETRICS & GYNECOLOGY

## 2024-01-22 ENCOUNTER — HOSPITAL ENCOUNTER (OUTPATIENT)
Dept: ULTRASOUND IMAGING | Facility: CLINIC | Age: 24
Discharge: HOME | End: 2024-01-22
Attending: OBSTETRICS & GYNECOLOGY

## 2024-01-22 DIAGNOSIS — E55.9 VITAMIN D DEFICIENCY: ICD-10-CM

## 2024-01-22 DIAGNOSIS — O09.90 HIGH-RISK PREGNANCY, UNSPECIFIED TRIMESTER: Primary | ICD-10-CM

## 2024-01-22 DIAGNOSIS — Z31.5 ENCOUNTER FOR PROCREATIVE GENETIC COUNSELING: ICD-10-CM

## 2024-01-22 DIAGNOSIS — O26.90 PREGNANCY RELATED CONDITION, ANTEPARTUM: ICD-10-CM

## 2024-01-22 DIAGNOSIS — O26.90 PREGNANCY RELATED CONDITION, ANTEPARTUM: Primary | ICD-10-CM

## 2024-01-22 DIAGNOSIS — Z98.891 HX OF CESAREAN SECTION: ICD-10-CM

## 2024-01-22 PROCEDURE — 76813 OB US NUCHAL MEAS 1 GEST: CPT

## 2024-01-22 PROCEDURE — 96040 HC GENETIC COUNSELING, EACH 30 MINUTES: CPT

## 2024-01-22 PROCEDURE — 76813 OB US NUCHAL MEAS 1 GEST: CPT | Mod: 26 | Performed by: OBSTETRICS & GYNECOLOGY
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
## 2024-01-22 NOTE — PROGRESS NOTES
"Please see \"Imaging\" tab under \"Chart Review\" for details of today's visit.    Ayana Barr MD PhD  Maternal Fetal Medicine     "

## 2024-03-08 ENCOUNTER — TRANSCRIBE ORDERS (OUTPATIENT)
Dept: MATERNAL FETAL MEDICINE | Facility: CLINIC | Age: 24
End: 2024-03-08
Payer: MEDICAID

## 2024-03-08 ENCOUNTER — HOSPITAL ENCOUNTER (OUTPATIENT)
Dept: ULTRASOUND IMAGING | Facility: CLINIC | Age: 24
Discharge: HOME | End: 2024-03-08
Attending: ADVANCED PRACTICE MIDWIFE

## 2024-03-08 ENCOUNTER — OFFICE VISIT (OUTPATIENT)
Dept: MATERNAL FETAL MEDICINE | Facility: CLINIC | Age: 24
End: 2024-03-08
Attending: ADVANCED PRACTICE MIDWIFE

## 2024-03-08 ENCOUNTER — TRANSCRIBE ORDERS (OUTPATIENT)
Dept: MATERNAL FETAL MEDICINE | Facility: CLINIC | Age: 24
End: 2024-03-08

## 2024-03-08 DIAGNOSIS — O26.90 PREGNANCY RELATED CONDITION, ANTEPARTUM: Primary | ICD-10-CM

## 2024-03-08 DIAGNOSIS — O26.90 PREGNANCY RELATED CONDITION, ANTEPARTUM: ICD-10-CM

## 2024-03-08 DIAGNOSIS — Z36.2 ENCOUNTER FOR FOLLOW-UP ULTRASOUND OF FETAL ANATOMY: Primary | ICD-10-CM

## 2024-03-08 PROCEDURE — 76811 OB US DETAILED SNGL FETUS: CPT

## 2024-03-08 PROCEDURE — 76811 OB US DETAILED SNGL FETUS: CPT | Mod: 26 | Performed by: STUDENT IN AN ORGANIZED HEALTH CARE EDUCATION/TRAINING PROGRAM

## 2024-03-08 PROCEDURE — 99213 OFFICE O/P EST LOW 20 MIN: CPT | Mod: 25 | Performed by: STUDENT IN AN ORGANIZED HEALTH CARE EDUCATION/TRAINING PROGRAM

## 2024-03-08 NOTE — PROGRESS NOTES
"Please see \"Imaging\" tab under \"Chart Review\" for details of today's visit.    Isidra Hatfield    "

## 2024-03-27 ENCOUNTER — APPOINTMENT (OUTPATIENT)
Dept: INTERPRETER SERVICES | Facility: CLINIC | Age: 24
End: 2024-03-27
Payer: MEDICAID

## 2024-04-01 ENCOUNTER — PATIENT OUTREACH (OUTPATIENT)
Dept: CARE COORDINATION | Facility: CLINIC | Age: 24
End: 2024-04-01
Payer: MEDICAID

## 2024-04-01 ENCOUNTER — APPOINTMENT (OUTPATIENT)
Dept: INTERPRETER SERVICES | Facility: CLINIC | Age: 24
End: 2024-04-01
Payer: MEDICAID

## 2024-04-04 ENCOUNTER — OFFICE VISIT (OUTPATIENT)
Dept: MATERNAL FETAL MEDICINE | Facility: CLINIC | Age: 24
End: 2024-04-04
Attending: STUDENT IN AN ORGANIZED HEALTH CARE EDUCATION/TRAINING PROGRAM
Payer: MEDICAID

## 2024-04-04 ENCOUNTER — HOSPITAL ENCOUNTER (OUTPATIENT)
Dept: ULTRASOUND IMAGING | Facility: CLINIC | Age: 24
Discharge: HOME | End: 2024-04-04
Attending: STUDENT IN AN ORGANIZED HEALTH CARE EDUCATION/TRAINING PROGRAM
Payer: MEDICAID

## 2024-04-04 DIAGNOSIS — Z36.2 ENCOUNTER FOR FOLLOW-UP ULTRASOUND OF FETAL ANATOMY: Primary | ICD-10-CM

## 2024-04-04 DIAGNOSIS — Z36.2 ENCOUNTER FOR FOLLOW-UP ULTRASOUND OF FETAL ANATOMY: ICD-10-CM

## 2024-04-04 PROCEDURE — 76816 OB US FOLLOW-UP PER FETUS: CPT | Mod: 26 | Performed by: OBSTETRICS & GYNECOLOGY

## 2024-04-04 PROCEDURE — 76816 OB US FOLLOW-UP PER FETUS: CPT

## 2024-04-04 NOTE — PROGRESS NOTES
Please refer to ultrasound report under 'Imaging' Studies of 'Chart Review' tabs.    Korey Monteiro M.D.

## 2024-04-04 NOTE — NURSING NOTE
Patient here with son today for follow up ultrasound for anatomy not seen previously.  (SP45) used for patient's ultrasound. Patient denies questions or concerns today, is aware of fetal sex, denies changes to medications or pregnancy. Education performed on today's ultrasound. SBAR given to SAVANAH BROWN, see their note in Epic.

## 2024-05-20 ENCOUNTER — HOSPITAL ENCOUNTER (OUTPATIENT)
Facility: CLINIC | Age: 24
End: 2024-05-20
Admitting: ADVANCED PRACTICE MIDWIFE

## 2024-05-20 ENCOUNTER — HOSPITAL ENCOUNTER (OUTPATIENT)
Facility: CLINIC | Age: 24
Discharge: HOME | End: 2024-05-20
Attending: ADVANCED PRACTICE MIDWIFE | Admitting: ADVANCED PRACTICE MIDWIFE
Payer: MEDICAID

## 2024-05-20 VITALS
TEMPERATURE: 97.9 F | RESPIRATION RATE: 16 BRPM | OXYGEN SATURATION: 98 % | SYSTOLIC BLOOD PRESSURE: 106 MMHG | DIASTOLIC BLOOD PRESSURE: 55 MMHG

## 2024-05-20 DIAGNOSIS — J10.1 INFLUENZA B: Primary | ICD-10-CM

## 2024-05-20 PROBLEM — R51.9 HEADACHE: Status: ACTIVE | Noted: 2024-05-20

## 2024-05-20 LAB
ALBUMIN MFR UR ELPH: <6 MG/DL
ALBUMIN SERPL BCG-MCNC: 3.4 G/DL (ref 3.5–5.2)
ALBUMIN UR-MCNC: NEGATIVE MG/DL
ALP SERPL-CCNC: 130 U/L (ref 40–150)
ALT SERPL W P-5'-P-CCNC: 16 U/L (ref 0–50)
ANION GAP SERPL CALCULATED.3IONS-SCNC: 11 MMOL/L (ref 7–15)
APPEARANCE UR: CLEAR
AST SERPL W P-5'-P-CCNC: 19 U/L (ref 0–45)
BILIRUB SERPL-MCNC: 0.2 MG/DL
BILIRUB UR QL STRIP: NEGATIVE
BUN SERPL-MCNC: 5.9 MG/DL (ref 6–20)
CALCIUM SERPL-MCNC: 8.7 MG/DL (ref 8.6–10)
CHLORIDE SERPL-SCNC: 106 MMOL/L (ref 98–107)
COLOR UR AUTO: ABNORMAL
CREAT SERPL-MCNC: 0.4 MG/DL (ref 0.51–0.95)
CREAT UR-MCNC: 30.8 MG/DL
DEPRECATED HCO3 PLAS-SCNC: 21 MMOL/L (ref 22–29)
EGFRCR SERPLBLD CKD-EPI 2021: >90 ML/MIN/1.73M2
ERYTHROCYTE [DISTWIDTH] IN BLOOD BY AUTOMATED COUNT: 12.7 % (ref 10–15)
FLUAV RNA SPEC QL NAA+PROBE: NEGATIVE
FLUBV RNA RESP QL NAA+PROBE: POSITIVE
GLUCOSE SERPL-MCNC: 102 MG/DL (ref 70–99)
GLUCOSE UR STRIP-MCNC: NEGATIVE MG/DL
HCT VFR BLD AUTO: 32.6 % (ref 35–47)
HGB BLD-MCNC: 11.2 G/DL (ref 11.7–15.7)
HGB UR QL STRIP: NEGATIVE
KETONES UR STRIP-MCNC: NEGATIVE MG/DL
LEUKOCYTE ESTERASE UR QL STRIP: NEGATIVE
MCH RBC QN AUTO: 32 PG (ref 26.5–33)
MCHC RBC AUTO-ENTMCNC: 34.4 G/DL (ref 31.5–36.5)
MCV RBC AUTO: 93 FL (ref 78–100)
MUCOUS THREADS #/AREA URNS LPF: PRESENT /LPF
NITRATE UR QL: NEGATIVE
PH UR STRIP: 7.5 [PH] (ref 5–7)
PLATELET # BLD AUTO: 248 10E3/UL (ref 150–450)
POTASSIUM SERPL-SCNC: 4 MMOL/L (ref 3.4–5.3)
PROT SERPL-MCNC: 6.5 G/DL (ref 6.4–8.3)
PROT/CREAT 24H UR: NORMAL MG/G{CREAT}
RBC # BLD AUTO: 3.5 10E6/UL (ref 3.8–5.2)
RBC URINE: 1 /HPF
RSV RNA SPEC NAA+PROBE: NEGATIVE
SARS-COV-2 RNA RESP QL NAA+PROBE: NEGATIVE
SODIUM SERPL-SCNC: 138 MMOL/L (ref 135–145)
SP GR UR STRIP: 1.01 (ref 1–1.03)
SPERM #/AREA URNS HPF: PRESENT /HPF
SQUAMOUS EPITHELIAL: 2 /HPF
UROBILINOGEN UR STRIP-MCNC: NORMAL MG/DL
WBC # BLD AUTO: 7.5 10E3/UL (ref 4–11)
WBC URINE: 1 /HPF

## 2024-05-20 PROCEDURE — 250N000013 HC RX MED GY IP 250 OP 250 PS 637: Performed by: ADVANCED PRACTICE MIDWIFE

## 2024-05-20 PROCEDURE — 59025 FETAL NON-STRESS TEST: CPT | Mod: 26 | Performed by: ADVANCED PRACTICE MIDWIFE

## 2024-05-20 PROCEDURE — 80053 COMPREHEN METABOLIC PANEL: CPT | Performed by: ADVANCED PRACTICE MIDWIFE

## 2024-05-20 PROCEDURE — 258N000003 HC RX IP 258 OP 636: Performed by: ADVANCED PRACTICE MIDWIFE

## 2024-05-20 PROCEDURE — 999N000105 HC STATISTIC NO DOCUMENTATION TO SUPPORT CHARGE

## 2024-05-20 PROCEDURE — 84156 ASSAY OF PROTEIN URINE: CPT | Performed by: ADVANCED PRACTICE MIDWIFE

## 2024-05-20 PROCEDURE — 85027 COMPLETE CBC AUTOMATED: CPT | Performed by: ADVANCED PRACTICE MIDWIFE

## 2024-05-20 PROCEDURE — 96360 HYDRATION IV INFUSION INIT: CPT

## 2024-05-20 PROCEDURE — 96361 HYDRATE IV INFUSION ADD-ON: CPT

## 2024-05-20 PROCEDURE — 81001 URINALYSIS AUTO W/SCOPE: CPT | Performed by: ADVANCED PRACTICE MIDWIFE

## 2024-05-20 PROCEDURE — 96374 THER/PROPH/DIAG INJ IV PUSH: CPT

## 2024-05-20 PROCEDURE — 87637 SARSCOV2&INF A&B&RSV AMP PRB: CPT

## 2024-05-20 PROCEDURE — 99214 OFFICE O/P EST MOD 30 MIN: CPT | Mod: 25 | Performed by: ADVANCED PRACTICE MIDWIFE

## 2024-05-20 PROCEDURE — 250N000011 HC RX IP 250 OP 636: Performed by: ADVANCED PRACTICE MIDWIFE

## 2024-05-20 PROCEDURE — 36415 COLL VENOUS BLD VENIPUNCTURE: CPT | Performed by: ADVANCED PRACTICE MIDWIFE

## 2024-05-20 PROCEDURE — 999N000104 HC STATISTIC NO CHARGE

## 2024-05-20 PROCEDURE — G0463 HOSPITAL OUTPT CLINIC VISIT: HCPCS | Mod: 25

## 2024-05-20 RX ORDER — ONDANSETRON 4 MG/1
4 TABLET, ORALLY DISINTEGRATING ORAL EVERY 6 HOURS PRN
Status: DISCONTINUED | OUTPATIENT
Start: 2024-05-20 | End: 2024-05-20 | Stop reason: HOSPADM

## 2024-05-20 RX ORDER — GUAIFENESIN 200 MG/10ML
200 LIQUID ORAL EVERY 4 HOURS PRN
Qty: 120 ML | Refills: 0 | Status: ON HOLD | OUTPATIENT
Start: 2024-05-20 | End: 2024-07-30

## 2024-05-20 RX ORDER — ACETAMINOPHEN 500 MG
500-1000 TABLET ORAL EVERY 6 HOURS PRN
Qty: 30 TABLET | Refills: 0 | Status: SHIPPED | OUTPATIENT
Start: 2024-05-20

## 2024-05-20 RX ORDER — METOCLOPRAMIDE 10 MG/1
10 TABLET ORAL EVERY 6 HOURS PRN
Status: DISCONTINUED | OUTPATIENT
Start: 2024-05-20 | End: 2024-05-20 | Stop reason: HOSPADM

## 2024-05-20 RX ORDER — METOCLOPRAMIDE HYDROCHLORIDE 5 MG/ML
10 INJECTION INTRAMUSCULAR; INTRAVENOUS EVERY 6 HOURS PRN
Status: DISCONTINUED | OUTPATIENT
Start: 2024-05-20 | End: 2024-05-20 | Stop reason: HOSPADM

## 2024-05-20 RX ORDER — ACETAMINOPHEN 325 MG/1
975 TABLET ORAL EVERY 4 HOURS PRN
Status: DISCONTINUED | OUTPATIENT
Start: 2024-05-20 | End: 2024-05-20 | Stop reason: HOSPADM

## 2024-05-20 RX ORDER — OSELTAMIVIR PHOSPHATE 75 MG/1
75 CAPSULE ORAL 2 TIMES DAILY
Qty: 10 CAPSULE | Refills: 0 | Status: SHIPPED | OUTPATIENT
Start: 2024-05-20 | End: 2024-05-25

## 2024-05-20 RX ORDER — SODIUM CHLORIDE, SODIUM LACTATE, POTASSIUM CHLORIDE, CALCIUM CHLORIDE 600; 310; 30; 20 MG/100ML; MG/100ML; MG/100ML; MG/100ML
INJECTION, SOLUTION INTRAVENOUS
Status: COMPLETED
Start: 2024-05-20 | End: 2024-05-20

## 2024-05-20 RX ORDER — PROCHLORPERAZINE MALEATE 10 MG
10 TABLET ORAL EVERY 6 HOURS PRN
Status: DISCONTINUED | OUTPATIENT
Start: 2024-05-20 | End: 2024-05-20 | Stop reason: HOSPADM

## 2024-05-20 RX ORDER — SODIUM CHLORIDE, SODIUM LACTATE, POTASSIUM CHLORIDE, CALCIUM CHLORIDE 600; 310; 30; 20 MG/100ML; MG/100ML; MG/100ML; MG/100ML
INJECTION, SOLUTION INTRAVENOUS CONTINUOUS
Status: DISCONTINUED | OUTPATIENT
Start: 2024-05-20 | End: 2024-05-20 | Stop reason: HOSPADM

## 2024-05-20 RX ORDER — PROCHLORPERAZINE 25 MG
25 SUPPOSITORY, RECTAL RECTAL EVERY 12 HOURS PRN
Status: DISCONTINUED | OUTPATIENT
Start: 2024-05-20 | End: 2024-05-20 | Stop reason: HOSPADM

## 2024-05-20 RX ORDER — ONDANSETRON 2 MG/ML
4 INJECTION INTRAMUSCULAR; INTRAVENOUS EVERY 6 HOURS PRN
Status: DISCONTINUED | OUTPATIENT
Start: 2024-05-20 | End: 2024-05-20 | Stop reason: HOSPADM

## 2024-05-20 RX ADMIN — ACETAMINOPHEN 975 MG: 325 TABLET, FILM COATED ORAL at 18:34

## 2024-05-20 RX ADMIN — ONDANSETRON 4 MG: 2 INJECTION INTRAMUSCULAR; INTRAVENOUS at 18:35

## 2024-05-20 RX ADMIN — SODIUM CHLORIDE, POTASSIUM CHLORIDE, SODIUM LACTATE AND CALCIUM CHLORIDE 500 ML: 600; 310; 30; 20 INJECTION, SOLUTION INTRAVENOUS at 18:25

## 2024-05-20 ASSESSMENT — ACTIVITIES OF DAILY LIVING (ADL)
ADLS_ACUITY_SCORE: 18
ADLS_ACUITY_SCORE: 33

## 2024-05-20 NOTE — ED NOTES
Pt arrived to ED with complaint of abdominal pain .  Pt reports 24 weeks pregnant.   Pt is having contractions No.   Pt feels urge to push No.   Pt reports water broke No.   Report was called and pt was transferred to L&D Yes.

## 2024-05-20 NOTE — H&P
[facility]  OB Triage Note    Ann Lemus MRN# [id number]   Age: 23 year old YOB: 2000     Date of Admission: 2024 5:19 PM  Date of service: 2024.       History of Present Illness (Resident / Clinician):   Ann Lemus is a patient of [facility]   Patient is a 23 year old  who is 29w1d pregnant with JOSE ANTONIO  Aug 4, 2024, by LMP consistent with 6w4d US. The [facility] for [facility]iness and abdominal pain.    At approximately 1430 today, the patient was in the bathroom voiding when she suddenly developed weakness and dizziness. She is unsure if she lost consciousness for a few seconds. She was able to steady herself on the toilet and did not fall. Following this incident, the patient tried to lay down and rest. She noticed headache and abdominal pain. Her symptoms persisted throughout the afternoon- prompting presentation to triage.     On my evaluation, the patient endorses continued generalized weakness and vertiginous dizziness.  She additionally reports 5/10, achy, intermittent suprapubic abdominal pain. She endorses generalized headache. She has been having URI symptoms for the past two days including subjective fever, sore throat, myalgias, and mild SOB. She denies vomiting, urinary symptoms, abnormal vaginal discharge, vaginal bleeding, LOF, contractions. Fetal movement is normal. No recent falls or trauma. No recent travel. No known sick contacts. Last  AM. Has not tried Tylenol. Last meal 1400. No significant straining while on toilet today.     Pregnancy complicated by:  - Anemia: Second trimester HGB 10.6.   - Hx CST 2/2 oligohydramnios.   - Hx ectoptic  s/p right salpinectomy   - Hep B non-immune     Prenatal labs   Lab Results   Component Value Date    AS Negative 2023    HEPBANG Nonreactive 2023    CHPCRT Negative 2023    GCPCRT Negative 2023    HGB 12.1  2023          Obstetrical History:   She is a 23 year old   Her OB history:   OB History    Para Term  AB Living   4 1 1 0 2 1   SAB IAB Ectopic Multiple Live Births   1 0 1 0 1      # Outcome Date GA Lbr Mauricio/2nd Weight Sex Type Anes PTL Lv   4 Current            3 SAB      SAB      2 Ectopic 21     ECTOPIC   FD      Birth Comments: s/p right salpingectomy with removal of ruptured ectopic pregnancy   1 Term 19    M CS-LTranv EPI  ALEJANDRO      Birth Comments: In [facility]      Name: Alexandr Monet          Immunzations:     Most Recent Immunizations   Administered Date(s) Administered    COVID-19 MONOVALENT 12+ (Pfizer) 2021    HPV9 2022    Influenza Vaccine >6 months,quad, PF 10/29/2021    MMR 2022    TDAP (Adacel,Boostrix) 2022    Varicella 2022     Tdap this pregnancy?: Not yet due.   Flu shot this pregnancy? Declined.   COVID immunized?: Yes.          Past Medical History:     Past Medical History:   Diagnosis Date    History of ectopic pregnancy 10/29/2021    required R salpingectomy due to rupture            Past Surgical History:     Past Surgical History:   Procedure Laterality Date    C/SECTION, LOW TRANSVERSE Bilateral 2018 due to oligohydramnios    LAPAROSCOPIC SALPINGECTOMY Right 2021    d/t ruptured ectopic pregnancy            Family History:     Family History   Problem Relation Age of Onset    No Known Problems Mother     No Known Problems Father     No Known Problems Maternal Grandmother     No Known Problems Maternal Grandfather     No Known Problems Paternal Grandmother     No Known Problems Paternal Grandfather     No Known Problems Brother     No Known Problems Sister     No Known Problems Sister     No Known Problems Sister     No Known Problems Son             Social History:     Patient denies substance use.          Medications:     No current facility-administered medications for this encounter.            Allergies:  "  Amoxicillin         Review of Systems:     See HPI.          Physical Exam:   Vitals:   /55 (BP Location: Left arm, Patient Position: Supine, Cuff Size: Adult Regular)   Temp 97.9  F (36.6  C) (Oral)   Resp 16   LMP 10/22/2023 (Exact Date)   0 lbs 0 oz  Estimated body mass index is 26.17 kg/m  as calculated from the following:    Height as of 23: 1.549 m (5' 1\").    Weight as of 23: 62.8 kg (138 lb 8 oz).    GEN: Awake, alert in no apparent distress   HEENT: Right tonsil edematous- no erythema or exudates. Mild bilateral sinus tenderness to palpation. Normal TM's bilaterally.   NECK: no lymphadenopathy or thryoidomegaly  RESPIRATORY: clear to auscultation bilaterally, no increased work of breathing  BACK:  no costovertebral angle tenderness   CARDIOVASCULAR: RRR, no murmur  ABDOMEN: gravid, mild tenderness to RLQ without rebound or guarding   EXT:  no edema or calf tenderness    Electronic Fetal Monitoring:  O: Baseline rate normal  Variability moderate  Accelerations present  Decelerations not present    Assessment: Category I    Uterine Activity none.    Strip reviewed on unit      Assessment and Plan:   Assessment and Plan:   Ann Lemus is a 23 year old  at 29w1d by LMP consistent with T1US presenting for weakness, dizziness, and abdominal pain. Pregnancy complicated by anemia (HGB 10.6 2024), history CST 2/2 oilgohydramnios, hx ectopic pregnancy () s/p right salpingectomy, and Hep B non-immune.     # URI symptoms   # Dizziness   # Weakness   # Headache   # Abdominal pain, RLQ, mild   Patient reports 2-day history of subjective fever, myalgias, and sore throat. Developed generalized weakness, vertigo, mild abdominal pain, and headache today while in the bathroom. Afebrile with reassuring vitals, including normal BP. Exam with mild bilateral sinus tenderness to palpation, edematous right tonsil, and RLQ tenderness without rebound/guarding. Otherwise " unremarkable including TM/lungs. Tocometer without contractions. Overall, concern for URI leading to dehydration. Will obtain basic labs and treat supportively as below. Less concern for preE/HELLP with normal BP- but will gather baseline labs. Less likely abruption/rupture given mild pain, labor without contractions, UTI/PID without urinary symptoms, yeast infection/BV without symptoms, appendicitis without benign exam. Will sign out to night team and have them follow up workup below.   - CBC, CMP, UA, UPCR, and COVID/flu/influenza pending   - LR 1L   - Zofran/Compazine PRN for nausea   - Tylenol for headache   - Night team to follow up on labs, recheck patient, and determine disposition     # Fetal wellbeing   FHT Cat I.   - Continuous FHT         Neyda Taylor MD    The encounter was performed by me and scribed by the student. The scribed note accurately reflects my personal services and decisions made by me. MIRZA Lyles CNM

## 2024-05-21 NOTE — DISCHARGE INSTRUCTIONS
Learning About When to Call Your Doctor During Pregnancy (After 20 Weeks)  Overview  It's common to have concerns about what might be a problem when you're pregnant. Most pregnancies don't have any serious problems. But it's still important to know when to call your doctor if you have certain symptoms or signs of labor.  These are general suggestions. Your doctor may give you some more information about when to call.  When to call your doctor (after 20 weeks)  Call 911  anytime you think you may need emergency care. For example, call if:  You have severe vaginal bleeding. This means you are soaking through a pad each hour for 2 or more hours.  You have sudden, severe pain in your belly.  You have chest pain, are short of breath, or cough up blood.  You passed out (lost consciousness).  You have a seizure.  You see or feel the umbilical cord.  You think you are about to deliver your baby and can't make it safely to the hospital or birthing center.  Call your doctor now or seek immediate medical care if:  You have vaginal bleeding.  You have belly pain.  You have a fever.  You are dizzy or lightheaded, or you feel like you may faint.  You have signs of a blood clot in your leg (called a deep vein thrombosis), such as:  Pain in the calf, back of the knee, thigh, or groin.  Swelling in your leg or groin.  A color change on the leg or groin. The skin may be reddish or purplish, depending on your usual skin color.  You have symptoms of preeclampsia, such as:  Sudden swelling of your face, hands, or feet.  New vision problems (such as dimness, blurring, or seeing spots).  A severe headache.  You have a sudden release of fluid from your vagina. (You think your water broke.)  You've been having regular contractions for an hour. This means that you've had at least 6 contractions within 1 hour, even after you change your position and drink fluids.  You notice that your baby has stopped moving or is moving less than  "normal.  You have signs of heart failure, such as:  New or increased shortness of breath.  New or worse swelling in your legs, ankles, or feet.  Sudden weight gain, such as more than 2 to 3 pounds in a day or 5 pounds in a week.  Feeling so tired or weak that you cannot do your usual activities.  You have symptoms of a urinary tract infection. These may include:  Pain or burning when you urinate.  A frequent need to urinate without being able to pass much urine.  Pain in the flank, which is just below the rib cage and above the waist on either side of the back.  Blood in your urine.  Watch closely for changes in your health, and be sure to contact your doctor if:  You have vaginal discharge that smells bad.  You feel sad, anxious, or hopeless for more than a few days.  You have skin changes, such as a rash, itching, or a yellow color to your skin.  You have other concerns about your pregnancy.  If you have labor signs at 37 weeks or more  If you have signs of labor at 37 weeks or more, your doctor may tell you to call when your labor becomes more active. Symptoms of active labor include:  Contractions that are regular.  Contractions that are less than 5 minutes apart.  Contractions that are hard to talk through.  Follow-up care is a key part of your treatment and safety. Be sure to make and go to all appointments, and call your doctor if you are having problems. It's also a good idea to know your test results and keep a list of the medicines you take.  Where can you learn more?  Go to https://www.Lockr.net/patiented  Enter N531 in the search box to learn more about \"Learning About When to Call Your Doctor During Pregnancy (After 20 Weeks).\"  Current as of: July 10, 2023               Content Version: 14.0    4617-8579 [employer].   Care instructions adapted under license by your healthcare professional. If you have questions about a medical condition or this instruction, always ask your healthcare " professional. [employer] disclaims any warranty or liability for your use of this information.

## 2024-05-21 NOTE — PROGRESS NOTES
Triage progress note:  Encounter completed with assistance from Mauritanian speaking  () via iPad.    Patient reports that her headache is improved, continues to feel some dizziness.  Denies feeling uterine contractions.    Reports onset of URI symptoms began 3 days ago.    Reviewed lab results with patient and informed her of her influenza B positive results.    Will send Tamiflu prescription for patient along with Tylenol and Guaifenesin.      Patient agrees with plan and feels comfortable discharging to home.  MIRZA Arredondo CNM

## 2024-05-21 NOTE — PLAN OF CARE
Data: Patient admitted to room Tr2 at 1640. Patient is a . Prenatal record reviewed.   OB History    Para Term  AB Living   4 1 1 0 2 1   SAB IAB Ectopic Multiple Live Births   1 0 1 0 1      # Outcome Date GA Lbr Mauricio/2nd Weight Sex Type Anes PTL Lv   4 Current            3 2022     SAB      2 Ectopic 21     ECTOPIC   FD      Birth Comments: s/p right salpingectomy with removal of ruptured ectopic pregnancy   1 Term 19    M CS-LTranv EPI  ALEJANDRO      Birth Comments: In [facility]      Name: Alexandr Monet   .  Medical History:   Past Medical History:   Diagnosis Date    History of ectopic pregnancy 10/29/2021    required R salpingectomy due to rupture   .  Gestational age 29w1d. Vital signs per doc flowsheet. Fetal movement present. Patient reports Abdominal Pain   as reason for admission. Support persons not present. Pt has had fever, headache, sore throat, nausea, and dizziness. Denies pregnancy concerns except for some lower abdominal pain.   Action: Verbal consent for EFM, external fetal monitors applied. Admission assessment completed. Patient instructed to report change in fetal movement, contractions, vaginal leaking of fluid or bleeding, abdominal pain, or any concerns related to the pregnancy to her nurse/physician. Patient oriented to room, call light in reach.   Response: A.King BEJARANO and Dr. Taylor informed of arrvial and history. Plan per provider is tylenol, IV bolus, zofran, lab tests. Patient verbalized understanding of education and verbalized agreement with plan.  used throughout visit.     After IV fluids and tylenol pt feeling better. Lab tests reviewed by DALILA Lei CNM. Pt okay to discharge home. Instructions gone over with pt and she indicates understanding of instructions and plan for follow-up care. She will  prescriptions tomorrow. Discharged via wheelchair at .

## 2024-07-03 LAB — GROUP B STREPTOCOCCUS (EXTERNAL): NEGATIVE

## 2024-07-20 ENCOUNTER — HOSPITAL ENCOUNTER (OUTPATIENT)
Facility: CLINIC | Age: 24
Discharge: HOME | End: 2024-07-21
Attending: ADVANCED PRACTICE MIDWIFE | Admitting: ADVANCED PRACTICE MIDWIFE
Payer: MEDICAID

## 2024-07-20 VITALS
SYSTOLIC BLOOD PRESSURE: 120 MMHG | BODY MASS INDEX: 34.52 KG/M2 | RESPIRATION RATE: 18 BRPM | DIASTOLIC BLOOD PRESSURE: 74 MMHG | TEMPERATURE: 98.8 F | WEIGHT: 160 LBS | HEIGHT: 57 IN

## 2024-07-20 PROCEDURE — G0463 HOSPITAL OUTPT CLINIC VISIT: HCPCS | Mod: 25

## 2024-07-20 ASSESSMENT — ACTIVITIES OF DAILY LIVING (ADL): ADLS_ACUITY_SCORE: 35

## 2024-07-21 PROCEDURE — G0463 HOSPITAL OUTPT CLINIC VISIT: HCPCS | Mod: 25

## 2024-07-21 PROCEDURE — 99214 OFFICE O/P EST MOD 30 MIN: CPT | Mod: 25 | Performed by: ADVANCED PRACTICE MIDWIFE

## 2024-07-21 PROCEDURE — 59025 FETAL NON-STRESS TEST: CPT | Mod: 26 | Performed by: ADVANCED PRACTICE MIDWIFE

## 2024-07-21 ASSESSMENT — ACTIVITIES OF DAILY LIVING (ADL)
ADLS_ACUITY_SCORE: 18
ADLS_ACUITY_SCORE: 18

## 2024-07-21 NOTE — DISCHARGE INSTRUCTIONS
Warning Signs after Having a Baby    Keep this paper on your fridge or somewhere else where you can see it.    Call your provider if you have any of these symptoms up to 12 weeks after having your baby.    Thoughts of hurting yourself or your baby  Pain in your chest or trouble breathing  Severe headache not helped by pain medicine  Eyesight concerns (blurry vision, seeing spots or flashes of light, other changes to eyesight)  Fainting, shaking or other signs of a seizure    Call 9-1-1 if you feel that it is an emergency.     The symptoms below can happen to anyone after giving birth. They can be very serious. Call your provider if you have any of these warning signs.    My provider s phone number: _______________________    Losing too much blood (hemorrhage)    Call your provider if you soak through a pad in less than an hour or pass blood clots bigger than a golf ball. These may be signs that you are bleeding too much.    Blood clots in the legs or lungs    After you give birth, your body naturally clots its blood to help prevent blood loss. Sometimes this increased clotting can happen in other areas of the body, like the legs or lungs. This can block your blood flow and be very dangerous.     Call your provider if you:  Have a red, swollen spot on the back of your leg that is warm or painful when you touch it.   Are coughing up blood.     Infection    Call your provider if you have any of these symptoms:  Fever of 100.4 F (38 C) or higher.  Pain or redness around your stitches if you had an incision.   Any yellow, white, or green fluid coming from places where you had stitches or surgery.    Mood Problems (postpartum depression)    Many people feel sad or have mood changes after having a baby. But for some people, these mood swings are worse.     Call your provider right away if you feel so anxious or nervous that you can't care for yourself or your baby.    Preeclampsia (high blood pressure)    Even if you  didn't have high blood pressure when you were pregnant, you are at risk for the high blood pressure disease called preeclampsia. This risk can last up to 12 weeks after giving birth.     Call your provider if you have:   Pain on your right side under your rib cage  Sudden swelling in the hands and face    Remember: You know your body. If something doesn't feel right, get medical help.     For informational purposes only. Not to replace the advice of your health care provider. Copyright 2020 [facility]. All rights reserved. Clinically reviewed by Inge Morales, RNC-OB, MSN. [employer] 618990 - Rev 02/23.

## 2024-07-21 NOTE — PROGRESS NOTES
HOSPITAL TRIAGE NOTE  ===================    CHIEF COMPLAINT  ========================  Ann Lemus is a 23 year old patient presenting today at 38w0d for evaluation of uterine contractions. Feels lower abdominal cramping/contractions, able to talk through. No leakage of fluid, no vaginal bleeding. She is accompanied by her young son.       Patient's last menstrual period was 10/22/2023 (exact date).  Estimated Date of Delivery: Aug 4, 2024       HPI  ==================     Denies fever, cough, SOB or chest pain. Denies having contact with anyone who is Covid-19 positive. Pt has had Covid-19 Vaccinations.    Prenatal record and labs reviewed from [facility], through [facility].    CONTRACTIONS: every 5-6 minutes. Last 1 minute. Mild to palpation   ABDOMINAL PAIN: cramping, mild   FETAL MOVEMENT: active    VAGINAL BLEEDING: none  RUPTURE OF MEMBRANES: no  PELVIC PAIN: none    PREGNANCY COMPLICATIONS: History of  birth for Oligohydramnios in [facility] (); Ectopic with right salpingectomy ()       # Pain Assessment:      2024    11:01 PM   Current Pain Score   Patient currently in pain? yes   - Ann is experiencing pain due to cramping. Pain management was discussed and the plan was created in a collaborative fashion.  Ann's response to the current recommendations: engaged  - Non-pharmacologic adjuvants: Heat      REVIEW OF SYSTEMS  =====================  C: NEGATIVE for fever, chills  I: NEGATIVE for worrisome rashes, moles or lesions  E: NEGATIVE for vision changes or irritation  R: NEGATIVE for significant cough or SOB  CV: NEGATIVE for chest pain, palpitations or varicosities  GI: NEGATIVE for nausea, abdominal pain, heartburn, or change in bowel habits  : NEGATIVE for frequency, dysuria, or hematuria  M: NEGATIVE for significant arthralgias or myalgia  N: NEGATIVE for headache, weakness, dizziness or paresthesias  P: NEGATIVE for changes in mood  or affect    PROBLEM LIST  ===============  Patient Active Problem List    Diagnosis Date Noted    Headache 2024     Priority: Medium    Influenza B 2024     Priority: Medium     24: Positive influenza, Tamiflu sent.      Immunization due Hep B non-immune 2023     Priority: Medium    Vitamin D deficiency 2023     Priority: Medium     14      High-risk pregnancy, unspecified trimester 2023     Priority: Medium     [facility] Patient Provider Group choice: MD group  Partner's name: Andrew  [x]NOB folder  [x]Dating  [x] 1st trimester screening: MFM referral placed for 1st trimester screening place  [x]Offer AFP after 15 wks  [x]Fetal anatomy US ordered  [x]Rubella immune  [x]Hep B nonimmune  [x]Varicella immune  []Pap  [x] No added risk for PRE-E  [x] No increased risk for GDM  [x]No need for utox in labor  [x]COVID vaccine -needs 2023  _____________________________________  []EOB folder  []PP Contraception plan: If tubal,consent date:  []Labor plans:  []:  []Infant feeding plan  []FLU shot  []TDAP given  []RSV  []Rhogam if needed, date:  []TOLAC consent done  [] Water birth interest  []GCT, passed  ________________________________________  [] OTC PP meds sent  []PP plans, time off, support system discussed, resources offered  []Planning CS-ERAS pkt        Hx of  section 2023     Priority: Medium     , in [facility] d/t oligo      History of ectopic pregnancy 2023     Priority: Medium    History of Ectopic pregnancy 10/29/2021     Priority: Medium    Immigrant with language difficulty 2021     Priority: Medium    Does not have health insurance 2021     Priority: Medium       HISTORIES  ==============  ALLERGIES:      Allergies   Allergen Reactions    Amoxicillin Rash     PAST MEDICAL HISTORY  Past Medical History:   Diagnosis Date    History of ectopic pregnancy 10/29/2021    required R salpingectomy due to rupture      SOCIAL HISTORY  Social History     Socioeconomic History    Marital status:      Spouse name: Andrew    Number of children: 1    Years of education: Not on file    Highest education level: Not on file   Occupational History    Not on file   Tobacco Use    Smoking status: Never    Smokeless tobacco: Never   Substance and Sexual Activity    Alcohol use: Not Currently    Drug use: Never    Sexual activity: Yes     Partners: Male   Other Topics Concern    Not on file   Social History Narrative    Not on file     Social Determinants of Health     Financial Resource Strain: Not on File (2022)    [facility] from [facility]    Financial Resource Strain     Financial Resource Strain: 0   Food Insecurity: No Food Insecurity (2022)    Received from [facility]    Hunger Vital Sign     Worried About Running Out of Food in the Last Year: Never true     Ran Out of Food in the Last Year: Never true   Transportation Needs: Not on File (2022)    Received from [facility]    Transportation Needs     Transportation: 0   Physical Activity: Not on File (2022)    Received from [facility]    Physical Activity     Physical Activity: 0   Stress: Not on File (2022)    Received from [facility]    Stress     Stress: 0   Social Connections: Not on File (2022)    Received from [facility]    Social Connections     Social Connections and Isolation: 0   Interpersonal Safety: Not on file   Housing Stability: Not on File (2022)    Received from [facility]    Housing Stability     Housin       FAMILY HISTORY  Family History   Problem Relation Age of Onset    No Known Problems Mother     No Known Problems Father     No Known Problems Maternal Grandmother     No Known Problems Maternal Grandfather     No Known Problems Paternal Grandmother     No Known Problems Paternal Grandfather     No Known Problems Brother     No Known Problems Sister     No Known Problems  "Sister     No Known Problems Sister     No Known Problems Son      OB HISTORY  OB History    Para Term  AB Living   4 1 1 0 2 1   SAB IAB Ectopic Multiple Live Births   1 0 1 0 1      # Outcome Date GA Lbr Mauricio/2nd Weight Sex Type Anes PTL Lv   4 Current            3 2022     SAB      2 Ectopic 21     ECTOPIC   FD      Birth Comments: s/p right salpingectomy with removal of ruptured ectopic pregnancy   1 Term 19    M CS-LTranv EPI  ALEJANDRO      Birth Comments: In [facility]      Name: Alexandr Monet     Prenatal Labs:   Lab Results   Component Value Date    AS Negative 2023    RUQIGG Positive 2023    HEPBANG Nonreactive 2023    HGB 11.2 (L) 2024    HIAGAB Nonreactive 2023     Rubella- immune    ULTRASOUND(s) reviewed: yes    EXAM  ============  /74   Temp 98.8  F (37.1  C) (Oral)   Resp 18   Ht 1.448 m (4' 9\")   Wt 72.6 kg (160 lb)   LMP 10/22/2023 (Exact Date)   BMI 34.62 kg/m    GENERAL APPEARANCE: healthy, alert and no distress  RESP: lungs clear to auscultation - no rales, rhonchi or wheezes  CV: regular rates and rhythm, normal S1 S2, no S3 or S4 and no murmur,and no varicosities  ABDOMEN:  soft, nontender, no epigastric pain  SKIN: no suspicious lesions or rashes  NEURO: Denies headache, blurred vision, other vision changes  PSYCH: mentation appears normal. and affect normal/bright  MS/ LEGS: Reflexes normal bilaterally    CONTRACTIONS: every 3-7 minutes   FETAL HEART TONES: continuous EFM- baseline 135 with moderate variability and positive accelerations. No decelerations.  NST: REACTIVE      PELVIC EXAM: closed/ 50%/ Posterior/ average/ -2   PRESENTATION: VERTEX  BLOOD: no  DISCHARGE: none    ROM: no  ROMPLUS: not done    LABS: none  DIAGNOSIS  ============  38w0d seen on the Birthplace Triage for labor evaluation.  Previous  - desires labor after  (LAC)  NST: REACTIVE  Fetal Heart Tones:category one  Patient Active Problem List "   Diagnosis    History of Ectopic pregnancy    Immigrant with language difficulty    Does not have health insurance    History of ectopic pregnancy    High-risk pregnancy, unspecified trimester    Hx of  section    Vitamin D deficiency    Immunization due Hep B non-immune    Headache    Influenza B       PLAN  ============  Call or return to the Birthplace with contractions, cramping, abdominal or pelvic pain, vaginal bleeding, leaking fluid or decreased fetal movement.  Reviewed comfort measures at home   Follow up- [place]    MIRZA Mc CNM

## 2024-07-21 NOTE — CARE PLAN
Data: Patient assessed in the Birthplace for uterine contractions. Cervix 0 cm dilated and 50% effaced. Fetal station -2. Membranes intact. Contractions are present. Contactions are  , 2-6 minutes apart, and last  seconds. Uterine assessment is mild by palpation during contractions and soft by palpation at rest. See flowsheets for fetal assessment documentation.     Action: Presumed adequate fetal oxygenation documented. Discharge instructions reviewed. Patient instructed to report change in fetal movement, vaginal leaking of fluid or bleeding, abdominal pain, or any concerns related to the pregnancy to provider/clinic.      Response: Orders to discharge home per Hope Godoy CNM. Patient verbalized understanding of education and agreement with plan. Discharged to home at 0231.

## 2024-07-30 ENCOUNTER — HOSPITAL ENCOUNTER (OUTPATIENT)
Facility: CLINIC | Age: 24
Discharge: HOME | End: 2024-07-30
Attending: FAMILY MEDICINE | Admitting: FAMILY MEDICINE
Payer: MEDICAID

## 2024-07-30 VITALS
SYSTOLIC BLOOD PRESSURE: 114 MMHG | TEMPERATURE: 98.3 F | HEART RATE: 76 BPM | RESPIRATION RATE: 18 BRPM | DIASTOLIC BLOOD PRESSURE: 68 MMHG

## 2024-07-30 DIAGNOSIS — Z36.89 ENCOUNTER FOR TRIAGE IN PREGNANT PATIENT: Primary | ICD-10-CM

## 2024-07-30 PROBLEM — Z98.891 HX OF CESAREAN SECTION: Status: ACTIVE | Noted: 2023-12-14

## 2024-07-30 PROBLEM — Z90.79 H/O UNILATERAL SALPINGECTOMY: Status: ACTIVE | Noted: 2021-11-01

## 2024-07-30 PROBLEM — J10.1 INFLUENZA B: Status: RESOLVED | Noted: 2024-05-20 | Resolved: 2024-07-30

## 2024-07-30 PROBLEM — O34.219 HISTORY OF CESAREAN DELIVERY, CURRENTLY PREGNANT: Status: ACTIVE | Noted: 2023-12-14

## 2024-07-30 PROCEDURE — 250N000013 HC RX MED GY IP 250 OP 250 PS 637

## 2024-07-30 PROCEDURE — 59025 FETAL NON-STRESS TEST: CPT

## 2024-07-30 PROCEDURE — G0463 HOSPITAL OUTPT CLINIC VISIT: HCPCS | Mod: 25

## 2024-07-30 PROCEDURE — 99205 OFFICE O/P NEW HI 60 MIN: CPT | Mod: GC

## 2024-07-30 RX ORDER — HYDROXYZINE HYDROCHLORIDE 25 MG/1
25-50 TABLET, FILM COATED ORAL EVERY 6 HOURS PRN
Status: DISCONTINUED | OUTPATIENT
Start: 2024-07-30 | End: 2024-07-30 | Stop reason: HOSPADM

## 2024-07-30 RX ORDER — PROCHLORPERAZINE 25 MG
25 SUPPOSITORY, RECTAL RECTAL EVERY 12 HOURS PRN
Status: DISCONTINUED | OUTPATIENT
Start: 2024-07-30 | End: 2024-07-30 | Stop reason: HOSPADM

## 2024-07-30 RX ORDER — HYDROXYZINE PAMOATE 25 MG/1
25 CAPSULE ORAL 3 TIMES DAILY PRN
Qty: 30 CAPSULE | Refills: 0 | Status: SHIPPED | OUTPATIENT
Start: 2024-07-30

## 2024-07-30 RX ORDER — METOCLOPRAMIDE HYDROCHLORIDE 5 MG/ML
10 INJECTION INTRAMUSCULAR; INTRAVENOUS EVERY 6 HOURS PRN
Status: DISCONTINUED | OUTPATIENT
Start: 2024-07-30 | End: 2024-07-30 | Stop reason: HOSPADM

## 2024-07-30 RX ORDER — METOCLOPRAMIDE 10 MG/1
10 TABLET ORAL EVERY 6 HOURS PRN
Status: DISCONTINUED | OUTPATIENT
Start: 2024-07-30 | End: 2024-07-30 | Stop reason: HOSPADM

## 2024-07-30 RX ORDER — ONDANSETRON 2 MG/ML
4 INJECTION INTRAMUSCULAR; INTRAVENOUS EVERY 6 HOURS PRN
Status: DISCONTINUED | OUTPATIENT
Start: 2024-07-30 | End: 2024-07-30 | Stop reason: HOSPADM

## 2024-07-30 RX ORDER — PROCHLORPERAZINE MALEATE 10 MG
10 TABLET ORAL EVERY 6 HOURS PRN
Status: DISCONTINUED | OUTPATIENT
Start: 2024-07-30 | End: 2024-07-30 | Stop reason: HOSPADM

## 2024-07-30 RX ORDER — ONDANSETRON 4 MG/1
4 TABLET, ORALLY DISINTEGRATING ORAL EVERY 6 HOURS PRN
Status: DISCONTINUED | OUTPATIENT
Start: 2024-07-30 | End: 2024-07-30 | Stop reason: HOSPADM

## 2024-07-30 RX ADMIN — HYDROXYZINE HYDROCHLORIDE 50 MG: 25 TABLET ORAL at 11:03

## 2024-07-30 ASSESSMENT — ACTIVITIES OF DAILY LIVING (ADL)
ADLS_ACUITY_SCORE: 18
ADLS_ACUITY_SCORE: 35
ADLS_ACUITY_SCORE: 18

## 2024-07-30 NOTE — PLAN OF CARE
Pt presented to rule out labor after experiencing painful contractions at home. Eduardo every 5-10 minutes.Baby has moderate variability with accelerations present. Cervical check 0/50. Pt took 50mg Vistaril and will go home to labor there until signs of labor increase. Pt lives nearby. Discharge instructions explained and understood.

## 2024-07-30 NOTE — DISCHARGE INSTRUCTIONS
Aprenda cuándo llamar a harley médico belinda el embarazo (después de 20 semanas)  Learning About When to Call Your Doctor During Pregnancy (After 20 Weeks)  Instrucciones de cuidado  Es normal que tenga inquietudes acerca de lo que podría ser un problema belinda el embarazo. Aunque la mayoría de las mujeres embarazadas no tienen ningún problema grave, es importante saber cuándo llamar a harley médico si tiene determinados síntomas o señales de trabajo de parto.  Estas son algunas sugerencias generales. Harley médico puede darle más información sobre cuándo llamar.  Cuándo llamar a harley médico (después de 20 semanas)  Llame al 911  en cualquier momento que considere que necesita atención de urgencia. Por ejemplo, llame si:  Tiene sangrado vaginal intenso.  Tiene dolor repentino e intenso en el abdomen.  Se desmayó (perdió el conocimiento).  Tiene cornelia convulsión.  Ve o siente el cordón umbilical.  Lilia que está a punto de pino a corky a harley bebé y no puede llegar en forma colindres al hospital.  Llame a harley médico ahora mismo o busque atención médica inmediata si:  Tiene sangrado vaginal.  Tiene dolor en el abdomen.  Tiene fiebre.  Tiene síntomas de preeclampsia, yvonne:  Hinchazón repentina de la faina, las stuart o los pies.  Nuevos problemas de visión (yvonne oscurecimiento, riana borroso o riana puntos).  Dolor de israel intenso.  Tiene cornelia pérdida repentina de líquido por la vagina. (Piensa que rompió la jeana).  Piensa que puede juve comenzado el trabajo de parto. [place] significa que ha tenido al menos 6 contracciones en cornelia hora.  Nota que harley bebé ha dejado de moverse o lo hace mucho menos de lo habitual.  Tiene síntomas de cornelia infección urinaria. Estos pueden incluir:  Dolor o ardor al orinar.  Necesidad de orinar con frecuencia sin poder eliminar mucha orina.  Dolor en el flanco, que se encuentra lincoln debajo de la caja torácica y arriba de la cintura en un lado de la espalda.  Abhi en la orina.  Preste especial atención a los cambios  "en harley gretchen y asegúrese de comunicarse con harley médico si:  Tiene flujo vaginal con un olor desagradable.  Tiene cambios en la piel, tales yvonne:  Salpullido.  Comezón.  Color amarillento en la piel.  Tiene otras inquietudes acerca de harley embarazo.  Si tiene signos de trabajo de parto al llegar a las 37 semanas o más  Si tiene señales de trabajo de parto a las 37 semanas o más, es posible que harley médico le diga que llame cuando harley trabajo de parto se vuelva más activo. Los síntomas del trabajo de parto activo incluyen:  Contracciones que son regulares.  Contracciones a intervalos de menos de 5 minutos.  Contracciones belinda las cuales es difícil hablar.  La atención de seguimiento es cornelia parte clave de harley tratamiento y seguridad. Asegúrese de hacer y acudir a todas las citas, y llame a harley médico si está teniendo problemas. También es cornelia buena idea saber los resultados de rodriguez exámenes y mantener cornelia lista de los medicamentos que cecilia.   Dónde puede encontrar más información en inglés?  Vaya a [email or web address].net/patientedes  Escriba N531 en la búsqueda para aprender más acerca de \"Aprenda cuándo llamar a harley médico belinda el embarazo (después de 20 semanas).\"  Revisado: 10 tom, 2023               Versión del contenido: 14.0    5394-2220 [employer].   Las instrucciones de cuidado fueron adaptadas bajo licencia por harley profesional de atención médica. Si usted tiene preguntas sobre cornelia afección médica o sobre estas instrucciones, siempre pregunte a harley profesional de gretchen. [employer] niega toda garantía o responsabilidad por harley uso de esta información.    Discharge Instructions for Undelivered Patients    Diet:  * Drink 8 to 12 glasses of liquids (milk, juice, water) every day  * You may eat meals and snacks.    Activity:  * * Count fetal kicks every day (see handout).  * Call your doctor or nurse midwife if your baby is moving less than usual.    Call your provider if you " notice:  * Swelling in your face or increased swelling in your hands or legs.  * Headaches that are not relieved by Tylenol (acetaminophen).  * Changes in your vision (blurring; seeing spots or stars).  * Nausea (sick to your stomach) and vomiting (throwing up).  * Weight gain of 5 pounds per week.  * Heartburn that doesn't go away.  * Signs of bladder infection: Pain when you urinate (use the toilet), needing to go more often or more urgently.  * The bag of castle (membrane) breaks, or you notice leaking in your underwear.  * Bright red blood in your underwear.  * Abdominal (lower belly) or stomach pain.  * For first baby: Contractions (tightenings) less than 5 minutes apart for one hour or more.  * Second (plus) baby: Contractions (tightenings) less than 10 minutes apart and getting stronger.  * Increase or change in vaginal discharge (note the color and amount).

## 2024-07-30 NOTE — PLAN OF CARE
Patient here for labor eval. Monitors applied with permission. Dr. Vidal informed of patient, will see her shortly. Report given to Kjersti Nilssen.

## 2024-07-30 NOTE — PROGRESS NOTES
[facility]  OB Triage Note    Ann Lemus MRN# [id number]   Age: 23 year old YOB: 2000     Date of Admission: 2024 10:54 AM  Date of service: 2024.       History of Present Illness (Resident / Clinician):   Ann Lemus is a patient of MIRZA Day CNM, from [facility].   Patient is a 23 year old  who is 39w2d pregnant with JOSE ANTONIO  Aug 4, 2024, by Patient's last menstrual period was 10/22/2023 (exact date).     The [facility] the [facility] for rule out of labor.    Reports regular contractions starting yesterday afternoon (unsure of exact time) and occurring every 10 minutes. She was seen by her midwife in clinic yesterday with abdominal contractions every 15 minutes but these have become progressively more intense and she is currently experiencing low back pain and abdominal pain. She has not been able to sleep all of last night. She denies fluid leakage. She denies bleeding per vagina. Fetal movement is normal and pain free.    The prenatal course has been complicated by prior  in [facility] (confirmed to be low transverse by patient today), prior ectopic pregnancy with right salpingectomy ()    Prenatal labs   Lab Results   Component Value Date    AS Negative 2023    HEPBANG Nonreactive 2023    CHPCRT Negative 2023    GCPCRT Negative 2023    HGB 11.2 (L) 2024       GBS was collected on 7/3/24 - negative             Obstetrical History:   She is a 23 year old   Her OB history:   OB History    Para Term  AB Living   4 1 1 0 2 1   SAB IAB Ectopic Multiple Live Births   1 0 1 0 1      # Outcome Date GA Lbr Mauricio/2nd Weight Sex Type Anes PTL Lv   4 Current            3 SAB      SAB      2 Ectopic 22     ECTOPIC   FD      Birth Comments: s/p right salpingectomy with removal of ruptured ectopic pregnancy   1 Term 19     M CS-LTranv EPI  ALEJANDRO      Birth Comments: In [facility]      Name: Alexandr Monet              Immunzations:     Most Recent Immunizations   Administered Date(s) Administered    COVID-19 MONOVALENT 12+ (Pfizer) 11/16/2021    HPV9 07/21/2022    Influenza Vaccine >6 months,quad, PF 10/29/2021    MMR 07/21/2022    TDAP (Adacel,Boostrix) 07/21/2022    Varicella 07/21/2022     Tdap this pregnancy?:YES - Date: 5/3/24  Flu shot this pregnancy? No  COVID immunized?: No - only single dose in 11/2021         Past Medical History:     Past Medical History:   Diagnosis Date    History of ectopic pregnancy 10/29/2021    required R salpingectomy due to rupture            Past Surgical History:     Past Surgical History:   Procedure Laterality Date    C/SECTION, LOW TRANSVERSE Bilateral 2018 2018 due to oligohydramnios    LAPAROSCOPIC SALPINGECTOMY Right 11/01/2021    d/t ruptured ectopic pregnancy            Family History:     Family History   Problem Relation Age of Onset    No Known Problems Mother     No Known Problems Father     No Known Problems Maternal Grandmother     No Known Problems Maternal Grandfather     No Known Problems Paternal Grandmother     No Known Problems Paternal Grandfather     No Known Problems Brother     No Known Problems Sister     No Known Problems Sister     No Known Problems Sister     No Known Problems Son             Social History:   no tobacco use  no alcohol use  no illicit drug use    Lives at home with spouse (Andrew) and their child. They live in an apartment and her spouse's sisters and mother visit frequently to help out.          Medications:     Current Facility-Administered Medications   Medication Dose Route Frequency Provider Last Rate Last Admin    metoclopramide (REGLAN) injection 10 mg  10 mg Intravenous Q6H PRN Aditi Poe MD        Or    metoclopramide (REGLAN) tablet 10 mg  10 mg Oral Q6H PRN Aditi Poe MD        ondansetron (ZOFRAN ODT) ODT tab 4 mg  4 mg Oral Q6H  "PRAditi Smith MD        Or    ondansetron (ZOFRAN) injection 4 mg  4 mg Intravenous Q6H PRAditi Smith MD        prochlorperazine (COMPAZINE) injection 10 mg  10 mg Intravenous Q6H PRAditi Smith MD        Or    prochlorperazine (COMPAZINE) tablet 10 mg  10 mg Oral Q6H PRN Aditi Poe MD        Or    prochlorperazine (COMPAZINE) suppository 25 mg  25 mg Rectal Q12H PRN Aditi Poe MD                Allergies:   Amoxicillin         Review of Systems:   CONSTITUTIONAL: no fatigue, no unexpected change in weight  SKIN: no worrisome rashes or lesions  EYES: no acute vision problems or changes  ENT: no ear problems, no mouth problems, no throat problems  RESP: no significant cough, no shortness of breath  CV: no chest pain, no palpitations, no new or worsening peripheral edema  GI: no nausea, no vomiting, no constipation, no diarrhea  : no frequency, no dysuria, no hematuria  NEURO: no weakness, no dizziness, no headaches  ENDOCRINE: no temperature intolerance, no skin/hair changes  PSYCHIATRIC: NEGATIVE for changes in mood or trouble with sleep  PSYCHIATRIC: Positive for sleep disturbance         Physical Exam:   Vitals:   /68   Pulse 76   Temp 98.3  F (36.8  C) (Oral)   Resp 18   LMP 10/22/2023 (Exact Date)   0 lbs 0 oz  Estimated body mass index is 34.62 kg/m  as calculated from the following:    Height as of 7/20/24: 1.448 m (4' 9\").    Weight as of 7/20/24: 72.6 kg (160 lb).    GEN: Awake, alert, lying in bed with  in no apparent distress   HEENT: grossly normal  RESPIRATORY: no increased work of breathing  ABDOMEN: gravid  PELVIC:  no fluid noted, no blood noted.    Cervix: closed/30%/-3  EXT:  1+ edema bilaterally        Electronic Fetal Monitoring:  O: Baseline rate normal  Variability moderate  Accelerations present  Decelerations not present    Assessment: Category I EFM interpretation suggests absence of concern for fetal metabolic acidemia at this time due to " accelerations present, decelerations absent, heart rate: normal baseline, and variability: moderate    Uterine Activity normal.    Strip reviewed at bedside    NST interpretation:  Ordered by  Aditi Poe MD  Start time 0910  Stop time 1000  Baseline rate  normal  Accelerations present  Decelerations not present  Interpretation: reactive      Assessment and Plan:   Assessment:   Ann Lemus is a 23 year old  at 39w2d not in labor, cervix closed, 30% effaced and soft.   Medically Ready for Discharge: Ready Now    Patient Active Problem List   Diagnosis    History of Ectopic pregnancy    Immigrant with language difficulty    Does not have health insurance    High-risk pregnancy, unspecified trimester    Hx of  section    Vitamin D deficiency    Immunization due Hep B non-immune    Headache    Encounter for triage in pregnant patient         Plan:       # Disposition and coordination:  Patient was offered the option to stay for 2 hours for observation, patient has elected to return home at this time.   - Comfort measures at home were reviewed with the patient and her spouse.  - Counseled to return to the Birthplace if contractions become more frequent (every 3-5 minutes and last for 60 seconds each), if she experiences vaginal bleeding or leaking of fluid from the vagina, if there is decreased fetal movement, or if contractions become so painful that she is unable to talk through them.   - Estimated date of delivery: 24    -Patient has not slept since yesterday 2/2 discomfort from contractions every 10 minutes. Provided with hydroxizine as a sleep aid while in triage.     Has follow up scheduled at [facility] on 24.           Aditi Poe MD

## 2024-07-31 ENCOUNTER — ANESTHESIA EVENT (OUTPATIENT)
Dept: OBGYN | Facility: CLINIC | Age: 24
End: 2024-07-31

## 2024-07-31 ENCOUNTER — HOSPITAL ENCOUNTER (INPATIENT)
Facility: CLINIC | Age: 24
LOS: 3 days | Discharge: HOME | End: 2024-08-03
Attending: STUDENT IN AN ORGANIZED HEALTH CARE EDUCATION/TRAINING PROGRAM | Admitting: STUDENT IN AN ORGANIZED HEALTH CARE EDUCATION/TRAINING PROGRAM
Payer: MEDICAID

## 2024-07-31 LAB
ABO/RH(D): NORMAL
ANTIBODY SCREEN: NEGATIVE
ERYTHROCYTE [DISTWIDTH] IN BLOOD BY AUTOMATED COUNT: 13.7 % (ref 10–15)
HCT VFR BLD AUTO: 33.1 % (ref 35–47)
HGB BLD-MCNC: 11.8 G/DL (ref 11.7–15.7)
MCH RBC QN AUTO: 33 PG (ref 26.5–33)
MCHC RBC AUTO-ENTMCNC: 35.6 G/DL (ref 31.5–36.5)
MCV RBC AUTO: 93 FL (ref 78–100)
PLATELET # BLD AUTO: 259 10E3/UL (ref 150–450)
RBC # BLD AUTO: 3.58 10E6/UL (ref 3.8–5.2)
SPECIMEN EXPIRATION DATE: NORMAL
WBC # BLD AUTO: 12.4 10E3/UL (ref 4–11)

## 2024-07-31 PROCEDURE — 120N000001 HC R&B MED SURG/OB

## 2024-07-31 PROCEDURE — G0463 HOSPITAL OUTPT CLINIC VISIT: HCPCS

## 2024-07-31 PROCEDURE — 86780 TREPONEMA PALLIDUM: CPT | Performed by: STUDENT IN AN ORGANIZED HEALTH CARE EDUCATION/TRAINING PROGRAM

## 2024-07-31 PROCEDURE — 86900 BLOOD TYPING SEROLOGIC ABO: CPT | Performed by: STUDENT IN AN ORGANIZED HEALTH CARE EDUCATION/TRAINING PROGRAM

## 2024-07-31 PROCEDURE — 36415 COLL VENOUS BLD VENIPUNCTURE: CPT | Performed by: STUDENT IN AN ORGANIZED HEALTH CARE EDUCATION/TRAINING PROGRAM

## 2024-07-31 PROCEDURE — 85027 COMPLETE CBC AUTOMATED: CPT | Performed by: STUDENT IN AN ORGANIZED HEALTH CARE EDUCATION/TRAINING PROGRAM

## 2024-07-31 RX ORDER — CITRIC ACID/SODIUM CITRATE 334-500MG
30 SOLUTION, ORAL ORAL
Status: COMPLETED | OUTPATIENT
Start: 2024-07-31 | End: 2024-08-01

## 2024-07-31 RX ORDER — SODIUM CHLORIDE, SODIUM LACTATE, POTASSIUM CHLORIDE, CALCIUM CHLORIDE 600; 310; 30; 20 MG/100ML; MG/100ML; MG/100ML; MG/100ML
INJECTION, SOLUTION INTRAVENOUS CONTINUOUS
Status: DISCONTINUED | OUTPATIENT
Start: 2024-07-31 | End: 2024-08-01

## 2024-07-31 RX ORDER — OXYTOCIN 10 [USP'U]/ML
10 INJECTION, SOLUTION INTRAMUSCULAR; INTRAVENOUS
Status: DISCONTINUED | OUTPATIENT
Start: 2024-07-31 | End: 2024-08-01

## 2024-07-31 RX ORDER — CARBOPROST TROMETHAMINE 250 UG/ML
250 INJECTION, SOLUTION INTRAMUSCULAR
Status: DISCONTINUED | OUTPATIENT
Start: 2024-07-31 | End: 2024-08-01

## 2024-07-31 RX ORDER — OXYTOCIN/0.9 % SODIUM CHLORIDE 30/500 ML
100-340 PLASTIC BAG, INJECTION (ML) INTRAVENOUS CONTINUOUS PRN
Status: DISCONTINUED | OUTPATIENT
Start: 2024-07-31 | End: 2024-08-01

## 2024-07-31 RX ORDER — OXYTOCIN/0.9 % SODIUM CHLORIDE 30/500 ML
340 PLASTIC BAG, INJECTION (ML) INTRAVENOUS CONTINUOUS PRN
Status: DISCONTINUED | OUTPATIENT
Start: 2024-07-31 | End: 2024-08-01

## 2024-07-31 RX ORDER — MISOPROSTOL 200 UG/1
400 TABLET ORAL
Status: DISCONTINUED | OUTPATIENT
Start: 2024-07-31 | End: 2024-08-01

## 2024-07-31 RX ORDER — LOPERAMIDE HCL 2 MG
2 CAPSULE ORAL
Status: DISCONTINUED | OUTPATIENT
Start: 2024-07-31 | End: 2024-08-01

## 2024-07-31 RX ORDER — METOCLOPRAMIDE HYDROCHLORIDE 5 MG/ML
10 INJECTION INTRAMUSCULAR; INTRAVENOUS EVERY 6 HOURS PRN
Status: DISCONTINUED | OUTPATIENT
Start: 2024-07-31 | End: 2024-08-01

## 2024-07-31 RX ORDER — ONDANSETRON 2 MG/ML
4 INJECTION INTRAMUSCULAR; INTRAVENOUS EVERY 6 HOURS PRN
Status: DISCONTINUED | OUTPATIENT
Start: 2024-07-31 | End: 2024-08-01

## 2024-07-31 RX ORDER — LOPERAMIDE HCL 2 MG
4 CAPSULE ORAL
Status: DISCONTINUED | OUTPATIENT
Start: 2024-07-31 | End: 2024-08-01

## 2024-07-31 RX ORDER — CEFAZOLIN SODIUM/WATER 2 G/20 ML
2 SYRINGE (ML) INTRAVENOUS
Status: COMPLETED | OUTPATIENT
Start: 2024-07-31 | End: 2024-08-01

## 2024-07-31 RX ORDER — TRANEXAMIC ACID 10 MG/ML
1 INJECTION, SOLUTION INTRAVENOUS EVERY 30 MIN PRN
Status: DISCONTINUED | OUTPATIENT
Start: 2024-07-31 | End: 2024-08-01

## 2024-07-31 RX ORDER — MISOPROSTOL 200 UG/1
800 TABLET ORAL
Status: DISCONTINUED | OUTPATIENT
Start: 2024-07-31 | End: 2024-08-01

## 2024-07-31 RX ORDER — METOCLOPRAMIDE 10 MG/1
10 TABLET ORAL EVERY 6 HOURS PRN
Status: DISCONTINUED | OUTPATIENT
Start: 2024-07-31 | End: 2024-08-01

## 2024-07-31 RX ORDER — METHYLERGONOVINE MALEATE 0.2 MG/ML
200 INJECTION INTRAVENOUS
Status: DISCONTINUED | OUTPATIENT
Start: 2024-07-31 | End: 2024-08-01

## 2024-07-31 RX ORDER — ACETAMINOPHEN 325 MG/1
975 TABLET ORAL ONCE
Status: COMPLETED | OUTPATIENT
Start: 2024-07-31 | End: 2024-08-01

## 2024-07-31 RX ORDER — PROCHLORPERAZINE 25 MG
25 SUPPOSITORY, RECTAL RECTAL EVERY 12 HOURS PRN
Status: DISCONTINUED | OUTPATIENT
Start: 2024-07-31 | End: 2024-08-01

## 2024-07-31 RX ORDER — PROCHLORPERAZINE MALEATE 5 MG
10 TABLET ORAL EVERY 6 HOURS PRN
Status: DISCONTINUED | OUTPATIENT
Start: 2024-07-31 | End: 2024-08-01

## 2024-07-31 RX ORDER — ONDANSETRON 4 MG/1
4 TABLET, ORALLY DISINTEGRATING ORAL EVERY 6 HOURS PRN
Status: DISCONTINUED | OUTPATIENT
Start: 2024-07-31 | End: 2024-08-01

## 2024-07-31 RX ORDER — CEFAZOLIN SODIUM/WATER 2 G/20 ML
2 SYRINGE (ML) INTRAVENOUS SEE ADMIN INSTRUCTIONS
Status: DISCONTINUED | OUTPATIENT
Start: 2024-07-31 | End: 2024-08-01

## 2024-07-31 RX ORDER — AZITHROMYCIN 500 MG/1
500 INJECTION, POWDER, LYOPHILIZED, FOR SOLUTION INTRAVENOUS
Status: COMPLETED | OUTPATIENT
Start: 2024-07-31 | End: 2024-08-01

## 2024-07-31 RX ORDER — LIDOCAINE 40 MG/G
CREAM TOPICAL
Status: DISCONTINUED | OUTPATIENT
Start: 2024-07-31 | End: 2024-08-01

## 2024-07-31 ASSESSMENT — ACTIVITIES OF DAILY LIVING (ADL)
FALL_HISTORY_WITHIN_LAST_SIX_MONTHS: NO
WEAR_GLASSES_OR_BLIND: NO
TOILETING_ISSUES: NO
DOING_ERRANDS_INDEPENDENTLY_DIFFICULTY: NO
ADLS_ACUITY_SCORE: 35
WALKING_OR_CLIMBING_STAIRS_DIFFICULTY: NO
DIFFICULTY_EATING/SWALLOWING: NO
CHANGE_IN_FUNCTIONAL_STATUS_SINCE_ONSET_OF_CURRENT_ILLNESS/INJURY: NO
DRESSING/BATHING_DIFFICULTY: NO
DIFFICULTY_COMMUNICATING: NO
HEARING_DIFFICULTY_OR_DEAF: NO
CONCENTRATING,_REMEMBERING_OR_MAKING_DECISIONS_DIFFICULTY: NO

## 2024-08-01 ENCOUNTER — ANESTHESIA (OUTPATIENT)
Dept: OBGYN | Facility: CLINIC | Age: 24
End: 2024-08-01
Payer: MEDICAID

## 2024-08-01 LAB
HGB BLD-MCNC: 10.7 G/DL (ref 11.7–15.7)
T PALLIDUM AB SER QL: NONREACTIVE

## 2024-08-01 PROCEDURE — 36415 COLL VENOUS BLD VENIPUNCTURE: CPT | Performed by: STUDENT IN AN ORGANIZED HEALTH CARE EDUCATION/TRAINING PROGRAM

## 2024-08-01 PROCEDURE — 59510 CESAREAN DELIVERY: CPT | Performed by: NURSE ANESTHETIST, CERTIFIED REGISTERED

## 2024-08-01 PROCEDURE — 250N000009 HC RX 250: Performed by: ANESTHESIOLOGY

## 2024-08-01 PROCEDURE — 250N000009 HC RX 250: Performed by: NURSE ANESTHETIST, CERTIFIED REGISTERED

## 2024-08-01 PROCEDURE — 250N000011 HC RX IP 250 OP 636: Performed by: ANESTHESIOLOGY

## 2024-08-01 PROCEDURE — 59510 CESAREAN DELIVERY: CPT | Performed by: ANESTHESIOLOGY

## 2024-08-01 PROCEDURE — 272N000001 HC OR GENERAL SUPPLY STERILE: Performed by: STUDENT IN AN ORGANIZED HEALTH CARE EDUCATION/TRAINING PROGRAM

## 2024-08-01 PROCEDURE — 360N000076 HC SURGERY LEVEL 3, PER MIN: Performed by: STUDENT IN AN ORGANIZED HEALTH CARE EDUCATION/TRAINING PROGRAM

## 2024-08-01 PROCEDURE — 120N000012 HC R&B POSTPARTUM

## 2024-08-01 PROCEDURE — 250N000011 HC RX IP 250 OP 636: Performed by: STUDENT IN AN ORGANIZED HEALTH CARE EDUCATION/TRAINING PROGRAM

## 2024-08-01 PROCEDURE — G0463 HOSPITAL OUTPT CLINIC VISIT: HCPCS

## 2024-08-01 PROCEDURE — 250N000013 HC RX MED GY IP 250 OP 250 PS 637: Performed by: STUDENT IN AN ORGANIZED HEALTH CARE EDUCATION/TRAINING PROGRAM

## 2024-08-01 PROCEDURE — 250N000011 HC RX IP 250 OP 636: Performed by: NURSE ANESTHETIST, CERTIFIED REGISTERED

## 2024-08-01 PROCEDURE — 85018 HEMOGLOBIN: CPT | Performed by: STUDENT IN AN ORGANIZED HEALTH CARE EDUCATION/TRAINING PROGRAM

## 2024-08-01 PROCEDURE — 370N000017 HC ANESTHESIA TECHNICAL FEE, PER MIN: Performed by: STUDENT IN AN ORGANIZED HEALTH CARE EDUCATION/TRAINING PROGRAM

## 2024-08-01 PROCEDURE — 710N000010 HC RECOVERY PHASE 1, LEVEL 2, PER MIN: Performed by: STUDENT IN AN ORGANIZED HEALTH CARE EDUCATION/TRAINING PROGRAM

## 2024-08-01 PROCEDURE — 258N000003 HC RX IP 258 OP 636: Performed by: STUDENT IN AN ORGANIZED HEALTH CARE EDUCATION/TRAINING PROGRAM

## 2024-08-01 PROCEDURE — 258N000003 HC RX IP 258 OP 636: Performed by: NURSE ANESTHETIST, CERTIFIED REGISTERED

## 2024-08-01 PROCEDURE — 250N000009 HC RX 250: Performed by: STUDENT IN AN ORGANIZED HEALTH CARE EDUCATION/TRAINING PROGRAM

## 2024-08-01 RX ORDER — BISACODYL 10 MG
10 SUPPOSITORY, RECTAL RECTAL DAILY PRN
Status: DISCONTINUED | OUTPATIENT
Start: 2024-08-03 | End: 2024-08-03 | Stop reason: HOSPADM

## 2024-08-01 RX ORDER — AMOXICILLIN 250 MG
2 CAPSULE ORAL 2 TIMES DAILY
Status: DISCONTINUED | OUTPATIENT
Start: 2024-08-01 | End: 2024-08-03 | Stop reason: HOSPADM

## 2024-08-01 RX ORDER — ONDANSETRON 2 MG/ML
INJECTION INTRAMUSCULAR; INTRAVENOUS PRN
Status: DISCONTINUED | OUTPATIENT
Start: 2024-08-01 | End: 2024-08-01

## 2024-08-01 RX ORDER — NALOXONE HYDROCHLORIDE 0.4 MG/ML
0.2 INJECTION, SOLUTION INTRAMUSCULAR; INTRAVENOUS; SUBCUTANEOUS
Status: DISCONTINUED | OUTPATIENT
Start: 2024-08-01 | End: 2024-08-03 | Stop reason: HOSPADM

## 2024-08-01 RX ORDER — IBUPROFEN 400 MG/1
800 TABLET, FILM COATED ORAL EVERY 6 HOURS
Status: DISCONTINUED | OUTPATIENT
Start: 2024-08-02 | End: 2024-08-03 | Stop reason: HOSPADM

## 2024-08-01 RX ORDER — NALOXONE HYDROCHLORIDE 0.4 MG/ML
0.4 INJECTION, SOLUTION INTRAMUSCULAR; INTRAVENOUS; SUBCUTANEOUS
Status: DISCONTINUED | OUTPATIENT
Start: 2024-08-01 | End: 2024-08-03 | Stop reason: HOSPADM

## 2024-08-01 RX ORDER — MODIFIED LANOLIN
OINTMENT (GRAM) TOPICAL
Status: DISCONTINUED | OUTPATIENT
Start: 2024-08-01 | End: 2024-08-03 | Stop reason: HOSPADM

## 2024-08-01 RX ORDER — OXYTOCIN/0.9 % SODIUM CHLORIDE 30/500 ML
340 PLASTIC BAG, INJECTION (ML) INTRAVENOUS CONTINUOUS PRN
Status: DISCONTINUED | OUTPATIENT
Start: 2024-08-01 | End: 2024-08-03 | Stop reason: HOSPADM

## 2024-08-01 RX ORDER — AMOXICILLIN 250 MG
1 CAPSULE ORAL 2 TIMES DAILY
Status: DISCONTINUED | OUTPATIENT
Start: 2024-08-01 | End: 2024-08-03 | Stop reason: HOSPADM

## 2024-08-01 RX ORDER — BUPIVACAINE HYDROCHLORIDE 7.5 MG/ML
INJECTION, SOLUTION INTRASPINAL
Status: COMPLETED | OUTPATIENT
Start: 2024-08-01 | End: 2024-08-01

## 2024-08-01 RX ORDER — OXYTOCIN/0.9 % SODIUM CHLORIDE 30/500 ML
PLASTIC BAG, INJECTION (ML) INTRAVENOUS CONTINUOUS PRN
Status: DISCONTINUED | OUTPATIENT
Start: 2024-08-01 | End: 2024-08-01

## 2024-08-01 RX ORDER — MISOPROSTOL 200 UG/1
800 TABLET ORAL
Status: DISCONTINUED | OUTPATIENT
Start: 2024-08-01 | End: 2024-08-03 | Stop reason: HOSPADM

## 2024-08-01 RX ORDER — ACETAMINOPHEN 325 MG/1
975 TABLET ORAL EVERY 6 HOURS
Status: DISCONTINUED | OUTPATIENT
Start: 2024-08-01 | End: 2024-08-03 | Stop reason: HOSPADM

## 2024-08-01 RX ORDER — METOCLOPRAMIDE HYDROCHLORIDE 5 MG/ML
10 INJECTION INTRAMUSCULAR; INTRAVENOUS EVERY 6 HOURS PRN
Status: DISCONTINUED | OUTPATIENT
Start: 2024-08-01 | End: 2024-08-03 | Stop reason: HOSPADM

## 2024-08-01 RX ORDER — CITRIC ACID/SODIUM CITRATE 334-500MG
SOLUTION, ORAL ORAL
Status: COMPLETED
Start: 2024-08-01 | End: 2024-08-01

## 2024-08-01 RX ORDER — MISOPROSTOL 200 UG/1
400 TABLET ORAL
Status: DISCONTINUED | OUTPATIENT
Start: 2024-08-01 | End: 2024-08-03 | Stop reason: HOSPADM

## 2024-08-01 RX ORDER — TRANEXAMIC ACID 10 MG/ML
1 INJECTION, SOLUTION INTRAVENOUS EVERY 30 MIN PRN
Status: DISCONTINUED | OUTPATIENT
Start: 2024-08-01 | End: 2024-08-03 | Stop reason: HOSPADM

## 2024-08-01 RX ORDER — HYDROCORTISONE 25 MG/G
CREAM TOPICAL 3 TIMES DAILY PRN
Status: DISCONTINUED | OUTPATIENT
Start: 2024-08-01 | End: 2024-08-03 | Stop reason: HOSPADM

## 2024-08-01 RX ORDER — AZITHROMYCIN 500 MG/1
INJECTION, POWDER, LYOPHILIZED, FOR SOLUTION INTRAVENOUS
Status: DISCONTINUED
Start: 2024-08-01 | End: 2024-08-01 | Stop reason: HOSPADM

## 2024-08-01 RX ORDER — LIDOCAINE 40 MG/G
CREAM TOPICAL
Status: DISCONTINUED | OUTPATIENT
Start: 2024-08-01 | End: 2024-08-03 | Stop reason: HOSPADM

## 2024-08-01 RX ORDER — METOCLOPRAMIDE 10 MG/1
10 TABLET ORAL EVERY 6 HOURS PRN
Status: DISCONTINUED | OUTPATIENT
Start: 2024-08-01 | End: 2024-08-03 | Stop reason: HOSPADM

## 2024-08-01 RX ORDER — CARBOPROST TROMETHAMINE 250 UG/ML
250 INJECTION, SOLUTION INTRAMUSCULAR
Status: DISCONTINUED | OUTPATIENT
Start: 2024-08-01 | End: 2024-08-03 | Stop reason: HOSPADM

## 2024-08-01 RX ORDER — MORPHINE SULFATE 1 MG/ML
INJECTION, SOLUTION EPIDURAL; INTRATHECAL; INTRAVENOUS
Status: COMPLETED | OUTPATIENT
Start: 2024-08-01 | End: 2024-08-01

## 2024-08-01 RX ORDER — SIMETHICONE 80 MG
80 TABLET,CHEWABLE ORAL 4 TIMES DAILY PRN
Status: DISCONTINUED | OUTPATIENT
Start: 2024-08-01 | End: 2024-08-03 | Stop reason: HOSPADM

## 2024-08-01 RX ORDER — OXYTOCIN 10 [USP'U]/ML
10 INJECTION, SOLUTION INTRAMUSCULAR; INTRAVENOUS
Status: DISCONTINUED | OUTPATIENT
Start: 2024-08-01 | End: 2024-08-03 | Stop reason: HOSPADM

## 2024-08-01 RX ORDER — OXYCODONE HYDROCHLORIDE 5 MG/1
5 TABLET ORAL EVERY 4 HOURS PRN
Status: DISCONTINUED | OUTPATIENT
Start: 2024-08-01 | End: 2024-08-03 | Stop reason: HOSPADM

## 2024-08-01 RX ORDER — CEFAZOLIN SODIUM/WATER 2 G/20 ML
SYRINGE (ML) INTRAVENOUS
Status: DISCONTINUED
Start: 2024-08-01 | End: 2024-08-01 | Stop reason: HOSPADM

## 2024-08-01 RX ORDER — ONDANSETRON 2 MG/ML
4 INJECTION INTRAMUSCULAR; INTRAVENOUS EVERY 6 HOURS PRN
Status: DISCONTINUED | OUTPATIENT
Start: 2024-08-01 | End: 2024-08-03 | Stop reason: HOSPADM

## 2024-08-01 RX ORDER — ONDANSETRON 4 MG/1
4 TABLET, ORALLY DISINTEGRATING ORAL EVERY 6 HOURS PRN
Status: DISCONTINUED | OUTPATIENT
Start: 2024-08-01 | End: 2024-08-03 | Stop reason: HOSPADM

## 2024-08-01 RX ORDER — LOPERAMIDE HCL 2 MG
4 CAPSULE ORAL
Status: DISCONTINUED | OUTPATIENT
Start: 2024-08-01 | End: 2024-08-03 | Stop reason: HOSPADM

## 2024-08-01 RX ORDER — PROCHLORPERAZINE MALEATE 10 MG
10 TABLET ORAL EVERY 6 HOURS PRN
Status: DISCONTINUED | OUTPATIENT
Start: 2024-08-01 | End: 2024-08-03 | Stop reason: HOSPADM

## 2024-08-01 RX ORDER — ACETAMINOPHEN 325 MG/1
TABLET ORAL
Status: COMPLETED
Start: 2024-08-01 | End: 2024-08-01

## 2024-08-01 RX ORDER — METHYLERGONOVINE MALEATE 0.2 MG/ML
200 INJECTION INTRAVENOUS
Status: DISCONTINUED | OUTPATIENT
Start: 2024-08-01 | End: 2024-08-03 | Stop reason: HOSPADM

## 2024-08-01 RX ORDER — LOPERAMIDE HCL 2 MG
2 CAPSULE ORAL
Status: DISCONTINUED | OUTPATIENT
Start: 2024-08-01 | End: 2024-08-03 | Stop reason: HOSPADM

## 2024-08-01 RX ORDER — ACETAMINOPHEN 325 MG/1
TABLET ORAL
Status: DISCONTINUED
Start: 2024-08-01 | End: 2024-08-01 | Stop reason: HOSPADM

## 2024-08-01 RX ORDER — DEXTROSE, SODIUM CHLORIDE, SODIUM LACTATE, POTASSIUM CHLORIDE, AND CALCIUM CHLORIDE 5; .6; .31; .03; .02 G/100ML; G/100ML; G/100ML; G/100ML; G/100ML
INJECTION, SOLUTION INTRAVENOUS CONTINUOUS
Status: DISCONTINUED | OUTPATIENT
Start: 2024-08-01 | End: 2024-08-03 | Stop reason: HOSPADM

## 2024-08-01 RX ORDER — METHYLERGONOVINE MALEATE 0.2 MG/ML
INJECTION INTRAVENOUS
Status: COMPLETED
Start: 2024-08-01 | End: 2024-08-01

## 2024-08-01 RX ORDER — KETOROLAC TROMETHAMINE 30 MG/ML
INJECTION, SOLUTION INTRAMUSCULAR; INTRAVENOUS PRN
Status: DISCONTINUED | OUTPATIENT
Start: 2024-08-01 | End: 2024-08-01

## 2024-08-01 RX ORDER — PROCHLORPERAZINE 25 MG
25 SUPPOSITORY, RECTAL RECTAL EVERY 12 HOURS PRN
Status: DISCONTINUED | OUTPATIENT
Start: 2024-08-01 | End: 2024-08-03 | Stop reason: HOSPADM

## 2024-08-01 RX ORDER — KETOROLAC TROMETHAMINE 30 MG/ML
30 INJECTION, SOLUTION INTRAMUSCULAR; INTRAVENOUS EVERY 6 HOURS
Status: COMPLETED | OUTPATIENT
Start: 2024-08-01 | End: 2024-08-01

## 2024-08-01 RX ORDER — SCOLOPAMINE TRANSDERMAL SYSTEM 1 MG/1
1 PATCH, EXTENDED RELEASE TRANSDERMAL
Status: DISCONTINUED | OUTPATIENT
Start: 2024-08-01 | End: 2024-08-03 | Stop reason: HOSPADM

## 2024-08-01 RX ADMIN — SODIUM CHLORIDE, SODIUM LACTATE, POTASSIUM CHLORIDE, CALCIUM CHLORIDE AND DEXTROSE MONOHYDRATE: 5; 600; 310; 30; 20 INJECTION, SOLUTION INTRAVENOUS at 05:01

## 2024-08-01 RX ADMIN — KETOROLAC TROMETHAMINE 30 MG: 30 INJECTION, SOLUTION INTRAMUSCULAR at 22:19

## 2024-08-01 RX ADMIN — PROCHLORPERAZINE EDISYLATE 10 MG: 5 INJECTION INTRAMUSCULAR; INTRAVENOUS at 06:51

## 2024-08-01 RX ADMIN — SODIUM CHLORIDE, POTASSIUM CHLORIDE, SODIUM LACTATE AND CALCIUM CHLORIDE 500 ML: 600; 310; 30; 20 INJECTION, SOLUTION INTRAVENOUS at 00:50

## 2024-08-01 RX ADMIN — METOCLOPRAMIDE 10 MG: 5 INJECTION, SOLUTION INTRAMUSCULAR; INTRAVENOUS at 04:47

## 2024-08-01 RX ADMIN — ONDANSETRON 4 MG: 2 INJECTION INTRAMUSCULAR; INTRAVENOUS at 06:00

## 2024-08-01 RX ADMIN — SENNOSIDES AND DOCUSATE SODIUM 2 TABLET: 50; 8.6 TABLET ORAL at 22:19

## 2024-08-01 RX ADMIN — KETOROLAC TROMETHAMINE 30 MG: 30 INJECTION, SOLUTION INTRAMUSCULAR at 02:10

## 2024-08-01 RX ADMIN — TRANEXAMIC ACID 1 G: 10 INJECTION, SOLUTION INTRAVENOUS at 03:54

## 2024-08-01 RX ADMIN — MORPHINE SULFATE 0.15 MG: 1 INJECTION, SOLUTION EPIDURAL; INTRATHECAL; INTRAVENOUS at 01:09

## 2024-08-01 RX ADMIN — ACETAMINOPHEN 975 MG: 325 TABLET, FILM COATED ORAL at 00:51

## 2024-08-01 RX ADMIN — Medication 340 ML/HR: at 01:34

## 2024-08-01 RX ADMIN — Medication 30 ML: at 00:51

## 2024-08-01 RX ADMIN — SODIUM CHLORIDE, POTASSIUM CHLORIDE, SODIUM LACTATE AND CALCIUM CHLORIDE: 600; 310; 30; 20 INJECTION, SOLUTION INTRAVENOUS at 00:55

## 2024-08-01 RX ADMIN — METHYLERGONOVINE MALEATE 200 MCG: 0.2 INJECTION INTRAVENOUS at 03:55

## 2024-08-01 RX ADMIN — KETOROLAC TROMETHAMINE 30 MG: 30 INJECTION, SOLUTION INTRAMUSCULAR at 15:41

## 2024-08-01 RX ADMIN — PHENYLEPHRINE HYDROCHLORIDE 0.3 MCG/KG/MIN: 10 INJECTION INTRAVENOUS at 01:15

## 2024-08-01 RX ADMIN — SCOPALAMINE 1 PATCH: 1 PATCH, EXTENDED RELEASE TRANSDERMAL at 13:30

## 2024-08-01 RX ADMIN — ACETAMINOPHEN 975 MG: 325 TABLET ORAL at 00:51

## 2024-08-01 RX ADMIN — Medication 2 G: at 01:05

## 2024-08-01 RX ADMIN — SODIUM CITRATE AND CITRIC ACID MONOHYDRATE 30 ML: 500; 334 SOLUTION ORAL at 00:51

## 2024-08-01 RX ADMIN — ACETAMINOPHEN 975 MG: 325 TABLET, FILM COATED ORAL at 22:19

## 2024-08-01 RX ADMIN — ONDANSETRON 4 MG: 2 INJECTION INTRAMUSCULAR; INTRAVENOUS at 01:10

## 2024-08-01 RX ADMIN — BUPIVACAINE HYDROCHLORIDE IN DEXTROSE 1.6 ML: 7.5 INJECTION, SOLUTION SUBARACHNOID at 01:09

## 2024-08-01 RX ADMIN — ACETAMINOPHEN 975 MG: 325 TABLET, FILM COATED ORAL at 15:42

## 2024-08-01 RX ADMIN — AZITHROMYCIN 500 MG: 500 INJECTION, POWDER, LYOPHILIZED, FOR SOLUTION INTRAVENOUS at 00:51

## 2024-08-01 RX ADMIN — KETOROLAC TROMETHAMINE 30 MG: 30 INJECTION, SOLUTION INTRAMUSCULAR at 10:02

## 2024-08-01 RX ADMIN — AZITHROMYCIN MONOHYDRATE 500 MG: 500 INJECTION, POWDER, LYOPHILIZED, FOR SOLUTION INTRAVENOUS at 00:51

## 2024-08-01 ASSESSMENT — ACTIVITIES OF DAILY LIVING (ADL)
ADLS_ACUITY_SCORE: 18
ADLS_ACUITY_SCORE: 24
ADLS_ACUITY_SCORE: 18
ADLS_ACUITY_SCORE: 25
ADLS_ACUITY_SCORE: 25
ADLS_ACUITY_SCORE: 24
ADLS_ACUITY_SCORE: 25
ADLS_ACUITY_SCORE: 24
ADLS_ACUITY_SCORE: 18
ADLS_ACUITY_SCORE: 25
ADLS_ACUITY_SCORE: 25
ADLS_ACUITY_SCORE: 18
ADLS_ACUITY_SCORE: 18
ADLS_ACUITY_SCORE: 24
ADLS_ACUITY_SCORE: 25
ADLS_ACUITY_SCORE: 25

## 2024-08-01 NOTE — PLAN OF CARE
Goal Outcome Evaluation:      Plan of Care Reviewed With: patient, spouse    Overall Patient Progress: improvingOverall Patient Progress: improving     Vital signs stable. Pain is under control with Tylenol and Toradol. Was nauseated most of the day but felt better once getting the scopalamine patch. She ambulated to the bathroom and did alessandra cares with assist of this RN. She tolerated this activity well. She is breastfeeding her baby every 3 hours independently. IV was saline locked. She has been very sleepy today and not eating or drinking much. This RN keeps encouraging patient to take in fluids as tolerated. Will continue to leave desir in until this evening to watch output more closely and to make sure patient tolerates more activity okay. Encouraged to call with questions or concerns.

## 2024-08-01 NOTE — PROGRESS NOTES
Data: Patient presented to [facility]: 2024 10:50 PM.  Reason for maternal/fetal assessment is uterine contractions. Patient reports contractions for last 3 days with no relief or cervical change. Patient denies leaking of vaginal fluid/rupture of membranes, vaginal bleeding, nausea, vomiting, headache, visual disturbances, epigastric or RUQ pain, significant edema. Patient reports fetal movement is normal. Patient is a 39w3d . Prenatal record reviewed. Pregnancy has been complicated by prior  section . Support person is present.     Fetal HR baseline was 140 , variability is moderate . Accelerations: not present . Decelerations: not present . Uterine assessment is moderate  during contractions and  soft at rest. Cervix 2 cm dilated and 80% effaced. Fetal station -1. Fetal presentation   per Le[place]olds. Membranes: intact.    Vital signs wnl. Patient reports pain and is not coping.     Action: Verbal consent for EFM. Triage assessment completed. Patient does not meet criteria for early labor discharge.     Response: Patient verbalized agreement with plan. Will contact Dr. Chi with update and for further orders.

## 2024-08-01 NOTE — ANESTHESIA POSTPROCEDURE EVALUATION
Patient: Ann Lemus    Procedure: Procedure(s):   section       Anesthesia Type:  Spinal    Note:  Disposition: Admission   Postop Pain Control: Uneventful            Sign Out: Well controlled pain   PONV: No   Neuro/Psych: Uneventful            Sign Out: Acceptable/Baseline neuro status   Airway/Respiratory: Uneventful            Sign Out: Acceptable/Baseline resp. status   CV/Hemodynamics: Uneventful            Sign Out: Acceptable CV status; No obvious hypovolemia; No obvious fluid overload   Other NRE: NONE   DID A NON-ROUTINE EVENT OCCUR? No           Last vitals:  Vitals Value Taken Time   /65 24   Temp 36.4  C (97.6  F) 24   Pulse 82 24   Resp 19 24   SpO2 97 % 24       Electronically Signed By: Meredith Roman MD  2024  2:43 AM

## 2024-08-01 NOTE — PLAN OF CARE
Goal Outcome Evaluation:      Plan of Care Reviewed With: patient, spouse    Overall Patient Progress: no changeOverall Patient Progress: no change         Vital signs stable. Postpartum assessment WDL. Fundus firm with scant flow, however 1 cm above umbilicus. Incision UTV, dressing CDI. Abdominal binder in place. Pain controlled with Duramorph, Tylenol, & Toradol. Patient on bedrest. Patient denies passing gas. Patient tolerating clear liquids. Breastfeeding on cue with minimal assist. Patient and infant bonding well. Plan of care ongoing.    Ct Smith RN on 8/1/2024 at 6:19 AM

## 2024-08-01 NOTE — OP NOTE
[facility]   Full Operative Progress Note     Surgery Date:  2024  Surgeon:  Anita Chi MD   Assistants:  OR staff  Pre-op Diagnosis:  1. Intrauterine pregnancy at 39w4d     2. History of CS x1; declines TOLAC  3. Spontaneous labor    Post-op Diagnosis:  1. Same      2. Liveborn male infant   Procedure:  Repeat low-transverse  section with two layer uterine closure via Pfannenstiel incision    Scar excision     Anesthesia: spinal  EBL:  575 mL  Drains: Blake Catheter   Specimens:  Cord blood   Complications: None   Indications:   Ann Lemus is a 23 year old  at 39w4d admitted for early labor. She has a history of CS x1 in [facility] and declined TOLAC on admission.  The risks, benefits, and alternatives of  section were discussed with the patient, and she agreed to proceed. Written consent signed with a .     Findings:   Moderate rectofascial adhesions. Bladder adhesions to the mid-portion of the uterus, otherwise no other intra-abdominal adhesions.   Liveborn male infant in cephalic presentation. Apgars 8 at 1 minute & 9 at 5 minutes. Weight 3885.  Clear amniotic fluid   Normal appearing uterus, left fallopian tube, and ovaries.   Thickened keloid scar     Procedure Details:   The patient was brought to the OR, where adequate spinal anesthesia was administered.  She received Ancef and azithromycin for perioperative prophylaxis. She was placed in the dorsal supine position with a slight leftward tilt. She was prepped and draped in the usual sterile fashion. A surgical time out was performed confirming the patient and planned procedure. An elliptical skin incision was made with the scalpel around the prior thickened keloid scar and the scar was excised. The incision was then carried down to the underlying fascia with sharp and blunt dissection. The fascia was incised in the midline, and the incision was extended bilaterally in an  upward curvilinear fashion. The fascia was dissected off of the underlying rectus muscles with blunt and sharp dissection, both superiorly and inferiorly. The rectus muscles were  in the midline, and the peritoneum was entered bluntly, and the opening was extended with digital pressure. The bladder blade was placed. Due to bladder adhesions to the midportion of the uterus, a bladder flap was made. The vesicouterine peritoneum was incised in the midline, and the incision was extended laterally with the Metzenbaum scissors. A bladder flap was created digitally and the bladder blade was replaced. A transverse hysterotomy was made with the scalpel in the lower uterine segment, and the incision was bluntly extended cephalocaudad with digital pressure. The infant was noted to be in the cephalic position, and was delivered atraumatically. The shoulders delivered easily.  No nuchal cord was noted. The cord was doubly clamped and cut after a 30-60 second delay, and the infant was handed off to the awaiting nursery staff. A segment of cord was obtained for cord gases and routine cord blood obtained. The placenta was delivered with gentle traction on the umbilical cord and uterine massage. The uterus was exteriorized and cleared of all clots and debris. Uterine tone was noted to be firm with pitocin given through the running IV and uterine massage.  The hysterotomy was closed with a running locked suture of 0 Vicryl.  The bladder flap adhesions were further dissected down, then the hysterotomy was imbricated using an 0 Monocryl suture. The hysterotomy was oozing so an additional figure-of-X suture was placed at the right aspect and at the midline. Hemostasis then noted. The posterior cul-de-sac was cleared of all clots and debris. The uterus was returned to the abdomen. The pericolic gutters were cleared of all clots and debris. The hysterotomy was reexamined and noted to be hemostatic. Surgicel powder was placed over  the hysterotomy. The peritoneum was closed with a running 3-0 Vicryl suture. The rectus muscles were re-approximated with a few interrupted sutures using 0 Vicryl. The fascia and rectus muscles were examined and made hemostatic with cautery. The fascia was closed with a running 0 Vicryl suture. The subcutaneous tissue was irrigated and areas of oozing were controlled with electrocautery. The subcutaneous tissue was re-approximated with 3-0 plain gut in a running suture. The skin was closed with 4-0 Monocryl and covered with a sterile pressure dressing.    All sponge, lap, needle, and instrument counts were correct x2. The patient tolerated the procedure well, and was transferred to recovery in stable condition.    Anita Chi MD  Obstetrics, Gynecology and Infertility   8/1/2024, 3:01 AM

## 2024-08-01 NOTE — ANESTHESIA CARE TRANSFER NOTE
Patient: Ann Lemus    Procedure: Procedure(s):   section       Diagnosis: Indication for care in labor or delivery [O75.9]  Diagnosis Additional Information: No value filed.    Anesthesia Type:   Spinal     Note:      Level of Consciousness: awake  Oxygen Supplementation: room air    Independent Airway: airway patency satisfactory and stable  Dentition: dentition unchanged  Vital Signs Stable: post-procedure vital signs reviewed and stable  Report to RN Given: handoff report given  Patient transferred to: Labor and Delivery    Handoff Report: Identifed the Patient, Identified the Reponsible Provider, Reviewed the pertinent medical history, Discussed the surgical course, Reviewed Intra-OP anesthesia mangement and issues during anesthesia, Set expectations for post-procedure period and Allowed opportunity for questions and acknowledgement of understanding  Vitals:  Vitals Value Taken Time   BP     Temp     Pulse     Resp     SpO2         Electronically Signed By: MIRZA Miller CRNA  2024  2:23 AM

## 2024-08-01 NOTE — PROGRESS NOTES
Data: Ann Lemus transferred to [id number] via bed at 0445. Baby transferred via parent's arms.  Action: Receiving unit notified of transfer: Yes. Patient and family notified of room change. Report given to Ct RIVAS at 0500. Belongings sent to receiving unit. Accompanied by Registered Nurse. Oriented patient to surroundings. Call light within reach. Fundus firm +1, with scant bleeding. Blake in place draining well. SCDs on.   Response: Patient tolerated transfer and is stable.

## 2024-08-01 NOTE — PLAN OF CARE
Goal Outcome Evaluation:       Pt. admitted from L&D via bed. Pt. arrived with baby and was accompanied by significant other, Andrew and arrived with personal belongings. Report was taken from Leanna RIVAS in L&D.  Pt. is A&Ox3 and VSS on RA. Fundus is firm and midline.  Vaginal bleeding is scant.   Pt. denied any CP, SOB, lightheadedness, or dizziness, however reports nausea. ROB Burnett administered Reglan upon arrival to [facility]. Patient had small emesis shortly after. PIV patent and infusing.  Blake patent and draining.  Pressure dressing to lower abdomen CDI.  Pneumoboots in place to BLE.  Pt. oriented to the room and call light system.

## 2024-08-01 NOTE — ANESTHESIA PREPROCEDURE EVALUATION
Anesthesia Pre-Procedure Evaluation    Patient: Ann Lemus   MRN: [id number] : 2000        Procedure : Procedure(s):   section          Past Medical History:   Diagnosis Date    History of ectopic pregnancy 10/29/2021    required R salpingectomy due to rupture      Past Surgical History:   Procedure Laterality Date    C/SECTION, LOW TRANSVERSE Bilateral 2018 due to oligohydramnios    LAPAROSCOPIC SALPINGECTOMY Right 2021    d/t ruptured ectopic pregnancy      Allergies   Allergen Reactions    Amoxicillin Rash      Social History     Tobacco Use    Smoking status: Never    Smokeless tobacco: Never   Substance Use Topics    Alcohol use: Not Currently      Wt Readings from Last 1 Encounters:   24 72.6 kg (160 lb)        Anesthesia Evaluation   Pt has had prior anesthetic. Type: Regional.        ROS/MED HX  ENT/Pulmonary:    (-) asthma   Neurologic:       Cardiovascular:    (-) PIH   METS/Exercise Tolerance:     Hematologic:     (+)    no thrombocytopenia,         (-) anemia   Musculoskeletal:       GI/Hepatic:    (-) GERD   Renal/Genitourinary:       Endo:     (+)               Obesity (BMI 34),       Psychiatric/Substance Use:       Infectious Disease:       Malignancy:       Other:      (+)  , ,previous          Physical Exam    Airway        Mallampati: II   TM distance: > 3 FB   Neck ROM: full   Mouth opening: > 3 cm    Respiratory Devices and Support         Dental  no notable dental history         Cardiovascular   cardiovascular exam normal          Pulmonary   pulmonary exam normal                OUTSIDE LABS:  CBC:   Lab Results   Component Value Date    WBC 12.4 (H) 2024    WBC 7.5 2024    HGB 11.8 2024    HGB 11.2 (L) 2024    HCT 33.1 (L) 2024    HCT 32.6 (L) 2024     2024     2024     BMP:   Lab Results   Component Value Date     2024     2023     "POTASSIUM 4.0 05/20/2024    POTASSIUM 3.6 12/02/2023    CHLORIDE 106 05/20/2024    CHLORIDE 104 12/02/2023    CO2 21 (L) 05/20/2024    CO2 25 12/02/2023    BUN 5.9 (L) 05/20/2024    BUN 8.8 12/02/2023    CR 0.40 (L) 05/20/2024    CR 0.52 12/02/2023     (H) 05/20/2024    GLC 93 12/02/2023     COAGS: No results found for: \"PTT\", \"INR\", \"FIBR\"  POC: No results found for: \"BGM\", \"HCG\", \"HCGS\"  HEPATIC:   Lab Results   Component Value Date    ALBUMIN 3.4 (L) 05/20/2024    PROTTOTAL 6.5 05/20/2024    ALT 16 05/20/2024    AST 19 05/20/2024    ALKPHOS 130 05/20/2024    BILITOTAL 0.2 05/20/2024     OTHER:   Lab Results   Component Value Date    MANDA 8.7 05/20/2024       Anesthesia Plan    ASA Status:  2       Anesthesia Type: Spinal.              Consents    Anesthesia Plan(s) and associated risks, benefits, and realistic alternatives discussed. Questions answered and patient/representative(s) expressed understanding.     - Discussed:     - Discussed with:  Patient, Spouse            Postoperative Care            Comments:               Meredith Roman MD    I have reviewed the pertinent notes and labs in the chart from the past 30 days and (re)examined the patient.  Any updates or changes from those notes are reflected in this note.                  "

## 2024-08-01 NOTE — PROVIDER NOTIFICATION
24 [id number]   Provider Notification   Provider Name/Title Dr. Chi   Method of Notification Phone   Request Evaluate - Remote   Notification Reason Patient Arrived;SVE     Dr. Chi returned page. Updated her on but not limited to patient SVE, pain, request for repeat  section. Orders obtained to prep for . Dr. Chi on route to [place] to delivery. Will notify when on way to [facility]. TORB

## 2024-08-01 NOTE — ANESTHESIA PROCEDURE NOTES
"Intrathecal injection Procedure Note    Pre-Procedure   Staff -        Anesthesiologist:  Meredith Roman MD       Performed By: anesthesiologist       Location: OR       Pre-Anesthestic Checklist: patient identified, IV checked, risks and benefits discussed, informed consent, monitors and equipment checked, pre-op evaluation, at physician/surgeon's request and post-op pain management  Timeout:       Correct Patient: Yes        Correct Procedure: Yes        Correct Site: Yes        Correct Position: Yes   Procedure Documentation  Procedure: intrathecal injection       Diagnosis: CS       Patient Position: sitting       Patient Prep/Sterile Barriers: sterile gloves, mask, patient draped       Skin prep: Chloraprep       Insertion Site: L3-4. (midline approach).       Needle Gauge: 25.        Needle Length (Inches): 3.5        Spinal Needle Type: Pencan       Introducer used       Introducer: 20 G       # of attempts: 1 and  # of redirects:  0    Assessment/Narrative         Paresthesias: No.       CSF fluid: clear.    Medication(s) Administered   0.75% Hyperbaric Bupivacaine (Intrathecal) - Intrathecal   1.6 mL - 8/1/2024 1:09:00 AM  Morphine PF 1 mg/mL (Intrathecal) - Intrathecal   0.15 mg - 8/1/2024 1:09:00 AM    FOR [facility] ([facility] ONLY:   Pain Team Contact information: please page the Pain Team Via [employer]. Search \"Pain\". During daytime hours, please page the attending first. At night please page the resident first.      "

## 2024-08-01 NOTE — PROGRESS NOTES
Pt admitted for Repeat C/S.  Admission completed thru  services due to Greek speaking.  Plan of care explained about starting the C/S when Dr Chi is done with another delivery and we will get her prepped for the C/S while we wait.  IV started.  IV fluids given for surgical bolus and to see if the fluids will help slow down the ctxs to help with the pain.  Pt is coping with ctx at this time.  Report given to Hortencia SOTO and care relinquished.

## 2024-08-01 NOTE — DISCHARGE SUMMARY
[facility]   Discharge Summary    Ann Lemus MRN# [id number]   Age: 23 year old YOB: 2000     Date of Admission:  2024  Date of Discharge:  8/3/2024  2:45 PM  Admitting Physician:  Anita Chi MD  Discharge Physician:  Jackie Hernandez MD     Admit Dx:   - Intrauterine pregnancy at 39w4d  - Hx CS x1  - Labor    Discharge Dx:  - Same as above, s/p repeat low transverse  section    Procedures:  - Repeat low transverse  section with  layer uterine closure via Pfannenstiel incision  - Spinal analgesia    Admit HPI:  Ann Lemus is a 23 year old  at 39w4d admitted for early labor. She has a history of CS x1 in [facility] and declined TOLAC on admission.  The risks, benefits, and alternatives of  section were discussed with the patient, and she agreed to proceed. Written consent signed with a .   Please see her admit H&P for full details of her PMH, PSH, Meds, Allergies and exam on admit.    Operative Course:  Surgery was uncomplicated. EBL from the delivery was 575. Please see her  Section Operative Note for full details regarding her delivery.    Operative Findings:   Moderate rectofascial adhesions. Bladder adhesions to the mid-portion of the uterus, otherwise no other intra-abdominal adhesions.   Liveborn male infant in cephalic presentation. Apgars 8 at 1 minute & 9 at 5 minutes. Weight 3885.  Clear amniotic fluid   Normal appearing uterus, left fallopian tube, and ovaries.   Thickened keloid scar     Postoperative Course:  Her postoperative course was uncomplicated . On POD#2, she was meeting all of her postpartum goals and deemed stable for discharge. She was voiding without difficulty, tolerating a regular diet without nausea and vomiting, her pain was well controlled on oral pain medicines and her lochia was appropriate.     Discharge Medications:     Review of your  medicines        START taking        Dose / Directions   ibuprofen 600 MG tablet  Commonly known as: ADVIL/MOTRIN  Used for: Delivery of pregnancy by  section      Dose: 600 mg  Take 1 tablet (600 mg) by mouth every 6 hours as needed for moderate pain Start after delivery  Quantity: 60 tablet  Refills: 0     senna-docusate 8.6-50 MG tablet  Commonly known as: SENOKOT-S/PERICOLACE  Used for: Delivery of pregnancy by  section      Dose: 1 tablet  Take 1 tablet by mouth daily Start after delivery.  Quantity: 100 tablet  Refills: 0            CONTINUE these medicines which may have CHANGED, or have new prescriptions. If we are uncertain of the size of tablets/capsules you have at home, strength may be listed as something that might have changed.        Dose / Directions   * acetaminophen 500 MG tablet  Commonly known as: TYLENOL  This may have changed: Another medication with the same name was added. Make sure you understand how and when to take each.  Used for: Influenza B      Dose: 500-1,000 mg  Take 1-2 tablets (500-1,000 mg) by mouth every 6 hours as needed for mild pain  Quantity: 30 tablet  Refills: 0     * acetaminophen 325 MG tablet  Commonly known as: TYLENOL  This may have changed: You were already taking a medication with the same name, and this prescription was added. Make sure you understand how and when to take each.  Used for: Delivery of pregnancy by  section      Dose: 650 mg  Take 2 tablets (650 mg) by mouth every 6 hours as needed for mild pain Start after Delivery.  Quantity: 100 tablet  Refills: 0           * This list has 2 medication(s) that are the same as other medications prescribed for you. Read the directions carefully, and ask your doctor or other care provider to review them with you.                CONTINUE these medicines which have NOT CHANGED        Dose / Directions   cholecalciferol 125 mcg (5000 units) capsule  Commonly known as: VITAMIN D3  Used for: Vitamin  D deficiency      Dose: 125 mcg  Take 1 capsule (125 mcg) by mouth daily Take one capsule daily.  Quantity: 90 capsule  Refills: 3     doxylamine 25 MG Tabs tablet  Commonly known as: UNISOM  Used for: High-risk pregnancy, unspecified trimester      Dose: 25 mg  Take 1 tablet (25 mg) by mouth at bedtime  Quantity: 30 tablet  Refills: 1     hydrOXYzine jose 25 MG capsule  Commonly known as: VISTARIL  Used for: Encounter for triage in pregnant patient      Dose: 25 mg  Take 1 capsule (25 mg) by mouth 3 times daily as needed for other (Sleep aid)  Quantity: 30 capsule  Refills: 0     prenatal multivitamin w/iron 27-0.8 MG tablet      Dose: 1 tablet  Take 1 tablet by mouth  Refills: 0     pyridOXINE 25 MG tablet  Commonly known as: VITAMIN B6  Used for: High-risk pregnancy, unspecified trimester      Dose: 25 mg  Take 1 tablet (25 mg) by mouth daily  Quantity: 30 tablet  Refills: 1     simethicone 125 MG chewable tablet  Commonly known as: MYLICON  Used for: HRP (high risk pregnancy), first trimester      Dose: 125 mg  Take 1 tablet (125 mg) by mouth 4 times daily as needed for intestinal gas  Quantity: 90 tablet  Refills: 3               Where to get your medicines        These medications were sent [place] [facility] [place] - [address] [address]  [address] [address] [facility] [address]      Phone: [phone]   acetaminophen 325 MG tablet  ibuprofen 600 MG tablet  senna-docusate 8.6-50 MG tablet           Discharge/Disposition:  Ann Lemus was discharged to home in stable condition with the following instructions/medications:  1) Call for temperature > 100.4, bright red vaginal bleeding >1 pad an hour x 2 hours, foul smelling vaginal discharge, pain not controlled by usual oral pain meds, persistent nausea and vomiting not controlled on medications, drainage or redness from incision site    5) Discharge activity:  No heavy lifting >15 lbs or strenuous activity for 6  weeks, pelvic rest for 6 weeks, no driving or operating machinery while on narcotics.    Anita Chi MD  Obstetrics, Gynecology and Infertility

## 2024-08-01 NOTE — H&P
[facility]  OB History and Physical      Ann Lemus MRN# [id number]   Age: 23 year old YOB: 2000     CC:  labor    HPI:  Ms. Ann Lemus is a 23 year old  at 39w4d by LMP c/w 6w4d US, who presents with contractions for 3 days.  She denies vaginal bleeding, and loss of fluid.   + normal fetal movement.  Seen at [facility] yesterday with closed cervix, today /-.     Her prenatal care has primarily been with [facility]. Records reviewed through [employer].     Patient seen with ipad      Pregnancy Complications:  Hx CS x1 in [facility]   Hx ectopic pregnancy s/p salpingectomy   Hep B NI     Prenatal Labs:   Lab Results   Component Value Date    AS Negative 2024    HEPBANG Nonreactive 2023    CHPCRT Negative 2023    GCPCRT Negative 2023    HGB 11.8 2024       GBS Status:   Lab Results   Component Value Date    GBS Negative 2024           OB History  OB History    Para Term  AB Living   4 2 2 0 2 2   SAB IAB Ectopic Multiple Live Births   1 0 1 0 2      # Outcome Date GA Lbr Mauricio/2nd Weight Sex Type Anes PTL Lv   4 Term 24 39w4d  3.885 kg (8 lb 9 oz) M CS-LTranv Spinal N ALEJANDRO      Name: Male-Ann Lemus      Apgar1: 8  Apgar5: 9   3 SAB      SAB      2 Ectopic 21     ECTOPIC   FD      Birth Comments: s/p right salpingectomy with removal of ruptured ectopic pregnancy   1 Term 18    M CS-LTranv EPI  ALEJANDRO      Birth Comments: In [facility]      Name: Alexandr Monet       PMHx:   Past Medical History:   Diagnosis Date    History of ectopic pregnancy 10/29/2021    required R salpingectomy due to rupture     PSHx:   Past Surgical History:   Procedure Laterality Date    C/SECTION, LOW TRANSVERSE Bilateral 2018 due to oligohydramnios    LAPAROSCOPIC SALPINGECTOMY Right 2021    d/t ruptured ectopic pregnancy     Meds:   Medications Prior to  "Admission   Medication Sig Dispense Refill Last Dose    acetaminophen (TYLENOL) 500 MG tablet Take 1-2 tablets (500-1,000 mg) by mouth every 6 hours as needed for mild pain 30 tablet 0     cholecalciferol (VITAMIN D3) 125 mcg (5000 units) capsule Take 1 capsule (125 mcg) by mouth daily Take one capsule daily. 90 capsule 3     doxylamine (UNISOM) 25 MG TABS tablet Take 1 tablet (25 mg) by mouth at bedtime 30 tablet 1     hydrOXYzine jose (VISTARIL) 25 MG capsule Take 1 capsule (25 mg) by mouth 3 times daily as needed for other (Sleep aid) 30 capsule 0     Prenatal Vit-Fe Fumarate-FA (PRENATAL MULTIVITAMIN W/IRON) 27-0.8 MG tablet Take 1 tablet by mouth       pyridOXINE (VITAMIN B6) 25 MG tablet Take 1 tablet (25 mg) by mouth daily 30 tablet 1     simethicone (MYLICON) 125 MG chewable tablet Take 1 tablet (125 mg) by mouth 4 times daily as needed for intestinal gas 90 tablet 3      Allergies:    Allergies   Allergen Reactions    Amoxicillin Rash      FmHx:   Family History   Problem Relation Age of Onset    No Known Problems Mother     No Known Problems Father     No Known Problems Maternal Grandmother     No Known Problems Maternal Grandfather     No Known Problems Paternal Grandmother     No Known Problems Paternal Grandfather     No Known Problems Brother     No Known Problems Sister     No Known Problems Sister     No Known Problems Sister     No Known Problems Son      SocHx: She denies any tobacco, alcohol, or other drug use during this pregnancy.    ROS:   Complete 10-point ROS negative except as noted in HPI.  PE:  Vit: Patient Vitals for the past 4 hrs:   BP Temp Temp src Pulse Resp SpO2 Height Weight   08/01/24 0245 99/66 -- -- 77 14 95 % -- --   08/01/24 0222 119/65 97.6  F (36.4  C) Temporal 82 19 97 % -- --   07/31/24 2304 125/77 98.5  F (36.9  C) Temporal -- 18 -- 1.448 m (4' 9\") 72.6 kg (160 lb)      Gen: Uncomfortable with contractions  CV: rr  Pulm: Nonlabored breathing  Abd: Soft, gravid, " non-tender  Ext: trace LE edema b/l  Cx: /-1 per RN     Pres:  ceph     FHT: Baseline 140, mod variability, + accelerations, no decelerations   Crivitz: 3-4 contractions in 10 minutes      Assessment/Plan   Ms. Ann Lemus is a 23 year old , at 39w4d, who presents with early labor. She is primarily a [facility] patient but presented to [facility] as an unassigned patient.     Per chart review, had been planning TOLAC but on admission today she is requesting repeat  delivery.   R/b discussed including infection, bleeding, injury to surrounding structures. Written consent signed with . Amenable to transfusion if needed.     Ancef/azithromycin.   To OR, anesthesia aware       Anita Chi MD   Obstetrics, Gynecology and Infertility   2024 2:56 AM

## 2024-08-02 PROCEDURE — 120N000012 HC R&B POSTPARTUM

## 2024-08-02 PROCEDURE — 250N000013 HC RX MED GY IP 250 OP 250 PS 637: Performed by: STUDENT IN AN ORGANIZED HEALTH CARE EDUCATION/TRAINING PROGRAM

## 2024-08-02 RX ORDER — IBUPROFEN 600 MG/1
600 TABLET, FILM COATED ORAL EVERY 6 HOURS PRN
Qty: 60 TABLET | Refills: 0 | Status: SHIPPED | OUTPATIENT
Start: 2024-08-02

## 2024-08-02 RX ORDER — ACETAMINOPHEN 325 MG/1
650 TABLET ORAL EVERY 6 HOURS PRN
Qty: 100 TABLET | Refills: 0 | Status: SHIPPED | OUTPATIENT
Start: 2024-08-02

## 2024-08-02 RX ORDER — AMOXICILLIN 250 MG
1 CAPSULE ORAL DAILY
Qty: 100 TABLET | Refills: 0 | Status: SHIPPED | OUTPATIENT
Start: 2024-08-02

## 2024-08-02 RX ADMIN — ACETAMINOPHEN 975 MG: 325 TABLET, FILM COATED ORAL at 15:57

## 2024-08-02 RX ADMIN — IBUPROFEN 800 MG: 400 TABLET, FILM COATED ORAL at 22:10

## 2024-08-02 RX ADMIN — IBUPROFEN 800 MG: 400 TABLET, FILM COATED ORAL at 04:12

## 2024-08-02 RX ADMIN — IBUPROFEN 800 MG: 400 TABLET, FILM COATED ORAL at 15:57

## 2024-08-02 RX ADMIN — IBUPROFEN 800 MG: 400 TABLET, FILM COATED ORAL at 10:06

## 2024-08-02 RX ADMIN — SENNOSIDES AND DOCUSATE SODIUM 2 TABLET: 50; 8.6 TABLET ORAL at 21:14

## 2024-08-02 RX ADMIN — ACETAMINOPHEN 975 MG: 325 TABLET, FILM COATED ORAL at 10:06

## 2024-08-02 RX ADMIN — ACETAMINOPHEN 975 MG: 325 TABLET, FILM COATED ORAL at 04:12

## 2024-08-02 RX ADMIN — ACETAMINOPHEN 975 MG: 325 TABLET, FILM COATED ORAL at 22:10

## 2024-08-02 ASSESSMENT — ACTIVITIES OF DAILY LIVING (ADL)
ADLS_ACUITY_SCORE: 18

## 2024-08-02 NOTE — PLAN OF CARE
Goal Outcome Evaluation:      Plan of Care Reviewed With: patient    Overall Patient Progress: improvingOverall Patient Progress: improving    Vital signs stable. Postpartum assessment WDL, fundus firm at U with scant flow. Incision clean, dry, and intact. Pain controlled with scheduled tylenol and ibuprofen. Patient ambulating and voiding independently. Patient reports passing gas. Breastfeeding on cue with no assistance from staff, every 2-3 hours. Patient and infant bonding well.

## 2024-08-02 NOTE — PROGRESS NOTES
"OB/GYN Postpartum Note    S: Feels well. Pain controlled on meds. Bleeding moderate. Ambulating without dizziness. S/p Blake, voiding spontaneously. Tolerating regular diet without nausea or vomiting. Denies chest pain, shortness of breath, headaches, visual changes, swelling.       O:   Vitals:    24 1541 24 1959 24 0035 24 0731   BP: 133/88 119/79 101/55 107/68   BP Location:    Right arm   Patient Position:    Semi-Armenta's   Cuff Size:    Adult Regular   Pulse: 67 67 61 56   Resp: 16 16 16 16   Temp: 97.6  F (36.4  C) 97.8  F (36.6  C) 98  F (36.7  C) 97.5  F (36.4  C)   TempSrc: Axillary   Oral   SpO2: 98% 100% 99%    Weight:       Height:         General: appears well, in NAD  CV: regular rate  Resp: nonlabored breathing  Abdomen: soft, nontender, nondistended  Fundus firm at umbilicus  Incision: dressing in place   Extremities: trace edema in BLE    Labs/Rh:  O POS  No components found for: \"HEMOGLOBIN\"    A: Ms. Ann Lemus is a 23 year old  POD #1 s/p RLTCS. Doing well.     P:  Routine post-operative cares  Acute blood loss anemia - considered but ruled out with Hgb 10.7. Continue PNV with iron  Breastfeeding - lactation consult prn   Bowel regimen, encourage ambulation   Rh pos, rubella immune - no MMR/Rhogam indicated     Remove incision dressing in shower today     Disposition: Discharge home tomorrow. Rx done. Plans to follow up with [facility] rather than [facility] due to location - clinic number in discharge paperwork       Anita Chi MD  Obstetrics, Gynecology and Infertility   2024    "

## 2024-08-02 NOTE — PLAN OF CARE
Goal Outcome Evaluation:      Plan of Care Reviewed With: patient, spouse      Vitally stable. Nausea resolved. Tolerating food and meds. Blake removed. Voided x1. Up independently. Breastfeeding well. Supplementing at times with formula via bottle. Pressure dressing intact on incision. Attentive to infant needs. Supportive spouse at bedside.

## 2024-08-03 VITALS
HEART RATE: 57 BPM | HEIGHT: 57 IN | TEMPERATURE: 98 F | SYSTOLIC BLOOD PRESSURE: 118 MMHG | RESPIRATION RATE: 16 BRPM | BODY MASS INDEX: 34.34 KG/M2 | OXYGEN SATURATION: 99 % | DIASTOLIC BLOOD PRESSURE: 63 MMHG | WEIGHT: 159.17 LBS

## 2024-08-03 PROCEDURE — 250N000013 HC RX MED GY IP 250 OP 250 PS 637: Performed by: STUDENT IN AN ORGANIZED HEALTH CARE EDUCATION/TRAINING PROGRAM

## 2024-08-03 RX ADMIN — IBUPROFEN 800 MG: 400 TABLET, FILM COATED ORAL at 10:20

## 2024-08-03 RX ADMIN — SENNOSIDES AND DOCUSATE SODIUM 2 TABLET: 50; 8.6 TABLET ORAL at 10:20

## 2024-08-03 RX ADMIN — ACETAMINOPHEN 975 MG: 325 TABLET, FILM COATED ORAL at 04:21

## 2024-08-03 RX ADMIN — ACETAMINOPHEN 975 MG: 325 TABLET, FILM COATED ORAL at 10:19

## 2024-08-03 RX ADMIN — IBUPROFEN 800 MG: 400 TABLET, FILM COATED ORAL at 04:21

## 2024-08-03 ASSESSMENT — ACTIVITIES OF DAILY LIVING (ADL)
ADLS_ACUITY_SCORE: 18

## 2024-08-03 NOTE — PROGRESS NOTES
"OB/GYN Postpartum Note    S: Feels well. Pain controlled on meds. Bleeding moderate. Ambulating without dizziness. S/p Blake, voiding spontaneously. Tolerating regular diet without nausea or vomiting. Denies chest pain, shortness of breath, headaches, visual changes, swelling. Feels ready to go home this morning.      O:   Vitals:    24 0731 24 1555 24 0035 24 0749   BP: 107/68 108/55 120/56 118/63   BP Location: Right arm Right arm  Right arm   Patient Position: Semi-Armenta's Semi-Armenta's  Semi-Armenta's   Cuff Size: Adult Regular Adult Regular  Adult Regular   Pulse: 56 65 56 57   Resp: 16 16 16 16   Temp: 97.5  F (36.4  C) 98.6  F (37  C) 98  F (36.7  C) 98  F (36.7  C)   TempSrc: Oral Oral Oral Oral   SpO2:       Weight:       Height:         General: appears well, in NAD  CV: regular rate  Resp: nonlabored breathing  Abdomen: soft, nontender, nondistended  Fundus firm at umbilicus  Incision: dressing in place   Extremities: trace edema in BLE    Labs/Rh:  O POS  No components found for: \"HEMOGLOBIN\"    A: Ms. Ann Lemus is a 23 year old  POD #2 s/p RLTCS. Doing well.     P:  Routine post-operative cares  Acute blood loss anemia - continue PNV with iron  Breastfeeding - lactation consult prn   Bowel regimen, encourage ambulation   Rh pos, rubella immune - no MMR/Rhogam indicated     Remove incision dressing in shower today     Disposition: Discharge home today. Rx done. Plans to follow up with [facility] rather than [place] due to location - clinic number in discharge paperwork     Jackie Hernandez MD  OB/GYN & Infertility  Pager [phone]  24      "

## 2024-08-03 NOTE — PLAN OF CARE
Goal Outcome Evaluation:      Plan of Care Reviewed With: patient    Overall Patient Progress: improvingOverall Patient Progress: improving    D: VSS, assessments WDL. Fundus firm at 1 below U with scant flow.   I: Pt. received complete discharge paperwork and home medications as filled by discharge pharmacy.  Pt. was given times of last dose for all discharge medications in writing on discharge medication sheets.  Discharge teaching included home medication, pain management, activity restrictions, postpartum cares, and signs and symptoms of infection.    A: Discharge outcomes on care plan met.  Mother states understanding and comfort with self and baby cares.  P: Pt. discharged to home.  Pt. was discharged with baby, and bands were checked at time of discharge.  Pt. was accompanied by , nurse and baby, and left with personal belongings.  Pt. to follow up with OB per MD order.  Pt. had no further questions at the time of discharge and no unmet needs were identified.

## 2024-08-03 NOTE — PROGRESS NOTES
RN walked into room upon when receiving report from previous shift nurse and baby was laying on the bed near the edge while mom and dad were sleeping. RN placed infant back in bassinet and re-educated parents with the safe sleep practices of having infant sleep on back and in bassinet. Plan of care ongoing.

## 2024-08-03 NOTE — DISCHARGE INSTRUCTIONS
"El período posparto: Instrucciones de cuidado-Instrucciones de cuidado  Postpartum: Care Instructions  Instrucciones de cuidado  Después del parto (período posparto), harley cuerpo pasa por muchos cambios. Algunos de estos cambios suceden belinda varias semanas. Belinda las horas posteriores al parto, el cuerpo comienza a recuperarse y se prepara para el amamantamiento. Es posible que belinda marcio tiempo se sienta sensible. Las hormonas pueden cambiar harley estado de ánimo sin advertencia y sin motivo aparente.  Belinda las primeras semanas después del parto, muchas mujeres tienen emociones que cambian de melendez a andres. Es posible que le resulte difícil dormir. Es posible que llore mucho. [place] se llama \"melancolía de la maternidad\". Estas emociones abrumadoras suelen desaparecer dentro de unos días o semanas. Raimundo es importante hablar con harley médico acerca de rodriguez sentimientos.  Belinda las primeras semanas después del parto, es fácil cansarse demasiado o sentirse abrumada. No bernard demasiados esfuerzos. Descanse cada vez que pueda, acepte la ayuda de los demás, coma pam y sissy abundantes líquidos.  En el primer par de semanas después de pino a corky, es posible que harley médico o partera deseen verla y hacer un plan para cualquier atención de seguimiento que usted pueda necesitar. Probablemente tendrá cornelia ashok completa de posparto dentro de los primeros 3 meses después del parto. En rony momento, harley médico o partera revisarán harley recuperación del parto. Él o norma también verán cómo está enfrentando usted rodriguez emociones y conversarán sobre rodriguez inquietudes y preguntas.  La atención de seguimiento es cornelia parte clave de harley tratamiento y seguridad. Asegúrese de hacer y acudir a todas las citas, y llame a harley médico si está teniendo problemas. También es cornelia buena idea saber los resultados de rodriguez exámenes y mantener cornelia lista de los medicamentos que cecilia.   Cómo puede cuidarse en el hogar?  Duerma o descanse cuando harley bebé lo bernard.  Si es " posible, permita que maura familiares o maura amigos la ayuden con las tareas del hogar. No intente hacerlo todo usted anabella.  Si tiene hemorroides o hinchazón o dolor alrededor de la abertura de la vagina, pruebe aplicarse frío y calor. Puede aplicarse hielo o cornelia compresa fría en la emily belinda 10 a 20 minutos cada vez. Póngase un paño martinez entre el hielo y la piel. Además, trate de sentarse en algunos centímetros de agua tibia (baño de asiento) 3 veces al día y después de las evacuaciones.  [place] los analgésicos (medicamentos para el dolor) exactamente según las indicaciones.  Si el médico le recetó un analgésico, tómelo según las indicaciones.  Si no está tomando un analgésico recetado, pregúntele a harley médico si puede nisha mateo de venta wayne.  Coma más fibra para evitar el estreñimiento. Incluya alimentos yvonne panes y cereales integrales, verduras crudas, frutas crudas y secas, y frijoles (habichuelas).  Marianna mucho líquido. Si tiene cornelia enfermedad renal, cardíaca o hepática y tiene que restringir los líquidos, hable con harley médico antes de aumentar la cantidad de líquido que cristin.  No se lave el interior de la vagina con líquidos (lavados vaginales).  Si tiene puntos de sutura, mantenga la emily limpia con agua tibia, ya sea echándola o rociándola sobre la emily externa de la vagina y el ano después de usar el baño.  Lleve cornelia lista de preguntas para hacerle a harley médico o partera. Maura preguntas podrían ser acerca de lo siguiente:  Cambios en los senos, yvonne bultos o sensibilidad.  Cuándo esperar que regrese harley período menstrual.  Qué forma de anticoncepción es la más adecuada para usted.  El peso que aumentó belinda el embarazo.  Las opciones de ejercicio.  Qué alimentos y bebidas son mejores para usted, sobre todo si está amamantando.  Problemas que podría tener con la lactancia.  Cuándo puede tener relaciones sexuales. Algunas mujeres clemencia vez quieran hablar sobre lubricantes vaginales.  Cualquier sentimiento de  tristeza o inquietud que tenga.   Cuándo debe pedir ayuda?  Comparta esta información con harley akin, harley loren o cornelia amiga. Pueden ayudarla a prestar atención a las señales de advertencia.  Llame al 911  en cualquier momento que considere que necesita atención de urgencia. Por ejemplo, llame si:    Tiene pensamientos de lastimarse o de hacerle daño a harley bebé o a otra persona.     Se desmayó (perdió el conocimiento).     Tiene dolor en el pecho, le falta el aire o tose olivia.     Tiene convulsiones.   Dónde obtener ayuda las 24 horas del día, los 7 días de la semana   Si usted o alguien que conoce habla de suicidio, autolesionarse, cornelia crisis de gretchen mental, cornelia crisis por consumo de sustancias o cualquier otro tipo de malestar psíquico, obtenga ayuda de inmediato. Usted puede:    Marcar 988 para llamar a la línea de prevención del suicidio y crisis.     Llamar al [phone] ([phone]).     Enviar un mensaje de texto que diga HOME al [id number] para acceder a la línea de mensajes de texto en casos de crisis.   Considere guardar estos números en harley teléfono.  Visite [employer] para obtener más información o conversar en línea.  Llame a harley médico ahora mismo o busque atención médica de inmediato si:    Tiene señales de hemorragia (sangrado excesivo), yvonne:  Sangrado vaginal intenso. D'Hanis significa que está empapando cornelia o más toallas sanitarias en cornelia hora. O expulsa coágulos de olivia más grandes que un huevo.  Se siente mareada o aturdida, o siente yvonne si se fuera a desmayar.  Se siente tan cansada o débil que no puede hacer rodriguez actividades habituales.  Latidos cardíacos acelerados o irregulares.  Dolor abdominal nuevo o más intenso.     Tiene señales de infección, tales yvonne:  Fiebre.  Micción frecuente o dolorosa o olivia en la orina.  Secreción vaginal con olor desagradable.  Dolor abdominal nuevo o más intenso.     Tiene síntomas de un coágulo de olivia en la pierna (llamado trombosis venosa  "profunda), tales yvonne:  Dolor en la pantorrilla, la parte posterior de la rodilla, el muslo o la barbie.  Hinchazón en la pierna o la barbie.  Un cambio de color en la pierna o la barbie. La piel podría estar enrojecida o morada, según cuál sea harley color de piel normal.     Tiene señales de preeclampsia, tales yvonne:  Hinchazón repentina de la faina, las stuart o los pies.  Nuevos problemas de la vista (yvonne oscurecimiento, riana borroso o riana puntos).  Dolor de israel intenso.     Tiene señales de insuficiencia cardíaca, tales yvonne:  Nueva o mayor falta de aire.  Nueva o mayor hinchazón en las piernas, los tobillos o los pies.  Aumento repentino de peso, yvonne más de 2 o 3 libras (0.9 kg o 1.4 kg) en un día o 5 libras (2.3 kg) en cornelia semana.  Se siente tan cansada o débil que no puede hacer rodriguez actividades habituales.     Se sometió a un alivio del dolor raquídeo o epidural y tiene:  Dolor de espalda nuevo o peor.  Aumento del dolor, la hinchazón, la temperatura o el enrojecimiento en el lugar de la inyección.  Hormigueo, debilidad o entumecimiento en las piernas o la barbie.   Preste especial atención a los cambios en harley gretchen y asegúrese de comunicarse con harley médico si:    El sangrado vaginal no disminuye.     Siente tristeza, ansiedad o desesperanza belinda más de algunos días.     Tiene problemas con los senos o el amamantamiento.    Dónde puede encontrar más información en inglés?  Vaya a [email or web address].[email or web address]  Escriba Z768 en la búsqueda para aprender más acerca de \"El período posparto: Instrucciones de cuidado-Instrucciones de cuidado.\"  Revisado: 10 tom, 2023               Versión del contenido: 14.0    2025-5608 Healthwise, [place].   Las instrucciones de cuidado fueron adaptadas bajo licencia por harley profesional de atención médica. Si usted tiene preguntas sobre cornelia afección médica o sobre estas instrucciones, siempre pregunte a harley profesional de gretchen. Zevia, [place] niega " toda garantía o responsabilidad por harley uso de esta información.    Warning Signs after Having a Baby    Keep this paper on your fridge or somewhere else where you can see it.    Call your provider if you have any of these symptoms up to 12 weeks after having your baby.    Thoughts of hurting yourself or your baby  Pain in your chest or trouble breathing  Severe headache not helped by pain medicine  Eyesight concerns (blurry vision, seeing spots or flashes of light, other changes to eyesight)  Fainting, shaking or other signs of a seizure    Call 9-1-1 if you feel that it is an emergency.     The symptoms below can happen to anyone after giving birth. They can be very serious. Call your provider if you have any of these warning signs.    My provider s phone number: _______________________    Losing too much blood (hemorrhage)    Call your provider if you soak through a pad in less than an hour or pass blood clots bigger than a golf ball. These may be signs that you are bleeding too much.    Blood clots in the legs or lungs    After you give birth, your body naturally clots its blood to help prevent blood loss. Sometimes this increased clotting can happen in other areas of the body, like the legs or lungs. This can block your blood flow and be very dangerous.     Call your provider if you:  Have a red, swollen spot on the back of your leg that is warm or painful when you touch it.   Are coughing up blood.     Infection    Call your provider if you have any of these symptoms:  Fever of 100.4 F (38 C) or higher.  Pain or redness around your stitches if you had an incision.   Any yellow, white, or green fluid coming from places where you had stitches or surgery.    Mood Problems (postpartum depression)    Many people feel sad or have mood changes after having a baby. But for some people, these mood swings are worse.     Call your provider right away if you feel so anxious or nervous that you can't care for yourself or  your baby.    Preeclampsia (high blood pressure)    Even if you didn't have high blood pressure when you were pregnant, you are at risk for the high blood pressure disease called preeclampsia. This risk can last up to 12 weeks after giving birth.     Call your provider if you have:   Pain on your right side under your rib cage  Sudden swelling in the hands and face    Remember: You know your body. If something doesn't feel right, get medical help.     For informational purposes only. Not to replace the advice of your health care provider. Copyright 2020 [facility]. All rights reserved. Clinically reviewed by Inge Morales, RNC-OB, MSN. [employer] [id number] - Rev 02/23.

## 2024-08-03 NOTE — PROGRESS NOTES
RN walked into room and found infant propped upright on bed while parent's were watching infant from the couch.  Rn placed infant on back in bassinet and re-educated parent's on the importance of having infant in bassinet or held when keeping infant upright.  Plan of care ongoing.

## 2024-08-03 NOTE — PLAN OF CARE
Goal Outcome Evaluation:      Plan of Care Reviewed With: patient    Overall Patient Progress: improvingOverall Patient Progress: improving    Vital signs stable. Postpartum assessment WDL. Incision UTV, no drainage. Pain controlled with tylenol and ibuprofen. Patient up ad yari and voiding without difficulty. Patient reports passing gas. Patient tolerating regular diet. Breastfeeding on cue with no staff assist. Patient and infant bonding well. Plan of care ongoing.

## 2024-08-06 ENCOUNTER — PATIENT OUTREACH (OUTPATIENT)
Dept: CARE COORDINATION | Facility: CLINIC | Age: 24
End: 2024-08-06
Payer: MEDICAID

## 2024-08-06 NOTE — PROGRESS NOTES
"  [facility]: Transitions of Care Outreach  Chief Complaint   Patient presents with    Clinic Care Coordination - Post Hospital       Most Recent Admission Date: 2024   Most Recent Admission Diagnosis:      Most Recent Discharge Date: 8/3/2024   Most Recent Discharge Diagnosis: Delivery of pregnancy by  section - O82     Transitions of Care Assessment    Discharge Assessment  How are you doing now that you are home?: \" I am doing ok \"  How are your symptoms? (Red Flag symptoms escalate to triage hotline per guidelines): Improved  Do you know how to contact your clinic care team if you have future questions or changes to your health status? : Yes  Does the patient have their discharge instructions? : Yes  Does the patient have questions regarding their discharge instructions? : No  Were you started on any new medications or were there changes to any of your previous medications? : Yes  Does the patient have all of their medications?: Yes  Do you have questions regarding any of your medications? : No  Do you have all of your needed medical supplies or equipment (DME)?  (i.e. oxygen tank, CPAP, cane, etc.): Yes    Post-op ([facility] Only)  If the patient had a surgery or procedure, do they have any questions for a nurse?: No             Follow up Plan     Discharge Follow-Up  Discharge follow up appointment scheduled in alignment with recommended follow up timeframe or Transitions of Risk Category? (Low = within 30 days; Moderate= within 14 days; High= within 7 days): Yes  Discharge Follow Up Appointment Date: 24  Discharge Follow Up Appointment Scheduled with?: Primary Care Provider    No future appointments.    Outpatient Plan as outlined on AVS reviewed with patient.    For any urgent concerns, please contact our 24 hour nurse triage line: [phone] ([phone])       BETY Limon                "

## 2024-09-12 ENCOUNTER — TELEPHONE (OUTPATIENT)
Dept: CONSULT | Facility: CLINIC | Age: 24
End: 2024-09-12
Payer: COMMERCIAL

## 2024-09-24 ENCOUNTER — OFFICE VISIT (OUTPATIENT)
Dept: CONSULT | Facility: CLINIC | Age: 24
End: 2024-09-24
Attending: GENETIC COUNSELOR, MS
Payer: COMMERCIAL

## 2024-09-24 DIAGNOSIS — R01.1 HEART MURMUR: ICD-10-CM

## 2024-09-24 DIAGNOSIS — Z82.49 FAMILY HISTORY OF HYPERTROPHIC CARDIOMYOPATHY: Primary | ICD-10-CM

## 2024-09-24 PROCEDURE — 96040 HC GENETIC COUNSELING, EACH 30 MINUTES: CPT | Performed by: GENETIC COUNSELOR, MS

## 2024-09-24 PROCEDURE — 36415 COLL VENOUS BLD VENIPUNCTURE: CPT | Performed by: GENETIC COUNSELOR, MS

## 2024-09-24 PROCEDURE — 999N000069 HC STATISTIC GENETIC COUNSELING, < 16 MIN: Performed by: GENETIC COUNSELOR, MS

## 2024-09-24 NOTE — PROGRESS NOTES
"Name:  Ann Brown \"Ann\"  :   2000  MRN:   [id number]  Date of service: Sep 24, 2024  Primary Provider: [place], [facility]  Referring Provider: No ref. provider found    PRESENTING INFORMATION   Reason for consultation:  A consultation in the [facility] was requested for Ann, a 23 year old female, for evaluation of The primary encounter diagnosis was Family history of hypertrophic cardiomyopathy. A diagnosis of Heart murmur was also pertinent to this visit.     Ann was accompanied to this visit by her  and sons. A  facilitated today's visit.    History is obtained from Patient, electronic health record, and . I met with Ann to review her son's results and to obtain informed consent for genetic testing for herself.      ASSESSMENT & PLAN  Ann is a 23 year old-year old female with a history of a heart murmur. She has not had cardiac imaging. Family history is significant for son with severe septal hypertrophy diagnosed at birth. His genetic testing recently identified two variants in MYBPC3 (one pathogenic and one uncertain, phasing unknown). He also had one uncertain variant in ACTN2.     We reviewed that John's genetic testing was positive for MYBPC3-related HCM. Most people with MYBPC3-HCM have a later onset, but people with multiple cardiomyopathy variants can have an earlier onset. We therefore suspect that at least one of the other uncertain variants is also pathogenic. Genetic testing can be coordinated for Ann and Mikhail. This is medically necessary as the presence of the pathogenic variant would necessitate ongoing cardiomyopathy screening. Early screening and intervention can prevent heart failure and dangerous arrhythmias. It can also inform the risks to siblings and other relatives, improving our ability to perform cascade genetic testing. We will also test nAn and Mikhail for the uncertain " variants to aid in interpretation of these variants. Genetic testing today was offered: MYBPC3 and ACTN2 variant testing. Ann provided informed consent for the testing.     blood sample will be collected and sent to [facility] for MYBPC3 (2 variants) and ACTN2 (1 variant) targeted tests  Orders Placed This Encounter   Procedures    Other Laboratory; [facility]; MYBPC3 and ACTN2 targeted tests (TF68). DO NOT BILL PATIENT (Laboratory Miscellaneous Order)    Other Laboratory; [facility]; MYBPC3 Known Familial Variant (9011) DO NOT BILL PATIENT (Laboratory Miscellaneous Order)     Cost of testing is expected to be $0 out-of-pocket due to Medicaid plan  After testing is initiated, results will be returned by phone in 4-6 weeks. Permission to share results with Mikhail. Follow-up dependent on results  Genetic testing for dad is available. We had the financial counselors reach out to him to help him get insurance coverage or logan care  Brother, Edgardo, was scheduled in Cardiogenetics clinic with myself and Dr. Padron 10/22/24.  Half brothers live in [facility]. They can see a local cardiologist and genetic counselor, if possible. I will write a letter to their parents and have it translated to Italian.  Contact information was provided should any questions arise in the future.       HPI:  Ann is a 23 year old-year old female with a family history of hypertrophic cardiomyopathy in her infant son, John. He was found to have one pathogenic variant in MYBPC3. He also had two variants of uncertain significance in MYBPC3 and ACTN2. I met with Ann today to review his results and offer genetic testing.    Ann reports a heart murmur in infancy. Her parents were told it would self-resolve. She has never had cardiac imaging. She reports that providers have listened to her heart since and have not heard a murmur. She denied cardiac symptoms.    Her goals today are to better understand the genetic test results. They are  concerned about risks to relatives/themselves.    Patient Active Problem List   Diagnosis    History of Ectopic pregnancy    Immigrant with language difficulty    Does not have health insurance    High-risk pregnancy, unspecified trimester    Hx of  section    Vitamin D deficiency    Immunization due Hep B non-immune    Headache    Encounter for triage in pregnant patient    H/O unilateral salpingectomy    History of  delivery, currently pregnant    Delivery of pregnancy by  section       Pertinent studies/abnormal test results:   No history of genetic testing    Imaging results:   none  No results found for this or any previous visit (from the past 744 hour(s)).  No results found for any visits on 24.  No results found for this or any previous visit (from the past 8760 hour(s)).    Past Medical History:  Past Medical History:   Diagnosis Date    History of ectopic pregnancy 10/29/2021    required R salpingectomy due to rupture       Past Surgical History:  Past Surgical History:   Procedure Laterality Date    C/SECTION, LOW TRANSVERSE Bilateral 2018 due to oligohydramnios     SECTION N/A 2024    Procedure:  section;  Surgeon: Anita Chi MD;  Location:  [facility]    LAPAROSCOPIC SALPINGECTOMY Right 2021    d/t ruptured ectopic pregnancy        FAMILY HISTORY  A three generation pedigree was obtained today and scanned into the EMR. The following information is significant:     7 week old sone, John, severe septal hypertrophy identified at birth. MYBPC3 variants (2) and ACTN2 variant (see below)   6yo son who is well. No echo. Lives with family  Father: well  Mother: well  Siblings: well  Nieces/nephews: well     The family history is otherwise negative for cardiomyopathy, arrhythmia, sudden death, HF, ataxia, weakness, seizure, endocrinopathy, HL, VL, DD, ID, birth defects, reduced growth birth defects, sudden death, and known genetic disorders.  Consanguinity is denied.    SOCIAL HISTORY  Lives with  and two children.     DISCUSSION  Genetics  Today we reviewed that our genetic material or DNA is responsible for how our bodies grow and develop. It can be thought of as an instruction manual. This instruction manual is made up of chapters called genes. Our genes are inherited on structures called chromosomes, of which we have 23 pairs for a total of 46. For each chromosome pair, one copy is inherited from the mother and one is inherited from the father. The chromosome pairs are numbered from 1 to 22, and the 23rd pair of chromsomes is called the sex chromsomes. These determine if we are a male or female.     Changes in the chromosomes or in the DNA sequence of a gene can cause the signs and symptoms of a genetic condition because the instructions it is providing to the body have been altered. This can be a small spelling error in the gene, a large duplicated piece of information, or a large missing piece of information.     John's Genetic Test Results  Next Generation Sequencing (NGS) for cardiomyopathy was completed at [facility]. 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 He also had a variant of uncertain significance in MYBPC3. Biallelic variants in MYBPC3 can cause a  hypertrophic cardiomyopathy, so we are suspicious that the uncertain variant is also pathogenic.     John had a variant of uncertain significance in ACTN2. It is unclear if this variant is pathogenic or benign due to the limited information available about the variant. Pathogenic ACTN2 variants can cause hypertrophic cardiomyopathy, so we cannot exclude that this variant may be related to John's cardiomyopathy.      We reviewed  the concepts of genetics. We reviewed that these cardiac genes are like recipes for the heart muscle that help to create and maintain it. John has spelling changes in these heart genes, which cause the heart muscle to be made/maintained abnormally. These sorts of genetics changes are a common cause of cardiomyopathy. John was born with these genetic changes.  They may have been inherited from one or both parents. Genetic testing for his parents is therefore available. His full brother may have these variants as well, so we have coordinated a visit for him in cardiogenetics clinic for genetic testing and echo/EKG. His paternal half brothers do not live with the family. They can see a local cardiologist and genetic counselor. We will write a family letter for them to share with their mothers.    Genetic Testing  MYBPC3 and ACTN2 genetic testing is available to Ann to look for all three variants. This can help us with her care (cardiomyopathy risk and screening) as well as her children's care (variant phasing). The potential results were discussed including a positive, negative, or variant of uncertain significance.     Management and surveillance of Ann will depend on the genetic test results. It can also help predict the recurrence risk for Ann and other family members to have another child with similar healthcare needs.    The lab will perform MYBPC3 VUS and ACTN2 testing free of charge. MYBPC3 pathogenic variant testing is charged (through insurance or $99 self pay). We expect the cost to be $0 out-of-pocket to her through insurance.     Lab results may be automatically released via [employer].  Department protocol is to hold genetic testing results until we have reviewed them. We will then contact the patient directly to disclose the results and ensure they receive a copy of the report. This protocol was reviewed and patient is in agreement to hold the results for genetics review and direct contact.          Connie Reyna, [facility]  Genetic Counselor   [facility]   Phone: [phone]  Pager: [phone]            Approximate Time Spent in Consultation: 75 min         This note was written with the assistance of voice recognition software and may contain occasional typographic errors. Please contact our office if you identify errors requiring correction.

## 2024-09-24 NOTE — LETTER
"2024      RE: Ann Lemus  [address]  [facility] [address]     Dear Colleague,    Thank you for the opportunity to participate in the care of your patient, Ann Lemus, at the [facility] at [facility]. Please see a copy of my visit note below.    Name:  Ann Brown \"Ann\"  :   2000  MRN:   [id number]  Date of service: Sep 24, 2024  Primary Provider: [facility], [facility]  Referring Provider: No ref. provider found    PRESENTING INFORMATION   Reason for consultation:  A consultation in the [facility] was requested for Ann, a 23 year old female, for evaluation of The primary encounter diagnosis was Family history of hypertrophic cardiomyopathy. A diagnosis of Heart murmur was also pertinent to this visit.     Ann was accompanied to this visit by her  and sons. A  facilitated today's visit.    History is obtained from Patient, electronic health record, and . I met with Ann to review her son's results and to obtain informed consent for genetic testing for herself.      ASSESSMENT & PLAN  Ann is a 23 year old-year old female with a history of a heart murmur. She has not had cardiac imaging. Family history is significant for son with severe septal hypertrophy diagnosed at birth. His genetic testing recently identified two variants in MYBPC3 (one pathogenic and one uncertain, phasing unknown). He also had one uncertain variant in ACTN2.     We reviewed that John's genetic testing was positive for MYBPC3-related HCM. Most people with MYBPC3-HCM have a later onset, but people with multiple cardiomyopathy variants can have an earlier onset. We therefore suspect that at least one of the other uncertain variants is also pathogenic. Genetic testing can be coordinated for " Ann and Mikhail. This is medically necessary as the presence of the pathogenic variant would necessitate ongoing cardiomyopathy screening. Early screening and intervention can prevent heart failure and dangerous arrhythmias. It can also inform the risks to siblings and other relatives, improving our ability to perform cascade genetic testing. We will also test Ann and Mikhail for the uncertain variants to aid in interpretation of these variants. Genetic testing today was offered: MYBPC3 and ACTN2 variant testing. Ann provided informed consent for the testing.     blood sample will be collected and sent to [email or web address] for MYBPC3 (2 variants) and ACTN2 (1 variant) targeted tests  Orders Placed This Encounter   Procedures     Other Laboratory; [facility]; MYBPC3 and ACTN2 targeted tests (TF68). DO NOT BILL PATIENT (Laboratory Miscellaneous Order)     Other Laboratory; [facility]; MYBPC3 Known Familial Variant (9011) DO NOT BILL PATIENT (Laboratory Miscellaneous Order)     Cost of testing is expected to be $0 out-of-pocket due to Medicaid plan  After testing is initiated, results will be returned by phone in 4-6 weeks. Permission to share results with Mikhail. Follow-up dependent on results  Genetic testing for dad is available. We had the financial counselors reach out to him to help him get insurance coverage or logan care  Brother, Edgardo, was scheduled in Cardiogenetics clinic with myself and Dr. Padron 10/22/24.  Half brothers live in [facility]. They can see a local cardiologist and genetic counselor, if possible. I will write a letter to their parents and have it translated to [place].  Contact information was provided should any questions arise in the future.       HPI:  Ann is a 23 year old-year old female with a family history of hypertrophic cardiomyopathy in her infant son, John. He was found to have one pathogenic variant in MYBPC3. He also had two variants of uncertain significance in MYBPC3 and ACTN2.  I met with Ann today to review his results and offer genetic testing.    Ann reports a heart murmur in infancy. Her parents were told it would self-resolve. She has never had cardiac imaging. She reports that providers have listened to her heart since and have not heard a murmur. She denied cardiac symptoms.    Her goals today are to better understand the genetic test results. They are concerned about risks to relatives/themselves.    Patient Active Problem List   Diagnosis     History of Ectopic pregnancy     Immigrant with language difficulty     Does not have health insurance     High-risk pregnancy, unspecified trimester     Hx of  section     Vitamin D deficiency     Immunization due Hep B non-immune     Headache     Encounter for triage in pregnant patient     H/O unilateral salpingectomy     History of  delivery, currently pregnant     Delivery of pregnancy by  section       Pertinent studies/abnormal test results:   No history of genetic testing    Imaging results:   none  No results found for this or any previous visit (from the past 744 hour(s)).  No results found for any visits on 24.  No results found for this or any previous visit (from the past 8760 hour(s)).    Past Medical History:  Past Medical History:   Diagnosis Date     History of ectopic pregnancy 10/29/2021    required R salpingectomy due to rupture       Past Surgical History:  Past Surgical History:   Procedure Laterality Date     C/SECTION, LOW TRANSVERSE Bilateral 2018 due to oligohydramnios      SECTION N/A 2024    Procedure:  section;  Surgeon: Anita Chi MD;  Location:  L+D     LAPAROSCOPIC SALPINGECTOMY Right 2021    d/t ruptured ectopic pregnancy        FAMILY HISTORY  A three generation pedigree was obtained today and scanned into the EMR. The following information is significant:     7 week old soneJohn, severe septal hypertrophy identified at birth.  MYBPC3 variants (2) and ACTN2 variant (see below)   4yo son who is well. No echo. Lives with family  Father: well  Mother: well  Siblings: well  Nieces/nephews: well     The family history is otherwise negative for cardiomyopathy, arrhythmia, sudden death, HF, ataxia, weakness, seizure, endocrinopathy, HL, VL, DD, ID, birth defects, reduced growth birth defects, sudden death, and known genetic disorders. Consanguinity is denied.    SOCIAL HISTORY  Lives with  and two children.     DISCUSSION  Genetics  Today we reviewed that our genetic material or DNA is responsible for how our bodies grow and develop. It can be thought of as an instruction manual. This instruction manual is made up of chapters called genes. Our genes are inherited on structures called chromosomes, of which we have 23 pairs for a total of 46. For each chromosome pair, one copy is inherited from the mother and one is inherited from the father. The chromosome pairs are numbered from 1 to 22, and the 23rd pair of chromsomes is called the sex chromsomes. These determine if we are a male or female.     Changes in the chromosomes or in the DNA sequence of a gene can cause the signs and symptoms of a genetic condition because the instructions it is providing to the body have been altered. This can be a small spelling error in the gene, a large duplicated piece of information, or a large missing piece of information.     John's Genetic Test Results  Next Generation Sequencing (NGS) for cardiomyopathy was completed at [facility]. 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 He also had a variant of uncertain significance in MYBPC3. Biallelic variants in MYBPC3 can cause a  hypertrophic  cardiomyopathy, so we are suspicious that the uncertain variant is also pathogenic.     John had a variant of uncertain significance in ACTN2. It is unclear if this variant is pathogenic or benign due to the limited information available about the variant. Pathogenic ACTN2 variants can cause hypertrophic cardiomyopathy, so we cannot exclude that this variant may be related to John's cardiomyopathy.      We reviewed the concepts of genetics. We reviewed that these cardiac genes are like recipes for the heart muscle that help to create and maintain it. John has spelling changes in these heart genes, which cause the heart muscle to be made/maintained abnormally. These sorts of genetics changes are a common cause of cardiomyopathy. John was born with these genetic changes.  They may have been inherited from one or both parents. Genetic testing for his parents is therefore available. His full brother may have these variants as well, so we have coordinated a visit for him in cardiogenetics clinic for genetic testing and echo/EKG. His paternal half brothers do not live with the family. They can see a local cardiologist and genetic counselor. We will write a family letter for them to share with their mothers.    Genetic Testing  MYBPC3 and ACTN2 genetic testing is available to Ann to look for all three variants. This can help us with her care (cardiomyopathy risk and screening) as well as her children's care (variant phasing). The potential results were discussed including a positive, negative, or variant of uncertain significance.     Management and surveillance of Ann will depend on the genetic test results. It can also help predict the recurrence risk for Ann and other family members to have another child with similar healthcare needs.    The lab will perform MYBPC3 VUS and ACTN2 testing free of charge. MYBPC3 pathogenic variant testing is charged (through insurance or $99 self pay). We expect the cost to be $0  out-of-pocket to her through insurance.     Lab results may be automatically released via [email or web address].  Department protocol is to hold genetic testing results until we have reviewed them. We will then contact the patient directly to disclose the results and ensure they receive a copy of the report. This protocol was reviewed and patient is in agreement to hold the results for genetics review and direct contact.         Connie Reyna [facility]  Genetic Counselor   [facility]   Phone: [phone]  Pager: [phone]            Approximate Time Spent in Consultation: 75 min         This note was written with the assistance of voice recognition software and may contain occasional typographic errors. Please contact our office if you identify errors requiring correction.      Please do not hesitate to contact me if you have any questions/concerns.     Sincerely,       Connie Reyna, GC

## 2024-09-25 LAB
Lab: 9011
Lab: NORMAL
PERFORMING LABORATORY: NORMAL
PERFORMING LABORATORY: NORMAL
SPECIMEN STATUS: NORMAL
SPECIMEN STATUS: NORMAL
TEST NAME: NORMAL
TEST NAME: NORMAL

## 2024-10-10 LAB
SCANNED LAB RESULT: NORMAL
SCANNED LAB RESULT: NORMAL
TEST NAME: NORMAL
TEST NAME: NORMAL

## 2024-10-22 ENCOUNTER — DOCUMENTATION ONLY (OUTPATIENT)
Dept: CONSULT | Facility: CLINIC | Age: 24
End: 2024-10-22
Payer: COMMERCIAL

## 2024-10-22 NOTE — TELEPHONE ENCOUNTER
Discussed genetic test results in person with Ann at her son's appointment. She did not have the MYBPC3 or ACTN2 variants. Her  had both VUSs. Results provided to Ann today    Connie Reyna [facility]  Genetic Counselor   [facility]   Phone: [phone]

## 2025-02-09 ENCOUNTER — APPOINTMENT (OUTPATIENT)
Dept: CT IMAGING | Facility: CLINIC | Age: 25
End: 2025-02-09
Attending: EMERGENCY MEDICINE
Payer: COMMERCIAL

## 2025-02-09 ENCOUNTER — HOSPITAL ENCOUNTER (EMERGENCY)
Facility: CLINIC | Age: 25
Discharge: HOME | End: 2025-02-10
Attending: EMERGENCY MEDICINE | Admitting: EMERGENCY MEDICINE
Payer: COMMERCIAL

## 2025-02-09 VITALS
HEART RATE: 62 BPM | HEIGHT: 65 IN | WEIGHT: 130 LBS | BODY MASS INDEX: 21.66 KG/M2 | SYSTOLIC BLOOD PRESSURE: 108 MMHG | DIASTOLIC BLOOD PRESSURE: 64 MMHG | TEMPERATURE: 98.9 F | RESPIRATION RATE: 18 BRPM | OXYGEN SATURATION: 98 %

## 2025-02-09 DIAGNOSIS — N83.202 BILATERAL OVARIAN CYSTS: ICD-10-CM

## 2025-02-09 DIAGNOSIS — R10.9 NONSPECIFIC ABDOMINAL PAIN: ICD-10-CM

## 2025-02-09 DIAGNOSIS — N83.201 BILATERAL OVARIAN CYSTS: ICD-10-CM

## 2025-02-09 LAB
ALBUMIN SERPL BCG-MCNC: 4.5 G/DL (ref 3.5–5.2)
ALBUMIN UR-MCNC: NEGATIVE MG/DL
ALP SERPL-CCNC: 145 U/L (ref 40–150)
ALT SERPL W P-5'-P-CCNC: 49 U/L (ref 0–50)
ANION GAP SERPL CALCULATED.3IONS-SCNC: 12 MMOL/L (ref 7–15)
APPEARANCE UR: CLEAR
AST SERPL W P-5'-P-CCNC: 39 U/L (ref 0–45)
BASOPHILS # BLD AUTO: 0 10E3/UL (ref 0–0.2)
BASOPHILS NFR BLD AUTO: 0 %
BILIRUB SERPL-MCNC: 0.2 MG/DL
BILIRUB UR QL STRIP: NEGATIVE
BUN SERPL-MCNC: 11.2 MG/DL (ref 6–20)
CALCIUM SERPL-MCNC: 9.9 MG/DL (ref 8.8–10.4)
CHLORIDE SERPL-SCNC: 100 MMOL/L (ref 98–107)
CLUE CELLS: ABNORMAL
COLOR UR AUTO: ABNORMAL
CREAT SERPL-MCNC: 0.48 MG/DL (ref 0.51–0.95)
EGFRCR SERPLBLD CKD-EPI 2021: >90 ML/MIN/1.73M2
EOSINOPHIL # BLD AUTO: 0.2 10E3/UL (ref 0–0.7)
EOSINOPHIL NFR BLD AUTO: 3 %
ERYTHROCYTE [DISTWIDTH] IN BLOOD BY AUTOMATED COUNT: 12.1 % (ref 10–15)
FLUAV RNA SPEC QL NAA+PROBE: NEGATIVE
FLUBV RNA RESP QL NAA+PROBE: NEGATIVE
GLUCOSE SERPL-MCNC: 97 MG/DL (ref 70–99)
GLUCOSE UR STRIP-MCNC: NEGATIVE MG/DL
HCG SERPL QL: NEGATIVE
HCO3 SERPL-SCNC: 26 MMOL/L (ref 22–29)
HCT VFR BLD AUTO: 40.2 % (ref 35–47)
HGB BLD-MCNC: 13.8 G/DL (ref 11.7–15.7)
HGB UR QL STRIP: ABNORMAL
HOLD SPECIMEN: NORMAL
IMM GRANULOCYTES # BLD: 0 10E3/UL
IMM GRANULOCYTES NFR BLD: 0 %
KETONES UR STRIP-MCNC: NEGATIVE MG/DL
LEUKOCYTE ESTERASE UR QL STRIP: ABNORMAL
LIPASE SERPL-CCNC: 22 U/L (ref 13–60)
LYMPHOCYTES # BLD AUTO: 3 10E3/UL (ref 0.8–5.3)
LYMPHOCYTES NFR BLD AUTO: 44 %
MCH RBC QN AUTO: 30.7 PG (ref 26.5–33)
MCHC RBC AUTO-ENTMCNC: 34.3 G/DL (ref 31.5–36.5)
MCV RBC AUTO: 89 FL (ref 78–100)
MONOCYTES # BLD AUTO: 0.4 10E3/UL (ref 0–1.3)
MONOCYTES NFR BLD AUTO: 6 %
MUCOUS THREADS #/AREA URNS LPF: PRESENT /LPF
NEUTROPHILS # BLD AUTO: 3.1 10E3/UL (ref 1.6–8.3)
NEUTROPHILS NFR BLD AUTO: 46 %
NITRATE UR QL: NEGATIVE
NRBC # BLD AUTO: 0 10E3/UL
NRBC BLD AUTO-RTO: 0 /100
PH UR STRIP: 6.5 [PH] (ref 5–7)
PLATELET # BLD AUTO: 323 10E3/UL (ref 150–450)
POTASSIUM SERPL-SCNC: 3.9 MMOL/L (ref 3.4–5.3)
PROT SERPL-MCNC: 8.1 G/DL (ref 6.4–8.3)
RBC # BLD AUTO: 4.5 10E6/UL (ref 3.8–5.2)
RBC URINE: 1 /HPF
RSV RNA SPEC NAA+PROBE: NEGATIVE
SARS-COV-2 RNA RESP QL NAA+PROBE: NEGATIVE
SODIUM SERPL-SCNC: 138 MMOL/L (ref 135–145)
SP GR UR STRIP: 1.01 (ref 1–1.03)
SQUAMOUS EPITHELIAL: 5 /HPF
TRICHOMONAS, WET PREP: ABNORMAL
UROBILINOGEN UR STRIP-MCNC: NORMAL MG/DL
WBC # BLD AUTO: 6.7 10E3/UL (ref 4–11)
WBC URINE: 1 /HPF
WBC'S/HIGH POWER FIELD, WET PREP: ABNORMAL
YEAST, WET PREP: ABNORMAL

## 2025-02-09 PROCEDURE — 74177 CT ABD & PELVIS W/CONTRAST: CPT

## 2025-02-09 PROCEDURE — 80053 COMPREHEN METABOLIC PANEL: CPT | Performed by: EMERGENCY MEDICINE

## 2025-02-09 PROCEDURE — 87591 N.GONORRHOEAE DNA AMP PROB: CPT | Performed by: EMERGENCY MEDICINE

## 2025-02-09 PROCEDURE — 250N000011 HC RX IP 250 OP 636: Performed by: EMERGENCY MEDICINE

## 2025-02-09 PROCEDURE — 87637 SARSCOV2&INF A&B&RSV AMP PRB: CPT | Performed by: EMERGENCY MEDICINE

## 2025-02-09 PROCEDURE — 36415 COLL VENOUS BLD VENIPUNCTURE: CPT | Performed by: EMERGENCY MEDICINE

## 2025-02-09 PROCEDURE — 83690 ASSAY OF LIPASE: CPT | Performed by: EMERGENCY MEDICINE

## 2025-02-09 PROCEDURE — 93005 ELECTROCARDIOGRAM TRACING: CPT

## 2025-02-09 PROCEDURE — 96374 THER/PROPH/DIAG INJ IV PUSH: CPT | Mod: 59

## 2025-02-09 PROCEDURE — 99285 EMERGENCY DEPT VISIT HI MDM: CPT | Mod: 25

## 2025-02-09 PROCEDURE — 250N000009 HC RX 250: Performed by: EMERGENCY MEDICINE

## 2025-02-09 PROCEDURE — 85025 COMPLETE CBC W/AUTO DIFF WBC: CPT | Performed by: EMERGENCY MEDICINE

## 2025-02-09 PROCEDURE — 87210 SMEAR WET MOUNT SALINE/INK: CPT | Performed by: EMERGENCY MEDICINE

## 2025-02-09 PROCEDURE — 84155 ASSAY OF PROTEIN SERUM: CPT | Performed by: EMERGENCY MEDICINE

## 2025-02-09 PROCEDURE — 80051 ELECTROLYTE PANEL: CPT | Performed by: EMERGENCY MEDICINE

## 2025-02-09 PROCEDURE — 250N000013 HC RX MED GY IP 250 OP 250 PS 637: Performed by: EMERGENCY MEDICINE

## 2025-02-09 PROCEDURE — 84703 CHORIONIC GONADOTROPIN ASSAY: CPT | Performed by: EMERGENCY MEDICINE

## 2025-02-09 PROCEDURE — 81001 URINALYSIS AUTO W/SCOPE: CPT | Performed by: EMERGENCY MEDICINE

## 2025-02-09 PROCEDURE — 87491 CHLMYD TRACH DNA AMP PROBE: CPT | Performed by: EMERGENCY MEDICINE

## 2025-02-09 RX ORDER — IOPAMIDOL 755 MG/ML
65 INJECTION, SOLUTION INTRAVASCULAR ONCE
Status: COMPLETED | OUTPATIENT
Start: 2025-02-09 | End: 2025-02-09

## 2025-02-09 RX ORDER — ONDANSETRON 4 MG/1
4 TABLET, ORALLY DISINTEGRATING ORAL ONCE
Status: COMPLETED | OUTPATIENT
Start: 2025-02-09 | End: 2025-02-09

## 2025-02-09 RX ORDER — ACETAMINOPHEN 500 MG
1000 TABLET ORAL ONCE
Status: COMPLETED | OUTPATIENT
Start: 2025-02-09 | End: 2025-02-09

## 2025-02-09 RX ORDER — ONDANSETRON 2 MG/ML
4 INJECTION INTRAMUSCULAR; INTRAVENOUS EVERY 30 MIN PRN
Status: DISCONTINUED | OUTPATIENT
Start: 2025-02-09 | End: 2025-02-10 | Stop reason: HOSPADM

## 2025-02-09 RX ORDER — KETOROLAC TROMETHAMINE 15 MG/ML
10 INJECTION, SOLUTION INTRAMUSCULAR; INTRAVENOUS ONCE
Status: COMPLETED | OUTPATIENT
Start: 2025-02-09 | End: 2025-02-09

## 2025-02-09 RX ADMIN — SODIUM CHLORIDE 60 ML: 9 INJECTION, SOLUTION INTRAVENOUS at 23:39

## 2025-02-09 RX ADMIN — IOPAMIDOL 65 ML: 755 INJECTION, SOLUTION INTRAVENOUS at 23:39

## 2025-02-09 RX ADMIN — KETOROLAC TROMETHAMINE 10 MG: 15 INJECTION, SOLUTION INTRAMUSCULAR; INTRAVENOUS at 22:46

## 2025-02-09 RX ADMIN — ACETAMINOPHEN 1000 MG: 500 TABLET, FILM COATED ORAL at 21:15

## 2025-02-09 RX ADMIN — ONDANSETRON 4 MG: 4 TABLET, ORALLY DISINTEGRATING ORAL at 20:39

## 2025-02-09 ASSESSMENT — ACTIVITIES OF DAILY LIVING (ADL)
ADLS_ACUITY_SCORE: 50

## 2025-02-10 LAB
ATRIAL RATE - MUSE: 63 BPM
C TRACH DNA SPEC QL NAA+PROBE: NEGATIVE
DIASTOLIC BLOOD PRESSURE - MUSE: NORMAL MMHG
INTERPRETATION ECG - MUSE: NORMAL
N GONORRHOEA DNA SPEC QL NAA+PROBE: NEGATIVE
P AXIS - MUSE: 40 DEGREES
PR INTERVAL - MUSE: 170 MS
QRS DURATION - MUSE: 94 MS
QT - MUSE: 380 MS
QTC - MUSE: 388 MS
R AXIS - MUSE: 64 DEGREES
SPECIMEN TYPE: NORMAL
SPECIMEN TYPE: NORMAL
SYSTOLIC BLOOD PRESSURE - MUSE: NORMAL MMHG
T AXIS - MUSE: 32 DEGREES
VENTRICULAR RATE- MUSE: 63 BPM

## 2025-02-10 RX ORDER — ONDANSETRON 4 MG/1
4 TABLET, ORALLY DISINTEGRATING ORAL EVERY 8 HOURS PRN
Qty: 12 TABLET | Refills: 0 | Status: SHIPPED | OUTPATIENT
Start: 2025-02-10

## 2025-02-10 RX ORDER — NAPROXEN 500 MG/1
500 TABLET ORAL 2 TIMES DAILY PRN
Qty: 24 TABLET | Refills: 0 | Status: SHIPPED | OUTPATIENT
Start: 2025-02-10 | End: 2025-02-18

## 2025-02-10 NOTE — ED PROVIDER NOTES
"  Emergency Department Note      History of Present Illness     Chief Complaint   Abdominal Pain      HPI   Ann Lemus is a 24 year old female with history of ectopic pregnancy who presents with intermittent right sided abdominal pain with associated nausea, chills, headaches, dizziness for the past 6 days.  She denies any known fevers.  She did have diarrhea as well.  She notes vaginal discharge as well.  She also has dysuria.  Denies any vomiting.  10 days ago, she slipped and fell and struck her head.  She has had intermittent headaches since that time.  She is currently breast-feeding.  She has an IUD.  She had surgery for ectopic pregnancy and  for her last pregnancy, but denies any other surgeries.    Professional  was used throughout the encounter.    Independent Historian   None    Review of External Notes   I reviewed ER visit from 2025.  Patient was following up for IUD check after having IUD placed in September at 6 weeks postpartum.    Past Medical History     Medical History and Problem List   Past Medical History:   Diagnosis Date    History of ectopic pregnancy 10/29/2021       Medications   None    Surgical History   Past Surgical History:   Procedure Laterality Date    C/SECTION, LOW TRANSVERSE Bilateral 2018 due to oligohydramnios     SECTION N/A 2024    Procedure:  section;  Surgeon: Anita Chi MD;  Location:  L+D    LAPAROSCOPIC SALPINGECTOMY Right 2021    d/t ruptured ectopic pregnancy       Physical Exam     Patient Vitals for the past 24 hrs:   BP Temp Temp src Pulse Resp SpO2 Height Weight   25 2313 108/64 -- -- 62 -- 98 % -- --   25 2100 117/79 -- -- 64 -- 95 % -- --   25 -- -- -- -- -- 98 % -- --   25 124/81 -- -- 67 -- -- -- --   25 121/77 98.9  F (37.2  C) Oral 63 18 99 % 1.65 m (5' 4.96\") 59 kg (130 lb)     Physical Exam  Vitals and nursing note " reviewed.   Constitutional:       General: She is not in acute distress.     Appearance: She is not ill-appearing.   HENT:      Head: Normocephalic and atraumatic. No contusion.      Right Ear: External ear normal.      Left Ear: External ear normal.      Nose: Nose normal.      Mouth/Throat:      Mouth: Mucous membranes are moist.   Eyes:      Extraocular Movements: Extraocular movements intact.      Conjunctiva/sclera: Conjunctivae normal.   Cardiovascular:      Rate and Rhythm: Normal rate and regular rhythm.      Heart sounds: No murmur heard.  Pulmonary:      Effort: Pulmonary effort is normal. No respiratory distress.      Breath sounds: Normal breath sounds. No wheezing, rhonchi or rales.   Abdominal:      General: Abdomen is flat. Bowel sounds are normal. There is no distension.      Palpations: Abdomen is soft.      Tenderness: There is generalized abdominal tenderness (very mild). There is no guarding or rebound.   Musculoskeletal:         General: No deformity or signs of injury.      Cervical back: Normal range of motion and neck supple. No tenderness.   Skin:     General: Skin is warm and dry.      Findings: No rash.   Neurological:      Mental Status: She is alert and oriented to person, place, and time.      Cranial Nerves: No cranial nerve deficit.      Sensory: No sensory deficit.      Motor: No weakness.   Psychiatric:         Behavior: Behavior normal.           Diagnostics     Lab Results   Labs Ordered and Resulted from Time of ED Arrival to Time of ED Departure   COMPREHENSIVE METABOLIC PANEL - Abnormal       Result Value    Sodium 138      Potassium 3.9      Carbon Dioxide (CO2) 26      Anion Gap 12      Urea Nitrogen 11.2      Creatinine 0.48 (*)     GFR Estimate >90      Calcium 9.9      Chloride 100      Glucose 97      Alkaline Phosphatase 145      AST 39      ALT 49      Protein Total 8.1      Albumin 4.5      Bilirubin Total 0.2     ROUTINE UA WITH MICROSCOPIC REFLEX TO CULTURE -  Abnormal    Color Urine Straw      Appearance Urine Clear      Glucose Urine Negative      Bilirubin Urine Negative      Ketones Urine Negative      Specific Gravity Urine 1.012      Blood Urine Small (*)     pH Urine 6.5      Protein Albumin Urine Negative      Urobilinogen Urine Normal      Nitrite Urine Negative      Leukocyte Esterase Urine Trace (*)     Mucus Urine Present (*)     RBC Urine 1      WBC Urine 1      Squamous Epithelials Urine 5 (*)    WET PREPARATION - Abnormal    Trichomonas Absent      Yeast Absent      Clue Cells Absent      WBCs/high power field 2+ (*)    INFLUENZA A/B, RSV AND SARS-COV2 PCR - Normal    Influenza A PCR Negative      Influenza B PCR Negative      RSV PCR Negative      SARS CoV2 PCR Negative     LIPASE - Normal    Lipase 22     HCG QUALITATIVE PREGNANCY - Normal    hCG Serum Qualitative Negative     CBC WITH PLATELETS AND DIFFERENTIAL    WBC Count 6.7      RBC Count 4.50      Hemoglobin 13.8      Hematocrit 40.2      MCV 89      MCH 30.7      MCHC 34.3      RDW 12.1      Platelet Count 323      % Neutrophils 46      % Lymphocytes 44      % Monocytes 6      % Eosinophils 3      % Basophils 0      % Immature Granulocytes 0      NRBCs per 100 WBC 0      Absolute Neutrophils 3.1      Absolute Lymphocytes 3.0      Absolute Monocytes 0.4      Absolute Eosinophils 0.2      Absolute Basophils 0.0      Absolute Immature Granulocytes 0.0      Absolute NRBCs 0.0     CHLAMYDIA TRACHOMATIS PCR   NEISSERIA GONORRHOEAE PCR       Imaging   CT Abdomen Pelvis w Contrast   Final Result   IMPRESSION:    1.  No inflammatory change, bowel obstruction or abscess.   2.  Bilateral ovarian cyst.          EKG   ECG results from 02/09/25   EKG 12-lead, tracing only     Value    Systolic Blood Pressure     Diastolic Blood Pressure     Ventricular Rate 63    Atrial Rate 63    WY Interval 170    QRS Duration 94        QTc 388    P Axis 40    R AXIS 64    T Axis 32    Interpretation ECG      Sinus  rhythm  Normal ECG  No previous ECGs available         Independent Interpretation   None    ED Course      Medications Administered   Medications   ondansetron (ZOFRAN) injection 4 mg (has no administration in time range)   ondansetron (ZOFRAN ODT) ODT tab 4 mg (4 mg Oral $Given 2/9/25 2039)   acetaminophen (TYLENOL) tablet 1,000 mg (1,000 mg Oral $Given 2/9/25 2115)   ketorolac (TORADOL) injection 10 mg (10 mg Intravenous $Given 2/9/25 2246)   iopamidol (ISOVUE-370) solution 65 mL (65 mLs Intravenous $Given 2/9/25 2339)   sodium chloride 0.9 % bag 500mL for CT scan flush use (60 mLs Intravenous $Given 2/9/25 2339)       Procedures   Procedures     Discussion of Management   None    ED Course        Additional Documentation  None    Medical Decision Making / Diagnosis     CMS Diagnoses: None    MIPS       None    MDM   Ann Maisha Lemus is a 24 year old female who presents with various complaints.  She has had abdominal pain, nausea, dizziness, headaches for the past 6 days.  Her abdominal exam is fairly benign in the emergency department.  For the low suspicion for appendicitis given the intermittent nature of her symptoms and benign exam.  She has had ectopic pregnancy in the past so this is certainly possible, however she is breast-feeding and has an IUD so this is less likely.  She reports some vaginal discharge so PID is also possible but she has no significant pelvic tenderness.  She could also have an ovarian cyst or less likely torsion given her pain seems mild.  She has no significant right upper quadrant tenderness to suggest biliary colic or cholecystitis.  Will check labs and give pain meds and antiemetics.    0035 patient's labs and CT are unrevealing.  She does have bilateral ovarian cyst but it is unclear if these are responsible for any of her pain.  Her lab work is also reassuring.  She does feel improved after meds and fluids.  We will plan to send her home with a prescription for  naproxen and Zofran and I recommend that she follow-up promptly with her outpatient primary care provider.  We discussed return precautions.    Disposition   The patient was discharged.     Diagnosis     ICD-10-CM    1. Nonspecific abdominal pain  R10.9       2. Bilateral ovarian cysts  N83.201     N83.202            Discharge Medications   New Prescriptions    NAPROXEN (NAPROSYN) 500 MG TABLET    Take 1 tablet (500 mg) by mouth 2 times daily as needed for moderate pain.    ONDANSETRON (ZOFRAN ODT) 4 MG ODT TAB    Take 1 tablet (4 mg) by mouth every 8 hours as needed for nausea or vomiting.         MD Brooklyn Benito Shaun M, MD  02/10/25 0038

## 2025-02-10 NOTE — ED TRIAGE NOTES
"Since 1400 has nausea and \"lung pain\" and body aches.  Since Monday right sided abdominal pain that is worse with walking. Patient is breastfeeding.    Triage Assessment (Adult)       Row Name 02/09/25 2016          Triage Assessment    Airway WDL WDL        Respiratory WDL    Respiratory WDL WDL        Skin Circulation/Temperature WDL    Skin Circulation/Temperature WDL WDL        Cardiac WDL    Cardiac WDL WDL        Peripheral/Neurovascular WDL    Peripheral Neurovascular WDL WDL        Cognitive/Neuro/Behavioral WDL    Cognitive/Neuro/Behavioral WDL WDL                     "

## 2025-03-04 ENCOUNTER — TRANSCRIBE ORDERS (OUTPATIENT)
Dept: OTHER | Age: 25
End: 2025-03-04

## 2025-03-04 DIAGNOSIS — N83.201 BILATERAL OVARIAN CYSTS: Primary | ICD-10-CM

## 2025-03-04 DIAGNOSIS — N83.202 BILATERAL OVARIAN CYSTS: Primary | ICD-10-CM

## 2025-03-21 ENCOUNTER — HOSPITAL ENCOUNTER (EMERGENCY)
Facility: CLINIC | Age: 25
Discharge: HOME | End: 2025-03-21
Attending: EMERGENCY MEDICINE | Admitting: EMERGENCY MEDICINE
Payer: COMMERCIAL

## 2025-03-21 VITALS
HEIGHT: 64 IN | OXYGEN SATURATION: 99 % | BODY MASS INDEX: 25.61 KG/M2 | SYSTOLIC BLOOD PRESSURE: 104 MMHG | RESPIRATION RATE: 18 BRPM | DIASTOLIC BLOOD PRESSURE: 67 MMHG | TEMPERATURE: 97.7 F | WEIGHT: 150 LBS | HEART RATE: 78 BPM

## 2025-03-21 DIAGNOSIS — J02.8 SORE THROAT (VIRAL): ICD-10-CM

## 2025-03-21 DIAGNOSIS — B97.89 SORE THROAT (VIRAL): ICD-10-CM

## 2025-03-21 DIAGNOSIS — S06.0X0A CONCUSSION WITHOUT LOSS OF CONSCIOUSNESS, INITIAL ENCOUNTER: ICD-10-CM

## 2025-03-21 DIAGNOSIS — F07.81 POST CONCUSSION SYNDROME: ICD-10-CM

## 2025-03-21 LAB — S PYO DNA THROAT QL NAA+PROBE: NOT DETECTED

## 2025-03-21 PROCEDURE — 250N000011 HC RX IP 250 OP 636: Performed by: EMERGENCY MEDICINE

## 2025-03-21 PROCEDURE — 250N000013 HC RX MED GY IP 250 OP 250 PS 637: Performed by: EMERGENCY MEDICINE

## 2025-03-21 PROCEDURE — 99283 EMERGENCY DEPT VISIT LOW MDM: CPT

## 2025-03-21 PROCEDURE — 87651 STREP A DNA AMP PROBE: CPT | Performed by: EMERGENCY MEDICINE

## 2025-03-21 RX ORDER — ONDANSETRON 4 MG/1
4 TABLET, ORALLY DISINTEGRATING ORAL EVERY 6 HOURS PRN
Qty: 10 TABLET | Refills: 0 | Status: SHIPPED | OUTPATIENT
Start: 2025-03-21

## 2025-03-21 RX ORDER — ONDANSETRON 4 MG/1
4 TABLET, ORALLY DISINTEGRATING ORAL ONCE
Status: COMPLETED | OUTPATIENT
Start: 2025-03-21 | End: 2025-03-21

## 2025-03-21 RX ORDER — IBUPROFEN 600 MG/1
600 TABLET, FILM COATED ORAL ONCE
Status: COMPLETED | OUTPATIENT
Start: 2025-03-21 | End: 2025-03-21

## 2025-03-21 RX ADMIN — IBUPROFEN 600 MG: 600 TABLET ORAL at 17:09

## 2025-03-21 RX ADMIN — ONDANSETRON 4 MG: 4 TABLET, ORALLY DISINTEGRATING ORAL at 16:26

## 2025-03-21 ASSESSMENT — ACTIVITIES OF DAILY LIVING (ADL)
ADLS_ACUITY_SCORE: 50
ADLS_ACUITY_SCORE: 50

## 2025-03-21 NOTE — ED TRIAGE NOTES
2 days ago the pt tripped and hit the back of her head and has been having headaches ever since, no relief with tylenol. No LOC. Not on blood thinners.   Pt reports pain in her eyes, blurry vision and nausea. Pain has been getting progressively worse since the fall.

## 2025-03-21 NOTE — ED PROVIDER NOTES
"  Emergency Department Note      History of Present Illness     Chief Complaint   Head Injury    HPI   Ann Lemus is a 24 year old female presenting with evaluation after a head injury. Two days ago, Ann slipped and fell and hit the back of her head.  No associated loss of consciousness.  She endorses nausea and fatigue, dizziness, headache, lightheadedness, and sore throat. The patient is currently on antibiotics/omeprazole for H. Pylori.  She is not anticoagulated.  Symptoms been waxing and waning though worsened this evening.  Has been taking Tylenol without significant relief.    Independent Historian   None    Review of External Notes   N/A    Past Medical History     Medical History and Problem List   History of ectopic pregnancy    Medications   Unisom   Vistaril   Mylicon   Prilosec   Flagyl   Arythromyasin     Surgical History    section   Salpingectomy     Physical Exam     Patient Vitals for the past 24 hrs:   BP Temp Temp src Pulse Resp SpO2 Height Weight   25 1620 104/67 97.7  F (36.5  C) Temporal 78 18 99 % 1.626 m (5' 4\") 68 kg (150 lb)     Physical Exam  General: Alert and cooperative with exam. Patient in mild distress. Normal mentation.  Well-appearing/nontoxic appearance  Head:  Scalp is NC/AT  Eyes:  No scleral icterus, PERRL  ENT:  The external nose and ears are normal. The oropharynx demonstrates moderate posterior erythema; mucus membranes are moist. Uvula midline, no evidence of deep space infection.  TMs clear bilaterally   Neck:  Normal range of motion without rigidity.  CV:  Regular rate and rhythm    No pathologic murmur   Resp:  Breath sounds are clear bilaterally    Non-labored, no retractions or accessory muscle use  GI:  Abdomen is soft, no distension, no tenderness. No peritoneal signs  MS:  No lower extremity edema   Skin:  Warm and dry, No rash or lesions noted.  Neuro: Oriented x 3. No gross motor deficits.      Diagnostics     Lab Results "   Labs Ordered and Resulted from Time of ED Arrival to Time of ED Departure   GROUP A STREPTOCOCCUS PCR THROAT SWAB - Normal       Result Value    Group A strep by PCR Not Detected         Imaging   No orders to display       Independent Interpretation   None    ED Course      Medications Administered   Medications   ondansetron (ZOFRAN ODT) ODT tab 4 mg (4 mg Oral $Given 3/21/25 1626)   ibuprofen (ADVIL/MOTRIN) tablet 600 mg (600 mg Oral $Given 3/21/25 1709)   ondansetron (ZOFRAN ODT) ODT tab 4 mg (4 mg Oral Not Given 3/21/25 1710)       Procedures   Procedures     Discussion of Management   None    ED Course   ED Course as of 03/22/25 0044   Fri Mar 21, 2025   1655 I obtained the history and examined the patient as noted above.          Additional Documentation  None    Medical Decision Making / Diagnosis     CMS Diagnoses: None    MIPS       None    MDM   Ann Maisha Lemus is a 24 year old female This patient presents with a history and clinical exam consistent with concussion/postconcussion syndrome.  The differential diagnosis includes skull fracture, epidural hematoma, subdural hematoma, intracerebral hemorrhage, and traumatic subarachnoid hemorrhage; all of these are highly unlikely in this clinical setting.  The patient did not have prolonged LOC, sleepiness, repeated emesis, poor orientation, or significant irritability.  I have discussed the risk/benefit analysis with the patient regarding CT imaging and have elected to defer.  By the [place] head CT rules, the patient in addition does not appear to warrant head CT imaging as well.   Return precautions were discussed and referral was placed to concussion  service.  Recommended continued treatment with Tylenol/ibuprofen was provided a prescription for Zofran for nausea.      Additionally she reported sore throat and has moderate posterior oropharynx erythema; no evidence of deep space infection.  Strep testing was negative.  Mono  not excluded though would not test at this time as there would be no change in management and high probability of false negative testing in the first week of illness.  Close follow-up with PCP recommended if not improving.  Return precautions discussed.      Disposition   The patient was discharged.     Diagnosis     ICD-10-CM    1. Concussion without loss of consciousness, initial encounter  S06.0X0A Concussion  Referral      2. Post concussion syndrome  F07.81 Concussion  Referral      3. Sore throat (viral)  J02.8     B97.89            Discharge Medications   Discharge Medication List as of 3/21/2025  6:18 PM        START taking these medications    Details   !! ondansetron (ZOFRAN ODT) 4 MG ODT tab Take 1 tablet (4 mg) by mouth every 6 hours as needed., Disp-10 tablet, R-0, E-Prescribe       !! - Potential duplicate medications found. Please discuss with provider.            Scribe Disclosure:  I, Brittney Burks, am serving as a scribe at 5:08 PM on 3/21/2025 to document services personally performed by Maurisio Francois DO based on my observations and the provider's statements to me.        Maurisio Francois DO  03/22/25 0049

## 2025-03-31 NOTE — PROGRESS NOTES
[facility]  Gynecology Visit    Visit conducted with [place] .     HPI:    Ann Lemus is a 24 year old , here for ovarian cysts. She was seen in the ED on 25 for pelvic pain and was noted to have bilateral small ovarian cysts on CT scan. She had a follow up including a Pelvic [place] at [facility] [place] which revealed interval resolution of ovarian cysts. Today, she reports having painful and heavy bleeding with since Paragard IUD was placed. She has bleeding twice per month and it is heavy with clots. Prior to paragard IUD placement, periods were regular, monthly, not heavy and not painful. Would like to discuss alternate options for contraceptoin.     OBHx  OB History    Para Term  AB Living   4 2 2 0 2 2   SAB IAB Ectopic Multiple Live Births   1 0 1 0 2      # Outcome Date GA Lbr Mauricio/2nd Weight Sex Type Anes PTL Lv   4 Term 25 39w4d  3.885 kg (8 lb 9 oz) M CS-LTranv Spinal N ALEJANDRO      Name: John Rivas      Apgar1: 8  Apgar5: 9   3 SAB      SAB      2 Ectopic 21     ECTOPIC   FD      Birth Comments: s/p right salpingectomy with removal of ruptured ectopic pregnancy   1 Term 18    M CS-LTranv EPI  ALEJANDRO      Birth Comments: In [facility]      Name: Alexandr Monet     [facility]  Past Medical History:   Diagnosis Date    History of ectopic pregnancy 10/29/2021    required R salpingectomy due to rupture     PSH  Past Surgical History:   Procedure Laterality Date    C/SECTION, LOW TRANSVERSE Bilateral 2018 due to oligohydramnios     SECTION N/A 2024    Procedure:  section;  Surgeon: Anita Chi MD;  Location:  L+D    LAPAROSCOPIC SALPINGECTOMY Right 2021    d/t ruptured ectopic pregnancy     Medications    Current Outpatient Medications:     metroNIDAZOLE (FLAGYL) 500 MG tablet, Take 500 mg by mouth., Disp: , Rfl:     acetaminophen (TYLENOL) 325 MG tablet, Take 2 tablets (650 mg) by mouth  every 6 hours as needed for mild pain Start after Delivery. (Patient not taking: Reported on 4/1/2025), Disp: 100 tablet, Rfl: 0    acetaminophen (TYLENOL) 500 MG tablet, Take 1-2 tablets (500-1,000 mg) by mouth every 6 hours as needed for mild pain (Patient not taking: Reported on 4/1/2025), Disp: 30 tablet, Rfl: 0    cholecalciferol (VITAMIN D3) 125 mcg (5000 units) capsule, Take 1 capsule (125 mcg) by mouth daily Take one capsule daily. (Patient not taking: Reported on 4/1/2025), Disp: 90 capsule, Rfl: 3    doxylamine (UNISOM) 25 MG TABS tablet, Take 1 tablet (25 mg) by mouth at bedtime (Patient not taking: Reported on 4/1/2025), Disp: 30 tablet, Rfl: 1    hydrOXYzine jose (VISTARIL) 25 MG capsule, Take 1 capsule (25 mg) by mouth 3 times daily as needed for other (Sleep aid) (Patient not taking: Reported on 4/1/2025), Disp: 30 capsule, Rfl: 0    ibuprofen (ADVIL/MOTRIN) 600 MG tablet, Take 1 tablet (600 mg) by mouth every 6 hours as needed for moderate pain Start after delivery (Patient not taking: Reported on 4/1/2025), Disp: 60 tablet, Rfl: 0    ondansetron (ZOFRAN ODT) 4 MG ODT tab, Take 1 tablet (4 mg) by mouth every 6 hours as needed. (Patient not taking: Reported on 4/1/2025), Disp: 10 tablet, Rfl: 0    ondansetron (ZOFRAN ODT) 4 MG ODT tab, Take 1 tablet (4 mg) by mouth every 8 hours as needed for nausea or vomiting. (Patient not taking: Reported on 4/1/2025), Disp: 12 tablet, Rfl: 0    Prenatal Vit-Fe Fumarate-FA (PRENATAL MULTIVITAMIN W/IRON) 27-0.8 MG tablet, Take 1 tablet by mouth (Patient not taking: Reported on 4/1/2025), Disp: , Rfl:     pyridOXINE (VITAMIN B6) 25 MG tablet, Take 1 tablet (25 mg) by mouth daily (Patient not taking: Reported on 4/1/2025), Disp: 30 tablet, Rfl: 1    senna-docusate (SENOKOT-S/PERICOLACE) 8.6-50 MG tablet, Take 1 tablet by mouth daily Start after delivery. (Patient not taking: Reported on 4/1/2025), Disp: 100 tablet, Rfl: 0    simethicone (MYLICON) 125 MG chewable  tablet, Take 1 tablet (125 mg) by mouth 4 times daily as needed for intestinal gas (Patient not taking: Reported on 4/1/2025), Disp: 90 tablet, Rfl: 3  No current facility-administered medications for this visit.    Allergies  Allergies   Allergen Reactions    Amoxicillin Rash and Hives     Family Hx  Family History   Problem Relation Age of Onset    No Known Problems Mother     No Known Problems Father     No Known Problems Maternal Grandmother     No Known Problems Maternal Grandfather     No Known Problems Paternal Grandmother     No Known Problems Paternal Grandfather     No Known Problems Brother     No Known Problems Sister     No Known Problems Sister     No Known Problems Sister     No Known Problems Son      Social Hx  Social History     Socioeconomic History    Marital status:      Spouse name: Andrew    Number of children: 1    Years of education: Not on file    Highest education level: Not on file   Occupational History    Not on file   Tobacco Use    Smoking status: Never    Smokeless tobacco: Never   Substance and Sexual Activity    Alcohol use: Not Currently    Drug use: Never    Sexual activity: Yes     Partners: Male   Other Topics Concern    Not on file   Social History Narrative    Not on file     Social Drivers of Health     Financial Resource Strain: Not at Risk (3/4/2025)    Received from [facility]    Financial Resource Strain     Financial Resource Strain: 1   Food Insecurity: Not at Risk (3/4/2025)    Received from [facility]    Food Insecurity     Food: 1   Transportation Needs: Not at Risk (3/4/2025)    Received from [facility]    Transportation Needs     Transportation: 1   Physical Activity: Not on File (1/14/2022)    [facility] from [facility]    Physical Activity     Physical Activity: 0   Stress: Not on File (1/14/2022)    Received from [facility]    Stress     Stress: 0   Social Connections: Not on File (9/20/2024)    Received from [facility]    Social Connections     Connectedness: 0  "  Interpersonal Safety: Not on file   Housing Stability: Not at Risk (3/4/2025)    Received from [facility] Stability     Housin     ROS: 10-Point ROS negative except as noted in HPI    Physical Exam  /64   Pulse 71   Ht 1.626 m (5' 4\")   Wt 66.5 kg (146 lb 9.6 oz)   BMI 25.16 kg/m    Gen: Well-appearing, NAD  HEENT: Normocephalic, atraumatic  CV:  Regular rate, well perfused  Pulm: Breathing comfortably on room air     PROCEDURE PERFORMED: Paragard IUD removal, Mirena IUD insertion    ANESTHESIA: none     CONSENT: Her questions were answered.  She was informed about the risks, benefits and alternatives to Mirena insertion.  She verbalized potential changes in bleeding pattern for at least 3-6 months, verbally consented to 6 month trial. She also verbalized understanding of the following risks: expulsion, infection, perforation, pregnancy, ectopic pregnancy and the possibility for the IUD to become embedded in the uterus.    Written informed consent was obtained and scanned into the medical record.  Patient received and verbalized understanding of discharge instrcutions.    PROCEDURE DETAILS:   The patient was placed in the dorsal lithotomy position. A speculum was inserted in the vagina, and the cervix visualized. The Paragard IUD strings were visualized and grasped with a ring forceps. The Paragard IUD was removed and noted to be intact.  The cervix was swabbed with betadine.    A single-toothed tenaculum was applied to the anterior lip of the cervix for stabilization. The uterus sounded to a depth of 9 cm with the Mirena IUD application device. The IUD was placed in the standard fashions and the strings were trimmed to 3 cm.      LOT # [id number]   EXP date: 2027   Expected removal date: 2033     EBL < 5 mL   Complications: none noted  Patient tolerated the procedure well.    Assessment/Plan:  Ann Lemus is a 24 year old  female here for heavy bleeding and " cramping with Paragard IUD interested in discussing alternate birth control methods. Discussed all options including condoms, Depo Provera, OCPs vs POPs, birth control patch, birth control ring, Mirena IUD, Nexplanon, salpingectomy, partner vasectomy. Discussed risks/benefits of all options. After discussion, she elected to proceed with Paragard IUD removal and Mirena IUD placement which was completed today in office and uncomplicated.   - Discussed Ibuprofen/Tylenol for post-procedural cramping ; Ibuprofen 600 mg administered in clinic  - Discussed removal at 8 years or sooner if desired   - Discussed anticipated changes in bleeding pattern     Staffed with Dr. Haim Bowers MD  Ob/Gyn PGY-3  04/01/25 5:04 PM    OBGYN Attending Attestation    Ann Lemus was seen by the resident in Continuity of Care Clinic. I, Ruthie Whitmore, reviewed the history and directly supervised the examination and procedure. The assessment and plan were made jointly between myself and Dr. Bowers.    Ruthie Whitmore MD  [facility], Ob/Gyn  04/03/2025 3:51 PM

## 2025-04-01 ENCOUNTER — OFFICE VISIT (OUTPATIENT)
Dept: OBGYN | Facility: CLINIC | Age: 25
End: 2025-04-01
Attending: NURSE PRACTITIONER
Payer: COMMERCIAL

## 2025-04-01 VITALS
SYSTOLIC BLOOD PRESSURE: 106 MMHG | WEIGHT: 146.6 LBS | BODY MASS INDEX: 25.03 KG/M2 | HEART RATE: 71 BPM | HEIGHT: 64 IN | DIASTOLIC BLOOD PRESSURE: 64 MMHG

## 2025-04-01 DIAGNOSIS — N93.9 ABNORMAL UTERINE BLEEDING (AUB): Primary | ICD-10-CM

## 2025-04-01 DIAGNOSIS — N83.202 BILATERAL OVARIAN CYSTS: ICD-10-CM

## 2025-04-01 DIAGNOSIS — N83.201 BILATERAL OVARIAN CYSTS: ICD-10-CM

## 2025-04-01 PROCEDURE — 250N000013 HC RX MED GY IP 250 OP 250 PS 637

## 2025-04-01 PROCEDURE — T1013 SIGN LANG/ORAL INTERPRETER: HCPCS | Mod: U4

## 2025-04-01 PROCEDURE — 250N000011 HC RX IP 250 OP 636

## 2025-04-01 RX ORDER — IBUPROFEN 200 MG
600 TABLET ORAL ONCE
Status: COMPLETED | OUTPATIENT
Start: 2025-04-01 | End: 2025-04-01

## 2025-04-01 RX ORDER — METRONIDAZOLE 500 MG/1
500 TABLET ORAL
COMMUNITY
Start: 2025-03-11

## 2025-04-01 RX ADMIN — LEVONORGESTREL 1 EACH: 52 INTRAUTERINE DEVICE INTRAUTERINE at 16:42

## 2025-04-01 RX ADMIN — IBUPROFEN 600 MG: 200 TABLET, FILM COATED ORAL at 16:50

## 2025-04-01 ASSESSMENT — PATIENT HEALTH QUESTIONNAIRE - PHQ9: SUM OF ALL RESPONSES TO PHQ QUESTIONS 1-9: 8

## 2025-04-01 NOTE — PATIENT INSTRUCTIONS
Thank you for trusting us with your care!   Please be aware, if you are on Mychart, you may see your results prior to your providers review. If labs are abnormal, we will call or message you on [facility] with a follow up plan.    If you need to contact us for questions about:  Symptoms, Scheduling & Medical Questions; Non-urgent (2-3 day response) [employer]. message, Urgent (needing response today) [phone] (if after 3:30pm next day response)   Prescriptions: Please call your Pharmacy   Billing: [facility] [phone] or [facility]:[phone]

## 2025-04-01 NOTE — NURSING NOTE
Chief Complaint   Patient presents with    New Patient     Bilateral ovarian cyst       used:    ID# [id number]

## 2025-05-02 ENCOUNTER — APPOINTMENT (OUTPATIENT)
Dept: GENERAL RADIOLOGY | Facility: CLINIC | Age: 25
End: 2025-05-02
Attending: PHYSICIAN ASSISTANT
Payer: COMMERCIAL

## 2025-05-02 ENCOUNTER — HOSPITAL ENCOUNTER (EMERGENCY)
Facility: CLINIC | Age: 25
Discharge: HOME | End: 2025-05-02
Attending: PHYSICIAN ASSISTANT | Admitting: PHYSICIAN ASSISTANT
Payer: COMMERCIAL

## 2025-05-02 ENCOUNTER — APPOINTMENT (OUTPATIENT)
Dept: ULTRASOUND IMAGING | Facility: CLINIC | Age: 25
End: 2025-05-02
Attending: PHYSICIAN ASSISTANT
Payer: COMMERCIAL

## 2025-05-02 VITALS
RESPIRATION RATE: 20 BRPM | SYSTOLIC BLOOD PRESSURE: 103 MMHG | BODY MASS INDEX: 28.63 KG/M2 | DIASTOLIC BLOOD PRESSURE: 56 MMHG | OXYGEN SATURATION: 99 % | WEIGHT: 142 LBS | TEMPERATURE: 97.4 F | HEART RATE: 58 BPM | HEIGHT: 59 IN

## 2025-05-02 DIAGNOSIS — R11.0 NAUSEA: ICD-10-CM

## 2025-05-02 DIAGNOSIS — R07.89 ATYPICAL CHEST PAIN: ICD-10-CM

## 2025-05-02 DIAGNOSIS — N84.0 ENDOMETRIAL POLYP: ICD-10-CM

## 2025-05-02 DIAGNOSIS — N93.9 VAGINAL BLEEDING: ICD-10-CM

## 2025-05-02 LAB
ABO + RH BLD: NORMAL
ALBUMIN SERPL BCG-MCNC: 4.9 G/DL (ref 3.5–5.2)
ALP SERPL-CCNC: 119 U/L (ref 40–150)
ALT SERPL W P-5'-P-CCNC: 16 U/L (ref 0–50)
ANION GAP SERPL CALCULATED.3IONS-SCNC: 12 MMOL/L (ref 7–15)
APTT PPP: 27 SECONDS (ref 22–38)
AST SERPL W P-5'-P-CCNC: 19 U/L (ref 0–45)
ATRIAL RATE - MUSE: 61 BPM
BASOPHILS # BLD AUTO: 0 10E3/UL (ref 0–0.2)
BASOPHILS NFR BLD AUTO: 0 %
BILIRUB SERPL-MCNC: 0.2 MG/DL
BLD GP AB SCN SERPL QL: NEGATIVE
BUN SERPL-MCNC: 14.3 MG/DL (ref 6–20)
CALCIUM SERPL-MCNC: 9.5 MG/DL (ref 8.8–10.4)
CHLORIDE SERPL-SCNC: 102 MMOL/L (ref 98–107)
CREAT SERPL-MCNC: 0.54 MG/DL (ref 0.51–0.95)
DIASTOLIC BLOOD PRESSURE - MUSE: NORMAL MMHG
EGFRCR SERPLBLD CKD-EPI 2021: >90 ML/MIN/1.73M2
EOSINOPHIL # BLD AUTO: 0.1 10E3/UL (ref 0–0.7)
EOSINOPHIL NFR BLD AUTO: 1 %
ERYTHROCYTE [DISTWIDTH] IN BLOOD BY AUTOMATED COUNT: 12.4 % (ref 10–15)
GLUCOSE SERPL-MCNC: 93 MG/DL (ref 70–99)
HCG SERPL QL: NEGATIVE
HCO3 SERPL-SCNC: 26 MMOL/L (ref 22–29)
HCT VFR BLD AUTO: 35.9 % (ref 35–47)
HGB BLD-MCNC: 12.5 G/DL (ref 11.7–15.7)
IMM GRANULOCYTES # BLD: 0 10E3/UL
IMM GRANULOCYTES NFR BLD: 0 %
INR PPP: 1.03 (ref 0.85–1.15)
INTERPRETATION ECG - MUSE: NORMAL
LYMPHOCYTES # BLD AUTO: 2.8 10E3/UL (ref 0.8–5.3)
LYMPHOCYTES NFR BLD AUTO: 41 %
MCH RBC QN AUTO: 31.3 PG (ref 26.5–33)
MCHC RBC AUTO-ENTMCNC: 34.8 G/DL (ref 31.5–36.5)
MCV RBC AUTO: 90 FL (ref 78–100)
MONOCYTES # BLD AUTO: 0.4 10E3/UL (ref 0–1.3)
MONOCYTES NFR BLD AUTO: 7 %
NEUTROPHILS # BLD AUTO: 3.4 10E3/UL (ref 1.6–8.3)
NEUTROPHILS NFR BLD AUTO: 50 %
NRBC # BLD AUTO: 0 10E3/UL
NRBC BLD AUTO-RTO: 0 /100
P AXIS - MUSE: 15 DEGREES
PLATELET # BLD AUTO: 293 10E3/UL (ref 150–450)
POTASSIUM SERPL-SCNC: 3.8 MMOL/L (ref 3.4–5.3)
PR INTERVAL - MUSE: 154 MS
PROT SERPL-MCNC: 7.8 G/DL (ref 6.4–8.3)
PROTHROMBIN TIME: 13.3 SECONDS (ref 11.8–14.8)
QRS DURATION - MUSE: 92 MS
QT - MUSE: 368 MS
QTC - MUSE: 370 MS
R AXIS - MUSE: 54 DEGREES
RBC # BLD AUTO: 4 10E6/UL (ref 3.8–5.2)
SODIUM SERPL-SCNC: 140 MMOL/L (ref 135–145)
SPECIMEN EXP DATE BLD: NORMAL
SYSTOLIC BLOOD PRESSURE - MUSE: NORMAL MMHG
T AXIS - MUSE: 21 DEGREES
TROPONIN T SERPL HS-MCNC: <6 NG/L
VENTRICULAR RATE- MUSE: 61 BPM
WBC # BLD AUTO: 6.8 10E3/UL (ref 4–11)

## 2025-05-02 PROCEDURE — 71046 X-RAY EXAM CHEST 2 VIEWS: CPT

## 2025-05-02 PROCEDURE — 86900 BLOOD TYPING SEROLOGIC ABO: CPT | Performed by: PHYSICIAN ASSISTANT

## 2025-05-02 PROCEDURE — 93005 ELECTROCARDIOGRAM TRACING: CPT

## 2025-05-02 PROCEDURE — 76830 TRANSVAGINAL US NON-OB: CPT

## 2025-05-02 PROCEDURE — 96375 TX/PRO/DX INJ NEW DRUG ADDON: CPT

## 2025-05-02 PROCEDURE — 36415 COLL VENOUS BLD VENIPUNCTURE: CPT | Performed by: PHYSICIAN ASSISTANT

## 2025-05-02 PROCEDURE — 99285 EMERGENCY DEPT VISIT HI MDM: CPT | Mod: 25

## 2025-05-02 PROCEDURE — 84703 CHORIONIC GONADOTROPIN ASSAY: CPT | Performed by: PHYSICIAN ASSISTANT

## 2025-05-02 PROCEDURE — 96374 THER/PROPH/DIAG INJ IV PUSH: CPT | Mod: 59

## 2025-05-02 PROCEDURE — 85730 THROMBOPLASTIN TIME PARTIAL: CPT | Performed by: PHYSICIAN ASSISTANT

## 2025-05-02 PROCEDURE — 250N000011 HC RX IP 250 OP 636: Mod: JZ | Performed by: PHYSICIAN ASSISTANT

## 2025-05-02 PROCEDURE — 84155 ASSAY OF PROTEIN SERUM: CPT | Performed by: PHYSICIAN ASSISTANT

## 2025-05-02 PROCEDURE — 96361 HYDRATE IV INFUSION ADD-ON: CPT

## 2025-05-02 PROCEDURE — 85004 AUTOMATED DIFF WBC COUNT: CPT | Performed by: PHYSICIAN ASSISTANT

## 2025-05-02 PROCEDURE — 85610 PROTHROMBIN TIME: CPT | Performed by: PHYSICIAN ASSISTANT

## 2025-05-02 PROCEDURE — 258N000003 HC RX IP 258 OP 636: Performed by: PHYSICIAN ASSISTANT

## 2025-05-02 PROCEDURE — 84484 ASSAY OF TROPONIN QUANT: CPT | Performed by: PHYSICIAN ASSISTANT

## 2025-05-02 RX ORDER — ONDANSETRON 2 MG/ML
4 INJECTION INTRAMUSCULAR; INTRAVENOUS ONCE
Status: COMPLETED | OUTPATIENT
Start: 2025-05-02 | End: 2025-05-02

## 2025-05-02 RX ORDER — KETOROLAC TROMETHAMINE 15 MG/ML
15 INJECTION, SOLUTION INTRAMUSCULAR; INTRAVENOUS ONCE
Status: COMPLETED | OUTPATIENT
Start: 2025-05-02 | End: 2025-05-02

## 2025-05-02 RX ORDER — ONDANSETRON 4 MG/1
4 TABLET, ORALLY DISINTEGRATING ORAL EVERY 6 HOURS PRN
Qty: 10 TABLET | Refills: 0 | Status: SHIPPED | OUTPATIENT
Start: 2025-05-02 | End: 2025-05-05

## 2025-05-02 RX ORDER — IBUPROFEN 600 MG/1
600 TABLET, FILM COATED ORAL EVERY 8 HOURS PRN
Qty: 30 TABLET | Refills: 0 | Status: SHIPPED | OUTPATIENT
Start: 2025-05-02

## 2025-05-02 RX ADMIN — ONDANSETRON 4 MG: 2 INJECTION, SOLUTION INTRAMUSCULAR; INTRAVENOUS at 18:53

## 2025-05-02 RX ADMIN — KETOROLAC TROMETHAMINE 15 MG: 15 INJECTION, SOLUTION INTRAMUSCULAR; INTRAVENOUS at 17:18

## 2025-05-02 RX ADMIN — SODIUM CHLORIDE 1000 ML: 0.9 INJECTION, SOLUTION INTRAVENOUS at 17:23

## 2025-05-02 ASSESSMENT — ACTIVITIES OF DAILY LIVING (ADL)
ADLS_ACUITY_SCORE: 50

## 2025-05-02 NOTE — ED TRIAGE NOTES
Patient has been having vaginal bleeding for 2 months since she had an IUD placed. Had it removed 2 days ago and is having heavy bleeding.   States she is filling up 1 pad every couple of hours and is feeling weak.  She is also passing clots.   Endorses cramping.

## 2025-05-02 NOTE — ED PROVIDER NOTES
Emergency Department Note      History of Present Illness     Chief Complaint   Vaginal Bleeding      HPI   Ann Lemus is a  24 year old female who presents to the ED for evaluation of vaginal bleeding. The patient states she has had vaginal bleeding and lower abdominal pain for the past 2 months. She has been seen for this and had her IUD removed 2 weeks ago. Since then, the bleeding and pain has worsened. She is now having lightheadedness, generalized weakness, and BL blurry vision. Patient not on blood thinners or TXA. Also reports chest pain and sob.  No leg swelling or calf pain.  No cough, hemoptysis,  No hx of Heart disease or clot.  No recent surgery or overnight hospitalization.  No numbness to extremities. Chest pain does not radiate.  Not sure of anything that makes it better or worse.  Not currently on anything for bleeding.  No meds for pain or nausea prior to arrival.  No vomiting. No fever or purulent drainage.    Independent Historian   History provided with the assistance of a professional .     Review of External Notes   I reviewed the Family Medicine note from 25. TSH done and within normal range.  Hgb normal.  IUD removed.  Past Medical History     Medical History and Problem List   Anxiety  Depression  Ruptured ectopic pregnancy     Medications   Maalox  Famotidine  Fluticasone  Omeprazole  Sertraline  Unisom  Hydroxyzine    Surgical History    section  Salpingectomy (R)    Physical Exam     Patient Vitals for the past 24 hrs:   BP Temp Temp src Pulse Resp SpO2 Height Weight   25 2042 103/56 97.4  F (36.3  C) -- 58 20 99 % -- --   25 1905 -- -- -- 58 20 99 % -- --   25 1900 103/56 97.4  F (36.3  C) Oral 56 19 99 % -- --   25 1845 90/48 -- -- 60 17 99 % -- --   25 1735 -- -- -- -- 18 99 % -- --   25 1700 90/55 -- -- 56 22 98 % -- --   25 1644 105/66 97.5  F (36.4  C) Temporal 62 12 99 % 1.5 m  "(4' 11.06\") 64.4 kg (142 lb)     Physical Exam  General: Awake, alert, non-toxic.  Head:  Scalp is NC/AT  Eyes:  Conjunctiva normal, PERRL  ENT:  The external nose and ears are normal.     Oropharynx clear, uvula midline.  Neck:  Normal range of motion without rigidity.  CV:  Regular rate and rhythm    No pathologic murmur, rubs, or gallops.  Resp:  Breath sounds are clear bilaterally    Non-labored, no retractions or accessory muscle use  Abdomen: Abdomen is soft, no distension, no tenderness, no masses. No CVA tenderness.  MS:  No lower extremity edema/swelling. No midline cervical, thoracic, or lumbar tenderness.  Extremities without joint swelling or redness.  Skin:  Warm and dry, No rash or lesions noted.  Neuro:  Alert and oriented.  GCS 15 Moves all extremities normal.  No facial asymmetry. Gait normal.  Psych:  Awake. Alert. Normal affect. Appropriate interactions.      Diagnostics     Lab Results   Labs Ordered and Resulted from Time of ED Arrival to Time of ED Departure   COMPREHENSIVE METABOLIC PANEL - Normal       Result Value    Sodium 140      Potassium 3.8      Carbon Dioxide (CO2) 26      Anion Gap 12      Urea Nitrogen 14.3      Creatinine 0.54      GFR Estimate >90      Calcium 9.5      Chloride 102      Glucose 93      Alkaline Phosphatase 119      AST 19      ALT 16      Protein Total 7.8      Albumin 4.9      Bilirubin Total 0.2     HCG QUALITATIVE PREGNANCY - Normal    hCG Serum Qualitative Negative     INR - Normal    INR 1.03      PT 13.3     PARTIAL THROMBOPLASTIN TIME - Normal    aPTT 27     TROPONIN T, HIGH SENSITIVITY - Normal    Troponin T, High Sensitivity <6     CBC WITH PLATELETS AND DIFFERENTIAL    WBC Count 6.8      RBC Count 4.00      Hemoglobin 12.5      Hematocrit 35.9      MCV 90      MCH 31.3      MCHC 34.8      RDW 12.4      Platelet Count 293      % Neutrophils 50      % Lymphocytes 41      % Monocytes 7      % Eosinophils 1      % Basophils 0      % Immature Granulocytes 0   "    NRBCs per 100 WBC 0      Absolute Neutrophils 3.4      Absolute Lymphocytes 2.8      Absolute Monocytes 0.4      Absolute Eosinophils 0.1      Absolute Basophils 0.0      Absolute Immature Granulocytes 0.0      Absolute NRBCs 0.0     TYPE AND SCREEN, ADULT    ABO/RH(D) O POS      Antibody Screen Negative      SPECIMEN EXPIRATION DATE [id number]     ABO/RH TYPE AND SCREEN       Imaging   US Pelvis Cmplt w Transvag & Doppler LmtPel Duplex Limited   Final Result   IMPRESSION:     1.  Endometrial polyp measuring 1.4 cm.                Chest XR,  PA & LAT   Final Result   IMPRESSION: Negative chest.          ECG results from 05/02/25   EKG 12 lead     Value    Systolic Blood Pressure     Diastolic Blood Pressure     Ventricular Rate 61    Atrial Rate 61    NJ Interval 154    QRS Duration 92        QTc 370    P Axis 15    R AXIS 54    T Axis 21    Interpretation ECG      Sinus rhythm with sinus arrhythmia  Normal ECG  When compared with ECG of 09-Feb-2025 21:11,  T wave inversion no longer evident in Anterior leads  Confirmed by GENERATED REPORT, COMPUTER ([id number]),  Giovanna Boudreaux ([id number]) on 5/2/2025 5:34:14 PM             Independent Interpretation   CXR: No pneumothorax, infiltrate, pleural effusion, pulmonary edema, visible rib fracture, cardiomegaly, or mediastinal widening.    ED Course      Medications Administered   Medications   sodium chloride 0.9% BOLUS 1,000 mL (0 mLs Intravenous Stopped 5/2/25 1821)   ketorolac (TORADOL) injection 15 mg (15 mg Intravenous $Given 5/2/25 1718)   ondansetron (ZOFRAN) injection 4 mg (4 mg Intravenous $Given 5/2/25 1853)       Procedures   Procedures     Discussion of Management   None    ED Course   ED Course as of 05/03/25 1111   Fri May 02, 2025   8535 I obtained history and performed a physical exam as noted above.        Additional Documentation  None    Medical Decision Making / Diagnosis     CMS Diagnoses: None    MIPS       None    MDM   24 year old female  presents for eval of vaginal bleeding as well as multiple other sx, chest pain, sob, nausea.  On exam well appearing and non-toxic.  Pt is vitally stable with normal/stable hgb no evidence of excessive hemorrhage/blood loss or indication for transfusion.  Platelets normal and nothing to suggest generalized coagulopathy.  Pt is not pregnant.  US w/polyp could be contributing o/w negative for structural etiologies of bleeding.  Abdominal exam benign and non-tender do not suspect other intra-abdominal catastrophe such as appendicitis, diverticulitis, abscess, perforation etc do not feel advanced imaging indicated at this time.  No clinical evidence of UTI.  Nothing to suggest external trauma/source of bleeding low clinical concern for PID.    Also later endorsing cp and sob.  EKG without evidence of acute ischemia, signs of pericarditis, or arrythmia. HS troponin is undetectable. PERC negative and low risk for PE by Well's criteria and would not pursue further workup at this time.  Chest x-ray shows no evidence of pneumonia, free air, pneumothorax, CHF, or mediastinal widening.  No other high risk factors present to suggest aortic dissection, and feel this is very unlikely at this time. Unclear etiology but no evidence of serious pathology.  For vag bleeding/cramping and polyp will do NSAIDS outpatient OB follow-up will hold on starting meds for now likely some degree of w/drawal bleeding too from just having IUD removed.  PCP follow-up on chest pain and return precautions.    Disposition   The patient was discharged.     Diagnosis     ICD-10-CM    1. Vaginal bleeding  N93.9       2. Endometrial polyp  N84.0       3. Atypical chest pain  R07.89       4. Nausea  R11.0            Discharge Medications   Discharge Medication List as of 5/2/2025  8:34 PM        START taking these medications    Details   !! ibuprofen (ADVIL/MOTRIN) 600 MG tablet Take 1 tablet (600 mg) by mouth every 8 hours as needed for moderate pain or  other (bleeding)., Disp-30 tablet, R-0, E-Prescribe      !! ondansetron (ZOFRAN ODT) 4 MG ODT tab Take 1 tablet (4 mg) by mouth every 6 hours as needed for nausea or vomiting., Disp-10 tablet, R-0, E-Prescribe       !! - Potential duplicate medications found. Please discuss with provider.            Scribe Disclosure:  I, Tracie Jauregui, am serving as a scribe at 4:44 PM on 5/2/2025 to document services personally performed by Vito Proctor PA-C based on my observations and the provider's statements to me.        Vito Proctor PA-C  05/03/25 1114

## 2025-05-06 ENCOUNTER — MEDICAL CORRESPONDENCE (OUTPATIENT)
Dept: HEALTH INFORMATION MANAGEMENT | Facility: CLINIC | Age: 25
End: 2025-05-06
Payer: COMMERCIAL

## 2025-05-06 ENCOUNTER — TRANSCRIBE ORDERS (OUTPATIENT)
Dept: OTHER | Age: 25
End: 2025-05-06

## 2025-05-06 ENCOUNTER — TRANSFERRED RECORDS (OUTPATIENT)
Dept: HEALTH INFORMATION MANAGEMENT | Facility: CLINIC | Age: 25
End: 2025-05-06
Payer: COMMERCIAL

## 2025-05-06 DIAGNOSIS — N84.0 ENDOMETRIAL POLYP: Primary | ICD-10-CM

## 2025-05-12 ENCOUNTER — OFFICE VISIT (OUTPATIENT)
Dept: OBGYN | Facility: CLINIC | Age: 25
End: 2025-05-12
Attending: MIDWIFE
Payer: COMMERCIAL

## 2025-05-12 VITALS
DIASTOLIC BLOOD PRESSURE: 59 MMHG | BODY MASS INDEX: 28.44 KG/M2 | SYSTOLIC BLOOD PRESSURE: 96 MMHG | HEART RATE: 72 BPM | WEIGHT: 141.1 LBS | HEIGHT: 59 IN

## 2025-05-12 DIAGNOSIS — N84.0 ENDOMETRIAL POLYP: Primary | ICD-10-CM

## 2025-05-12 PROCEDURE — G0463 HOSPITAL OUTPT CLINIC VISIT: HCPCS | Performed by: STUDENT IN AN ORGANIZED HEALTH CARE EDUCATION/TRAINING PROGRAM

## 2025-05-12 PROCEDURE — 99214 OFFICE O/P EST MOD 30 MIN: CPT | Performed by: STUDENT IN AN ORGANIZED HEALTH CARE EDUCATION/TRAINING PROGRAM

## 2025-05-12 RX ORDER — ACETAMINOPHEN 325 MG/1
975 TABLET ORAL ONCE
OUTPATIENT
Start: 2025-05-12 | End: 2025-05-12

## 2025-05-12 NOTE — LETTER
2025       RE: Ann Lemus  [address]  [facility][1] [id number]     Dear Colleague,    Thank you for referring your patient, Ann Lemus, to the [facility] [place] at [facility]. Please see a copy of my visit note below.    [facility]  Gynecology Visit    Reason for Visit: menorrhagia, AUB    HPI:    Ann Lemus is a 24 year old , here for ongoing bleeding for the last 2 months. Has had bleeding every day with clots. Has had very significant cramping, leg pain, back pain. [place] has also been painful. Periods really have not been normal since after her delivery in 2024 (CS), however it has been daily bleeding with worsened cramping in the last 2 months.    Prior to her pregnancy, her periods were normal. She had 30 day cycles with 6 days of light bleeding and minimal cramping.    She had a Paragard IUD shortly after her delivery and had that exchanged for a Mirena IUD due to heavy bleeding with the Paragard in 2025. She had the Mirena removed  because her bleeding continued to be heavy.      GYN History  Menses: yes see above  Pap Smears:   - 2022: Pap test NILM  - 5/3/2024: Pap test NILM  Contraception: none currently    OBHx  OB History    Para Term  AB Living   4 2 2 0 2 2   SAB IAB Ectopic Multiple Live Births   1 0 1 0 2      # Outcome Date GA Lbr Mauricio/2nd Weight Sex Type Anes PTL Lv   4 Term 24 39w4d  3.885 kg (8 lb 9 oz) M CS-LTranv Spinal N ALEJANDRO      Name: John Rivas      Apgar1: 8  Apgar5: 9   3 SAB 22     SAB      2 Ectopic 21     ECTOPIC   FD      Birth Comments: right salpingectomy   1 Term 18 39w0d   M CS-Unspec EPI  ALEJANDRO      B[facility] Comments[facility]      Name: Alexandr Monet       Past medical history  Past Medical History:   Diagnosis Date     History  of ectopic pregnancy 10/29/2021    required R salpingectomy due to rupture       Past surgical history  Past Surgical History:   Procedure Laterality Date     C/SECTION, LOW TRANSVERSE Bilateral 2018 due to oligohydramnios      SECTION N/A 2024    Procedure:  section;  Surgeon: Anita Chi MD;  Location:  L+D     LAPAROSCOPIC SALPINGECTOMY Right 2021    d/t ruptured ectopic pregnancy       Medications  No current outpatient medications on file.    Allergies  Allergies   Allergen Reactions     Amoxicillin Rash and Hives       Family history  Family History   Problem Relation Age of Onset     No Known Problems Mother      No Known Problems Father      No Known Problems Maternal Grandmother      No Known Problems Maternal Grandfather      No Known Problems Paternal Grandmother      No Known Problems Paternal Grandfather      No Known Problems Brother      No Known Problems Sister      No Known Problems Sister      No Known Problems Sister      No Known Problems Son        Social history  Social History     Socioeconomic History     Marital status:      Spouse name: Andrew     Number of children: 1     Years of education: Not on file     Highest education level: Not on file   Occupational History     Not on file   Tobacco Use     Smoking status: Never     Smokeless tobacco: Never   Substance and Sexual Activity     Alcohol use: Not Currently     Drug use: Never     Sexual activity: Yes     Partners: Male   Other Topics Concern     Not on file   Social History Narrative     Not on file     Social Drivers of Health     Financial Resource Strain: Not at Risk (3/4/2025)    Received from [employer]    Financial Resource Strain      Financial Resource Strain: 1   Food Insecurity: Not at Risk (3/4/2025)    Received from [employer]    Food Insecurity      Food: 1   Transportation Needs: Not at Risk (3/4/2025)    Received from [employer]    Transportation Needs      Transportation: 1   Physical  "Activity: Not on File (2022)    Received from [employer]    Physical Activity      Physical Activity: 0   Stress: Not on File (2022)    Received from [employer]    Stress      Stress: 0   Social Connections: Not on File (2024)    Received from [employer]    Social Connections      Connectedness: 0   Interpersonal Safety: Not on file   Housing Stability: Not at Risk (3/4/2025)    Received from [employer]    Housing Stability      Housin       Physical Exam  BP 96/59   Pulse 72   Ht 1.5 m (4' 11.06\")   Wt 64 kg (141 lb 1.6 oz)   LMP 2025 (Approximate)   Breastfeeding No   BMI 28.44 kg/m    Gen: Well-appearing, NAD  CV:  Well perfused, RRR, no m/r/g  Pulm: CTAB, no wheezes/crackles  Abd: Soft, non-tender, non-distended  Ext: No LE edema, extremities warm and well perfused    Assessment/Plan:  Annleeann Lemus is a 24 year old  female here to discuss endometrial polyp and menorrhagia/AUB.     AUB-polyp  Menorrhagia  - Patient has had daily bleeding with ehavy cramping for the last 2 months. Pelvic US in ED was notable for a 1.4cm endometrial polyp. Hgb 12.5 (5/2) - no signs of anemia.  - Discussed management with hysteroscopy, D&C. Also discussed that Mirena IUD likely was not effective due to polyp, but once polyp is removed will likely be much more effective. Patient amenable to proceeding with hysteroscopy, D&C and mIUD placement. Discussed risks/benefits including risk of bleeding, infection, and risk of possible uterine perforation which could necessitate additional procedures. Discussed that mIUD usually does have ~3 months of light spotting afterwards, but shouldn't have heavy bleeding as she is now. Case request placed    Pre-op exam  - Patient cleared for surgery today. This visit can serve has her preop H&P.    Ruthie Whitmore MD  [facility], Ob/Gyn  2025 1:48 PM        Again, thank you for allowing me to participate in the care of your patient.  "     Sincerely,    Ruthie Whitmore MD

## 2025-05-12 NOTE — PATIENT INSTRUCTIONS
Thank you for trusting us with your care!   Please be aware, if you are on Mychart, you may see your results prior to your providers review. If labs are abnormal, we will call or message you on [employer] with a follow up plan.    If you need to contact us for questions about:  Symptoms, Scheduling & Medical Questions; Non-urgent (2-3 day response) 7[employer]ide[employer] message, Urgent (needing response today) [phone] (if after 3:30pm next day response)   Prescriptions: Please call your Pharmacy   Billing: Huss[facility] [phone] or [facility]:[phone]

## 2025-05-12 NOTE — NURSING NOTE
Chief Complaint   Patient presents with    Hospital F/U     ED on 05/02/25 for endometrial polyp     Romanian (iPad) audio only  was used during today's visit.    's name: Stuart  ID number: [id number]

## 2025-05-12 NOTE — PROGRESS NOTES
[facility]  Gynecology Visit    Reason for Visit: menorrhagia, AUB    HPI:    Annleeann Lemus is a 24 year old , here for ongoing bleeding for the last 2 months. Has had bleeding every day with clots. Has had very significant cramping, leg pain, back pain. [place] has also been painful. Periods really have not been normal since after her delivery in 2024 (CS), however it has been daily bleeding with worsened cramping in the last 2 months.    Prior to her pregnancy, her periods were normal. She had 30 day cycles with 6 days of light bleeding and minimal cramping.    She had a Paragard IUD shortly after her delivery and had that exchanged for a Mirena IUD due to heavy bleeding with the Paragard in 2025. She had the Mirena removed  because her bleeding continued to be heavy.      GYN History  Menses: yes see above  Pap Smears:   - 2022: Pap test NILM  - 5/3/2024: Pap test NILM  Contraception: none currently    OBHx  OB History    Para Term  AB Living   4 2 2 0 2 2   SAB IAB Ectopic Multiple Live Births   1 0 1 0 2      # Outcome Date GA Lbr Mauricio/2nd Weight Sex Type Anes PTL Lv   4 Term 25 39w4d  3.885 kg (8 lb 9 oz) M CS-LTranv Spinal N ALEJANDRO      Name: John Rivas      Apgar1: 8  Apgar5: 9   3 SAB 23     SAB      2 Ectopic 22     ECTOPIC   FD      Birth Comments: right salpingectomy   1 Term 18 39w0d   M CS-Unspec EPI  ALEJANDRO      Birth Comments: oligohydramnios, in [facility]      Name: Alexandr Monet       Past medical history  Past Medical History:   Diagnosis Date    History of ectopic pregnancy 10/29/2021    required R salpingectomy due to rupture       Past surgical history  Past Surgical History:   Procedure Laterality Date    C/SECTION, LOW TRANSVERSE Bilateral 2018 due to oligohydramnios     SECTION N/A 2024    Procedure:  section;  Surgeon: Anita Chi MD;  Location: [facility]     LAPAROSCOPIC SALPINGECTOMY Right 11/01/2021    d/t ruptured ectopic pregnancy       Medications  No current outpatient medications on file.    Allergies  Allergies   Allergen Reactions    Amoxicillin Rash and Hives       Family history  Family History   Problem Relation Age of Onset    No Known Problems Mother     No Known Problems Father     No Known Problems Maternal Grandmother     No Known Problems Maternal Grandfather     No Known Problems Paternal Grandmother     No Known Problems Paternal Grandfather     No Known Problems Brother     No Known Problems Sister     No Known Problems Sister     No Known Problems Sister     No Known Problems Son        Social history  Social History     Socioeconomic History    Marital status:      Spouse name: Andrew    Number of children: 1    Years of education: Not on file    Highest education level: Not on file   Occupational History    Not on file   Tobacco Use    Smoking status: Never    Smokeless tobacco: Never   Substance and Sexual Activity    Alcohol use: Not Currently    Drug use: Never    Sexual activity: Yes     Partners: Male   Other Topics Concern    Not on file   Social History Narrative    Not on file     Social Drivers of Health     Financial Resource Strain: Not at Risk (3/4/2025)    Received from [employer]    Financial Resource Strain     Financial Resource Strain: 1   Food Insecurity: Not at Risk (3/4/2025)    Received from [employer]    Food Insecurity     Food: 1   Transportation Needs: Not at Risk (3/4/2025)    Received from [employer]    Transportation Needs     Transportation: 1   Physical Activity: Not on File (1/14/2022)    Received from [employer]    Physical Activity     Physical Activity: 0   Stress: Not on File (1/14/2022)    Received from [employer]    Stress     Stress: 0   Social Connections: Not on File (9/20/2024)    Received from [employer]    Social Connections     Connectedness: 0   Interpersonal Safety: Not on file   Housing Stability: Not at Risk (3/4/2025)     "Received from [employer]     [place]ousin       Physical Exam  BP 96/59   Pulse 72   Ht 1.5 m (4' 11.06\")   Wt 64 kg (141 lb 1.6 oz)   LMP 2025 (Approximate)   Breastfeeding No   BMI 28.44 kg/m    Gen: Well-appearing, NAD  CV:  Well perfused, RRR, no m/r/g  Pulm: CTAB, no wheezes/crackles  Abd: Soft, non-tender, non-distended  Ext: No LE edema, extremities warm and well perfused    Assessment/Plan:  Ann Lemus is a 24 year old  female here to discuss endometrial polyp and menorrhagia/AUB.     AUB-polyp  Menorrhagia  - Patient has had daily bleeding with ehavy cramping for the last 2 months. Pelvic US in ED was notable for a 1.4cm endometrial polyp. Hgb 12.5 (5/2) - no signs of anemia.  - Discussed management with hysteroscopy, D&C. Also discussed that Mirena IUD likely was not effective due to polyp, but once polyp is removed will likely be much more effective. Patient amenable to proceeding with hysteroscopy, D&C and mIUD placement. Discussed risks/benefits including risk of bleeding, infection, and risk of possible uterine perforation which could necessitate additional procedures. Discussed that mIUD usually does have ~3 months of light spotting afterwards, but shouldn't have heavy bleeding as she is now. Case request placed    Pre-op exam  - Patient cleared for surgery today. This visit can serve has her preop H&P.    Ruthie Whitmore MD  [facility], Ob/Gyn  2025 1:48 PM      "

## 2025-05-14 ENCOUNTER — TELEPHONE (OUTPATIENT)
Dept: OBGYN | Facility: CLINIC | Age: 25
End: 2025-05-14
Payer: COMMERCIAL

## 2025-05-14 NOTE — TELEPHONE ENCOUNTER
VINAYAK with the assistance of  for patient to call back to schedule surgery    Ophelia H   Surgery Scheduler

## 2025-05-16 PROBLEM — N84.0 ENDOMETRIAL POLYP: Status: ACTIVE | Noted: 2025-05-12

## 2025-05-20 ENCOUNTER — APPOINTMENT (OUTPATIENT)
Dept: INTERPRETER SERVICES | Facility: CLINIC | Age: 25
End: 2025-05-20
Payer: COMMERCIAL

## 2025-05-20 ENCOUNTER — ANESTHESIA EVENT (OUTPATIENT)
Dept: SURGERY | Facility: AMBULATORY SURGERY CENTER | Age: 25
End: 2025-05-20
Payer: COMMERCIAL

## 2025-05-27 ENCOUNTER — HOSPITAL ENCOUNTER (OUTPATIENT)
Facility: AMBULATORY SURGERY CENTER | Age: 25
Discharge: HOME | End: 2025-05-27
Attending: OBSTETRICS & GYNECOLOGY
Payer: COMMERCIAL

## 2025-05-27 ENCOUNTER — ANESTHESIA (OUTPATIENT)
Dept: SURGERY | Facility: AMBULATORY SURGERY CENTER | Age: 25
End: 2025-05-27
Payer: COMMERCIAL

## 2025-05-27 ENCOUNTER — OFFICE VISIT (OUTPATIENT)
Dept: INTERPRETER SERVICES | Facility: CLINIC | Age: 25
End: 2025-05-27

## 2025-05-27 VITALS
TEMPERATURE: 96.8 F | HEIGHT: 59 IN | WEIGHT: 141.1 LBS | SYSTOLIC BLOOD PRESSURE: 97 MMHG | RESPIRATION RATE: 16 BRPM | BODY MASS INDEX: 28.44 KG/M2 | OXYGEN SATURATION: 97 % | DIASTOLIC BLOOD PRESSURE: 60 MMHG | HEART RATE: 64 BPM

## 2025-05-27 DIAGNOSIS — N84.0 ENDOMETRIAL POLYP: ICD-10-CM

## 2025-05-27 LAB
ABO + RH BLD: NORMAL
BASOPHILS # BLD AUTO: 0 10E3/UL (ref 0–0.2)
BASOPHILS NFR BLD AUTO: 0 %
BLD GP AB SCN SERPL QL: NEGATIVE
EOSINOPHIL # BLD AUTO: 0.1 10E3/UL (ref 0–0.7)
EOSINOPHIL NFR BLD AUTO: 2 %
ERYTHROCYTE [DISTWIDTH] IN BLOOD BY AUTOMATED COUNT: 12.7 % (ref 10–15)
HCG UR QL: NEGATIVE
HCT VFR BLD AUTO: 36.4 % (ref 35–47)
HGB BLD-MCNC: 12 G/DL (ref 11.7–15.7)
IMM GRANULOCYTES # BLD: 0 10E3/UL
IMM GRANULOCYTES NFR BLD: 0 %
INTERNAL QC OK POCT: NORMAL
LYMPHOCYTES # BLD AUTO: 2.2 10E3/UL (ref 0.8–5.3)
LYMPHOCYTES NFR BLD AUTO: 42 %
MCH RBC QN AUTO: 29.9 PG (ref 26.5–33)
MCHC RBC AUTO-ENTMCNC: 33 G/DL (ref 31.5–36.5)
MCV RBC AUTO: 91 FL (ref 78–100)
MONOCYTES # BLD AUTO: 0.4 10E3/UL (ref 0–1.3)
MONOCYTES NFR BLD AUTO: 7 %
NEUTROPHILS # BLD AUTO: 2.6 10E3/UL (ref 1.6–8.3)
NEUTROPHILS NFR BLD AUTO: 49 %
NRBC # BLD AUTO: 0 10E3/UL
NRBC BLD AUTO-RTO: 0 /100
PLATELET # BLD AUTO: 290 10E3/UL (ref 150–450)
POCT KIT EXPIRATION DATE: NORMAL
POCT KIT LOT NUMBER: NORMAL
RBC # BLD AUTO: 4.01 10E6/UL (ref 3.8–5.2)
SPECIMEN EXP DATE BLD: NORMAL
WBC # BLD AUTO: 5.3 10E3/UL (ref 4–11)

## 2025-05-27 PROCEDURE — 81025 URINE PREGNANCY TEST: CPT | Performed by: PATHOLOGY

## 2025-05-27 PROCEDURE — 88305 TISSUE EXAM BY PATHOLOGIST: CPT | Mod: TC | Performed by: OBSTETRICS & GYNECOLOGY

## 2025-05-27 PROCEDURE — 88305 TISSUE EXAM BY PATHOLOGIST: CPT | Mod: 26 | Performed by: PATHOLOGY

## 2025-05-27 PROCEDURE — 85025 COMPLETE CBC W/AUTO DIFF WBC: CPT | Performed by: PATHOLOGY

## 2025-05-27 DEVICE — IUD CONTRACEPTIVE DEVICE MIRENA 50419-4230-01: Type: IMPLANTABLE DEVICE | Site: UTERUS | Status: FUNCTIONAL

## 2025-05-27 RX ORDER — ACETAMINOPHEN 325 MG/1
975 TABLET ORAL ONCE
Status: COMPLETED | OUTPATIENT
Start: 2025-05-27 | End: 2025-05-27

## 2025-05-27 RX ORDER — DEXAMETHASONE SODIUM PHOSPHATE 4 MG/ML
INJECTION, SOLUTION INTRA-ARTICULAR; INTRALESIONAL; INTRAMUSCULAR; INTRAVENOUS; SOFT TISSUE PRN
Status: DISCONTINUED | OUTPATIENT
Start: 2025-05-27 | End: 2025-05-27

## 2025-05-27 RX ORDER — GLYCOPYRROLATE 0.2 MG/ML
INJECTION, SOLUTION INTRAMUSCULAR; INTRAVENOUS PRN
Status: DISCONTINUED | OUTPATIENT
Start: 2025-05-27 | End: 2025-05-27

## 2025-05-27 RX ORDER — SODIUM CHLORIDE, SODIUM LACTATE, POTASSIUM CHLORIDE, CALCIUM CHLORIDE 600; 310; 30; 20 MG/100ML; MG/100ML; MG/100ML; MG/100ML
INJECTION, SOLUTION INTRAVENOUS CONTINUOUS
Status: DISCONTINUED | OUTPATIENT
Start: 2025-05-27 | End: 2025-05-27 | Stop reason: HOSPADM

## 2025-05-27 RX ORDER — ACETAMINOPHEN 325 MG/1
975 TABLET ORAL ONCE
Status: DISCONTINUED | OUTPATIENT
Start: 2025-05-27 | End: 2025-05-27 | Stop reason: HOSPADM

## 2025-05-27 RX ORDER — ONDANSETRON 2 MG/ML
4 INJECTION INTRAMUSCULAR; INTRAVENOUS EVERY 30 MIN PRN
Status: DISCONTINUED | OUTPATIENT
Start: 2025-05-27 | End: 2025-05-27 | Stop reason: HOSPADM

## 2025-05-27 RX ORDER — FENTANYL CITRATE 50 UG/ML
25 INJECTION, SOLUTION INTRAMUSCULAR; INTRAVENOUS EVERY 5 MIN PRN
Status: DISCONTINUED | OUTPATIENT
Start: 2025-05-27 | End: 2025-05-27 | Stop reason: HOSPADM

## 2025-05-27 RX ORDER — FENTANYL CITRATE 50 UG/ML
INJECTION, SOLUTION INTRAMUSCULAR; INTRAVENOUS PRN
Status: DISCONTINUED | OUTPATIENT
Start: 2025-05-27 | End: 2025-05-27

## 2025-05-27 RX ORDER — PROPOFOL 10 MG/ML
INJECTION, EMULSION INTRAVENOUS CONTINUOUS PRN
Status: DISCONTINUED | OUTPATIENT
Start: 2025-05-27 | End: 2025-05-27

## 2025-05-27 RX ORDER — KETOROLAC TROMETHAMINE 30 MG/ML
INJECTION, SOLUTION INTRAMUSCULAR; INTRAVENOUS PRN
Status: DISCONTINUED | OUTPATIENT
Start: 2025-05-27 | End: 2025-05-27

## 2025-05-27 RX ORDER — HYDROMORPHONE HYDROCHLORIDE 1 MG/ML
0.4 INJECTION, SOLUTION INTRAMUSCULAR; INTRAVENOUS; SUBCUTANEOUS EVERY 5 MIN PRN
Status: DISCONTINUED | OUTPATIENT
Start: 2025-05-27 | End: 2025-05-27 | Stop reason: HOSPADM

## 2025-05-27 RX ORDER — OXYCODONE HYDROCHLORIDE 5 MG/1
5 TABLET ORAL
Status: DISCONTINUED | OUTPATIENT
Start: 2025-05-27 | End: 2025-05-28 | Stop reason: HOSPADM

## 2025-05-27 RX ORDER — IBUPROFEN 200 MG
800 TABLET ORAL ONCE
Status: DISCONTINUED | OUTPATIENT
Start: 2025-05-27 | End: 2025-05-28 | Stop reason: HOSPADM

## 2025-05-27 RX ORDER — DEXAMETHASONE SODIUM PHOSPHATE 10 MG/ML
4 INJECTION, SOLUTION INTRAMUSCULAR; INTRAVENOUS
Status: DISCONTINUED | OUTPATIENT
Start: 2025-05-27 | End: 2025-05-27 | Stop reason: HOSPADM

## 2025-05-27 RX ORDER — PROPOFOL 10 MG/ML
INJECTION, EMULSION INTRAVENOUS PRN
Status: DISCONTINUED | OUTPATIENT
Start: 2025-05-27 | End: 2025-05-27

## 2025-05-27 RX ORDER — FENTANYL CITRATE 50 UG/ML
50 INJECTION, SOLUTION INTRAMUSCULAR; INTRAVENOUS EVERY 5 MIN PRN
Status: DISCONTINUED | OUTPATIENT
Start: 2025-05-27 | End: 2025-05-27 | Stop reason: HOSPADM

## 2025-05-27 RX ORDER — HYDROMORPHONE HYDROCHLORIDE 1 MG/ML
0.2 INJECTION, SOLUTION INTRAMUSCULAR; INTRAVENOUS; SUBCUTANEOUS EVERY 5 MIN PRN
Status: DISCONTINUED | OUTPATIENT
Start: 2025-05-27 | End: 2025-05-27 | Stop reason: HOSPADM

## 2025-05-27 RX ORDER — LABETALOL HYDROCHLORIDE 5 MG/ML
10 INJECTION, SOLUTION INTRAVENOUS
Status: DISCONTINUED | OUTPATIENT
Start: 2025-05-27 | End: 2025-05-27 | Stop reason: HOSPADM

## 2025-05-27 RX ORDER — LIDOCAINE 40 MG/G
CREAM TOPICAL
Status: DISCONTINUED | OUTPATIENT
Start: 2025-05-27 | End: 2025-05-27 | Stop reason: HOSPADM

## 2025-05-27 RX ORDER — ONDANSETRON 4 MG/1
4 TABLET, ORALLY DISINTEGRATING ORAL EVERY 30 MIN PRN
Status: DISCONTINUED | OUTPATIENT
Start: 2025-05-27 | End: 2025-05-27 | Stop reason: HOSPADM

## 2025-05-27 RX ORDER — ONDANSETRON 2 MG/ML
4 INJECTION INTRAMUSCULAR; INTRAVENOUS EVERY 30 MIN PRN
Status: DISCONTINUED | OUTPATIENT
Start: 2025-05-27 | End: 2025-05-28 | Stop reason: HOSPADM

## 2025-05-27 RX ORDER — NALOXONE HYDROCHLORIDE 0.4 MG/ML
0.1 INJECTION, SOLUTION INTRAMUSCULAR; INTRAVENOUS; SUBCUTANEOUS
Status: DISCONTINUED | OUTPATIENT
Start: 2025-05-27 | End: 2025-05-28 | Stop reason: HOSPADM

## 2025-05-27 RX ORDER — ACETAMINOPHEN 325 MG/1
975 TABLET ORAL ONCE
Status: DISCONTINUED | OUTPATIENT
Start: 2025-05-27 | End: 2025-05-28 | Stop reason: HOSPADM

## 2025-05-27 RX ORDER — ONDANSETRON 2 MG/ML
INJECTION INTRAMUSCULAR; INTRAVENOUS PRN
Status: DISCONTINUED | OUTPATIENT
Start: 2025-05-27 | End: 2025-05-27

## 2025-05-27 RX ORDER — ONDANSETRON 2 MG/ML
4 INJECTION INTRAMUSCULAR; INTRAVENOUS ONCE
Status: COMPLETED | OUTPATIENT
Start: 2025-05-27 | End: 2025-05-27

## 2025-05-27 RX ORDER — LIDOCAINE HYDROCHLORIDE 20 MG/ML
INJECTION, SOLUTION INFILTRATION; PERINEURAL PRN
Status: DISCONTINUED | OUTPATIENT
Start: 2025-05-27 | End: 2025-05-27

## 2025-05-27 RX ORDER — DEXAMETHASONE SODIUM PHOSPHATE 10 MG/ML
4 INJECTION, SOLUTION INTRAMUSCULAR; INTRAVENOUS
Status: DISCONTINUED | OUTPATIENT
Start: 2025-05-27 | End: 2025-05-28 | Stop reason: HOSPADM

## 2025-05-27 RX ORDER — NALOXONE HYDROCHLORIDE 0.4 MG/ML
0.1 INJECTION, SOLUTION INTRAMUSCULAR; INTRAVENOUS; SUBCUTANEOUS
Status: DISCONTINUED | OUTPATIENT
Start: 2025-05-27 | End: 2025-05-27 | Stop reason: HOSPADM

## 2025-05-27 RX ORDER — ONDANSETRON 4 MG/1
4 TABLET, ORALLY DISINTEGRATING ORAL EVERY 30 MIN PRN
Status: DISCONTINUED | OUTPATIENT
Start: 2025-05-27 | End: 2025-05-28 | Stop reason: HOSPADM

## 2025-05-27 RX ORDER — LIDOCAINE HYDROCHLORIDE 10 MG/ML
INJECTION, SOLUTION INFILTRATION; PERINEURAL PRN
Status: DISCONTINUED | OUTPATIENT
Start: 2025-05-27 | End: 2025-05-27 | Stop reason: HOSPADM

## 2025-05-27 RX ORDER — OXYCODONE HYDROCHLORIDE 5 MG/1
10 TABLET ORAL
Status: DISCONTINUED | OUTPATIENT
Start: 2025-05-27 | End: 2025-05-28 | Stop reason: HOSPADM

## 2025-05-27 RX ADMIN — DEXAMETHASONE SODIUM PHOSPHATE 4 MG: 4 INJECTION, SOLUTION INTRA-ARTICULAR; INTRALESIONAL; INTRAMUSCULAR; INTRAVENOUS; SOFT TISSUE at 10:40

## 2025-05-27 RX ADMIN — LIDOCAINE HYDROCHLORIDE 60 MG: 20 INJECTION, SOLUTION INFILTRATION; PERINEURAL at 10:35

## 2025-05-27 RX ADMIN — FENTANYL CITRATE 25 MCG: 50 INJECTION, SOLUTION INTRAMUSCULAR; INTRAVENOUS at 10:47

## 2025-05-27 RX ADMIN — PROPOFOL 150 MCG/KG/MIN: 10 INJECTION, EMULSION INTRAVENOUS at 10:35

## 2025-05-27 RX ADMIN — FENTANYL CITRATE 25 MCG: 50 INJECTION, SOLUTION INTRAMUSCULAR; INTRAVENOUS at 10:35

## 2025-05-27 RX ADMIN — ACETAMINOPHEN 975 MG: 325 TABLET ORAL at 09:50

## 2025-05-27 RX ADMIN — PROPOFOL 30 MG: 10 INJECTION, EMULSION INTRAVENOUS at 10:35

## 2025-05-27 RX ADMIN — GLYCOPYRROLATE 0.2 MG: 0.2 INJECTION, SOLUTION INTRAMUSCULAR; INTRAVENOUS at 10:31

## 2025-05-27 RX ADMIN — ONDANSETRON 4 MG: 2 INJECTION INTRAMUSCULAR; INTRAVENOUS at 11:52

## 2025-05-27 RX ADMIN — KETOROLAC TROMETHAMINE 15 MG: 30 INJECTION, SOLUTION INTRAMUSCULAR; INTRAVENOUS at 10:42

## 2025-05-27 RX ADMIN — SODIUM CHLORIDE, SODIUM LACTATE, POTASSIUM CHLORIDE, CALCIUM CHLORIDE: 600; 310; 30; 20 INJECTION, SOLUTION INTRAVENOUS at 10:01

## 2025-05-27 RX ADMIN — ONDANSETRON 4 MG: 2 INJECTION INTRAMUSCULAR; INTRAVENOUS at 10:40

## 2025-05-27 NOTE — DISCHARGE INSTRUCTIONS
"[facility]  Home Care Following Anesthesia  For 24 hours after surgery:  Get plenty of rest.  A responsible adult must stay with you for at least 24 hours after you leave the surgery center.  Do not drive or use heavy equipment.  If you have weakness or tingling, don't drive or use heavy equipment until this feeling goes away.   Do not drink alcohol.   Avoid strenuous or risky activities.  Ask for help when climbing stairs.  You may feel lightheaded.  IF so, sit for a few minutes before standing.  Have someone help you get up.   If you have nausea (feel sick to your stomach): Drink only clear liquids such as apple juice, ginger ale, broth or 7-Up.  Rest may also help.  Be sure to drink enough fluids.  Move to a regular diet as you feel able.   You may have a slight fever.  Call the doctor if your fever is over 100 F (37.7 C) (taken under the tongue) or lasts longer than 24 hours.  You may have a dry mouth, a sore throat, muscle aches or trouble sleeping. These should go away after 24 hours.  Do not make important or legal decisions.   It is recommended to avoid smoking.        Today you received a Marcaine or bupivacaine block to numb the nerves near your surgery site.  This is a block using local anesthetic or \"numbing\" medication injected around the nerves to anesthetize or \"numb\" the area supplied by those nerves.  This block is injected into the muscle layer near your surgical site.  The medication may numb the location where you had surgery for 6-18 hours, but may last up to 24 hours.  If your surgical site is an arm or leg you should be careful with your affected limb, since it is possible to injure your limb without being aware of it due to the numbing.  Until full feeling returns, you should guard against bumping or hitting your limb, and avoid extreme hot or cold temperatures on the skin.  As the block wears off, the feeling will return as a tingling or prickly " sensation near your surgical site.  You will experience more discomfort from your incision as the feeling returns.  You may want to take a pain pill (a narcotic or Tylenol if this was prescribed by your surgeon) when you start to experience mild pain before the pain beccomes more severe.  If your pain medications do not control your pain you should notifiy your surgeon.    Tips for taking pain medications  To get the best pain relief possible, remember these points:  Take pain medications as directed, before pain becomes severe.  Pain medication can upset your stomach: taking it with food may help.  Constipation is a common side effect of pain medication. Drink plenty of  fluids.  Eat foods high in fiber. Take a stool softener if recommended by your doctor or pharmacist.  Do not drink alcohol, drive or operate machinery while taking pain medications.  Ask about other ways to control pain, such as with heat, ice or relaxation.    Tylenol/Acetaminophen Consumption    If you feel your pain relief is insufficient, you may take Tylenol/Acetaminophen in addition to your narcotic pain medication.   Be careful not to exceed 4,000 mg of Tylenol/Acetaminophen in a 24 hour period from all sources.  If you are taking extra strength Tylenol/acetaminophen (500 mg), the maximum dose is 8 tablets in 24 hours.  If you are taking regular strength acetaminophen (325 mg), the maximum dose is 12 tablets in 24 hours.    Call a doctor for any of the following:  Signs of infection (fever, growing tenderness at the surgery site, a large amount of drainage or bleeding, severe pain, foul-smelling drainage, redness, swelling).  It has been over 8 to 10 hours since surgery and you are still not able to urinate (pass water).  Headache for over 24 hours.  Numbness, tingling or weakness the day after surgery (if you had spinal anesthesia).  Signs of Covid-19 infection (temperature over 100 degrees, shortness of breath, cough, loss of taste/smell,  generalized body aches, persistent headache, chills, sore throat, nausea/vomiting/diarrhea)    Your doctor is:       Dr. Claribel Nazario, OB/GYN: [phone]               After hours and weekends call the hospital @ [phone] and ask for the resident on call for:  OB/GYN  For emergency care, call the:  [facility]:  [phone] (TTY for hearing impaired: [phone])  Tylenol 975 mg given at 10 AM.   Ok to take more after 4 PM.    Toradol 15 mg given at  10:45 AM.  Do not take any NSAIDs for 4 hours.  OK after 2:45 PM.  (Ibuprofen, Advil, Motrin, Naproxen, Aleve).

## 2025-05-27 NOTE — BRIEF OP NOTE
[facility] [place]    Brief Operative Note    Pre-operative diagnosis: Endometrial polyp [N84.0]  Post-operative diagnosis Menorrhagia, normal appearing endometrial cavity     Procedure: HYSTEROSCOPY, WITH DILATION AND CURETTAGE OF UTERUS,  Mirena IUD insertion; Pelvic exam under anesthesia, N/A - Uterus    Surgeon: Surgeons and Role:     * Claribel Nazario MD - Primary  Anesthesia: Choice   Estimated Blood Loss: 5 ml  Fluid defecit: 165 ml  Drains: None  Specimens:   ID Type Source Tests Collected by Time Destination   1 : endometrial curettings Curettings Endometrium SURGICAL PATHOLOGY EXAM Claribel Nazario MD 5/27/2025 10:24 AM      Findings:   Normal appearing endometrial cavity, normal tubal ostia, no evidence of endometrial polyp .  Complications: None.  Implants:  Mirena IUD    Claribel Nazario MD, FACOG

## 2025-05-27 NOTE — ANESTHESIA CARE TRANSFER NOTE
Patient: Ann Riverao Allan    Procedure: Procedure(s):  HYSTEROSCOPY, WITH DILATION AND CURETTAGE OF UTERUS,  Mirena IUD insertion; Pelvic exam under anesthesia       Diagnosis: Endometrial polyp [N84.0]  Diagnosis Additional Information: No value filed.    Anesthesia Type:   MAC     Note:    Oropharynx: oropharynx clear of all foreign objects and spontaneously breathing  Level of Consciousness: awake  Oxygen Supplementation: room air    Independent Airway: airway patency satisfactory and stable  Dentition: dentition unchanged  Vital Signs Stable: post-procedure vital signs reviewed and stable  Report to RN Given: handoff report given  Patient transferred to: Phase II  Comments: VSS and WNL, comfortable, no PONV, report to Comfort RIVAS  Handoff Report: Identifed the Patient, Identified the Reponsible Provider, Reviewed the pertinent medical history, Discussed the surgical course, Reviewed Intra-OP anesthesia mangement and issues during anesthesia, Set expectations for post-procedure period and Allowed opportunity for questions and acknowledgement of understanding      Vitals:  Vitals Value Taken Time   BP 99/59 05/27/25 11:04   Temp     Pulse 66 05/27/25 11:04   Resp     SpO2 97 % 05/27/25 11:05   Vitals shown include unfiled device data.    Electronically Signed By: MIRZA Dozier CRNA  May 27, 2025  11:06 AM

## 2025-05-27 NOTE — ANESTHESIA PREPROCEDURE EVALUATION
Anesthesia Pre-Procedure Evaluation    Patient: Ann Lemus   MRN: [id number] : 2000          Procedure : Procedure(s):  HYSTEROSCOPY, WITH DILATION AND CURETTAGE OF UTERUS USING MORCELLATOR; Mirena IUD insertion; Pelvic exam under anesthesia         Past Medical History:   Diagnosis Date    History of ectopic pregnancy 10/29/2021    required R salpingectomy due to rupture      Past Surgical History:   Procedure Laterality Date    C/SECTION, LOW TRANSVERSE Bilateral 2018 due to oligohydramnios     SECTION N/A 2024    Procedure:  section;  Surgeon: Anita Chi MD;  Location: [facility]    LAPAROSCOPIC SALPINGECTOMY Right 2021    d/t ruptured ectopic pregnancy      Allergies   Allergen Reactions    Amoxicillin Rash and Hives      Social History     Tobacco Use    Smoking status: Never    Smokeless tobacco: Never   Substance Use Topics    Alcohol use: Not Currently      Wt Readings from Last 1 Encounters:   25 64 kg (141 lb 1.6 oz)        Anesthesia Evaluation            ROS/MED HX  ENT/Pulmonary:  - neg pulmonary ROS     Neurologic:  - neg neurologic ROS     Cardiovascular:  - neg cardiovascular ROS     METS/Exercise Tolerance:     Hematologic:  - neg hematologic  ROS     Musculoskeletal:  - neg musculoskeletal ROS     GI/Hepatic:  - neg GI/hepatic ROS     Renal/Genitourinary:  - neg Renal ROS     Endo:  - neg endo ROS     Psychiatric/Substance Use:  - neg psychiatric ROS     Infectious Disease:  - neg infectious disease ROS     Malignancy:  - neg malignancy ROS     Other:  - neg other ROS            Physical Exam  Airway  Mallampati: II  TM distance: >3 FB  Neck ROM: full  Mouth opening: >= 4 cm    Cardiovascular   Rhythm: regular  Rate: normal rate     Dental   (+) Completely normal teeth      Pulmonary Breath sounds clear to auscultation        Neurological   She appears awake, alert and oriented x3.    Other Findings       OUTSIDE LABS:  CBC:   Lab  Results   Component Value Date    WBC 5.3 05/27/2025    WBC 6.8 05/02/2025    HGB 12.0 05/27/2025    HGB 12.5 05/02/2025    HCT 36.4 05/27/2025    HCT 35.9 05/02/2025     05/27/2025     05/02/2025     BMP:   Lab Results   Component Value Date     05/02/2025     02/09/2025    POTASSIUM 3.8 05/02/2025    POTASSIUM 3.9 02/09/2025    CHLORIDE 102 05/02/2025    CHLORIDE 100 02/09/2025    CO2 26 05/02/2025    CO2 26 02/09/2025    BUN 14.3 05/02/2025    BUN 11.2 02/09/2025    CR 0.54 05/02/2025    CR 0.48 (L) 02/09/2025    GLC 93 05/02/2025    GLC 97 02/09/2025     COAGS:   Lab Results   Component Value Date    PTT 27 05/02/2025    INR 1.03 05/02/2025     POC:   Lab Results   Component Value Date    HCG Negative 05/27/2025    HCGS Negative 05/02/2025     HEPATIC:   Lab Results   Component Value Date    ALBUMIN 4.9 05/02/2025    PROTTOTAL 7.8 05/02/2025    ALT 16 05/02/2025    AST 19 05/02/2025    ALKPHOS 119 05/02/2025    BILITOTAL 0.2 05/02/2025     OTHER:   Lab Results   Component Value Date    MANDA 9.5 05/02/2025    LIPASE 22 02/09/2025       Anesthesia Plan    ASA Status:  1      NPO Status: NPO Appropriate   Anesthesia Type: MAC.  Airway: natural airway.  Induction: intravenous.  Maintenance: TIVA.   Techniques and Equipment:       - Monitoring Plan: standard ASA monitoring     Consents    Anesthesia Plan(s) and associated risks, benefits, and realistic alternatives discussed. Questions answered and patient/representative(s) expressed understanding.     - Discussed:     - Discussed with:  Patient        - Pt is DNR/DNI Status: no DNR     Blood Consent:      - Discussed with: not discussed.     Postoperative Care    Pain management: non-narcotic analgesics, plan for postoperative opioid use.     Comments:                   Deonte Noriega MD    I have reviewed the pertinent notes and labs in the chart from the past 30 days and (re)examined the patient.  Any updates or changes from those  "notes are reflected in this note.    Clinically Significant Risk Factors Present on Admission                             # Overweight: Estimated body mass index is 28.45 kg/m  as calculated from the following:    Height as of this encounter: 1.5 m (4' 11.06\").    Weight as of this encounter: 64 kg (141 lb 1.6 oz).                    "

## 2025-05-27 NOTE — ANESTHESIA POSTPROCEDURE EVALUATION
Patient: Ann A Rob Lemus    Procedure: Procedure(s):  HYSTEROSCOPY, WITH DILATION AND CURETTAGE OF UTERUS,  Mirena IUD insertion; Pelvic exam under anesthesia       Anesthesia Type:  MAC    Note:  Disposition: Outpatient   Postop Pain Control: Uneventful            Sign Out: Well controlled pain   PONV: No   Neuro/Psych: Uneventful            Sign Out: Acceptable/Baseline neuro status   Airway/Respiratory: Uneventful            Sign Out: Acceptable/Baseline resp. status   CV/Hemodynamics: Uneventful            Sign Out: Acceptable CV status   Other NRE: NONE   DID A NON-ROUTINE EVENT OCCUR? No           Last vitals:  Vitals Value Taken Time   BP 97/60 05/27/25 11:20   Temp 35.8  C (96.44  F) 05/27/25 11:31   Pulse 64 05/27/25 11:20   Resp 16 05/27/25 11:20   SpO2 96 % 05/27/25 11:31   Vitals shown include unfiled device data.    Electronically Signed By: Deonte Noriega MD  May 27, 2025  2:13 PM

## 2025-05-27 NOTE — OP NOTE
[facility] Gynecology Operative Note    Pre-operative diagnosis: Abnormal uterine bleeding, possible endometrial polyp on ultrasound   Post-operative diagnosis: Same  Abnormal uterine bleeding, normal appearing endometrial cavity with no evidence of endometrial polyp   Procedure: Cystectomy  EUA, diagnostic hysteroscopy, endometrial curettage, Mirena IUD placement.    Surgeon: Claribel Nazario MD   Assistant(s): None   Anesthesia: MAC (monitored anesthesia care) and Paracervical block:  1% plain Lidocaine   Estimated blood loss: 5 ml   Total IV fluids: (See anesthesia record)   Blood transfusion: No transfusion was given during surgery   Total urine output: None   Drains: None   Specimens: Endometrial curettings    Findings: Normal external genitalia, normal vaginal epithelium and cervix, small amount of old blood tinged mucus at the external os, normal appearing endometrial cavity with no polyp or other abnormalities, normal tubal ostia.    Complications: None   Condition: Stable     Indications:  Pt is a 25 y/o  who presented with a history of daily bleeding for 2 months and a possible 1.4 cm fundal endometrial polyp on ultrasound.  Hysteroscopy was recommended.  She desired placement of a Mirena IUD for ongoing cycle control.  Surgical consent was obtained with a  present.     Procedure:  The patient was taken to the operating room with IV running.  She was placed in the dorsal supine position and IV sedation administered.  She was then placed in the dorsal lithotomy position with Nathan stirrups and prepped and draped in the usual sterile fashion.  An appropriate patient safety time out was conducted.  Her cervix was easily visualized with a medium Grave's side opening speculum.  One ml of 1% lidocaine was infiltrated into the anterior lip of the cervix and a single tooth tenaculum applied to that location.  A paracervical block was placed in 4.5 ml of 1% lidocaine on each  side.  Her cervix was very easily serially dilated and the hysteroscope was placed through the cervix into the endometrial cavity without difficulty.  The endometrial cavity was normal appearing with no polyp/fibroid or abnormal appearing endometrial tissue.  Both tubal ostia visualized.   Photographs were taken.  The hysteroscope was removed.  A global sharp curettage for endometrial sampling was performed.    The Mirena IUD was then prepared, placed through the cervix and deployed at the fundus in the usual manor.  The strings were trimmed to mid vagina.  The tenaculum was removed from the cervix and the puncture sites made hemostatic with pressure.  The speculum was removed from the vagina and she was returned to the supine position.   All counts were reported to me as correct x 2 at the conclusion of the procedure.  She tolerated the procedure well with no apparent complications. She was taken to the recovery room awake and in good condition.     Claribel Nazario MD, FACOG

## 2025-05-29 ENCOUNTER — APPOINTMENT (OUTPATIENT)
Dept: INTERPRETER SERVICES | Facility: CLINIC | Age: 25
End: 2025-05-29
Payer: COMMERCIAL

## 2025-05-29 LAB
PATH REPORT.COMMENTS IMP SPEC: NORMAL
PATH REPORT.COMMENTS IMP SPEC: NORMAL
PATH REPORT.FINAL DX SPEC: NORMAL
PATH REPORT.GROSS SPEC: NORMAL
PATH REPORT.MICROSCOPIC SPEC OTHER STN: NORMAL
PATH REPORT.RELEVANT HX SPEC: NORMAL
PHOTO IMAGE: NORMAL

## 2025-06-05 ENCOUNTER — RESULTS FOLLOW-UP (OUTPATIENT)
Dept: OBGYN | Facility: CLINIC | Age: 25
End: 2025-06-05
Payer: COMMERCIAL

## 2025-06-18 ENCOUNTER — MEDICAL CORRESPONDENCE (OUTPATIENT)
Dept: HEALTH INFORMATION MANAGEMENT | Facility: CLINIC | Age: 25
End: 2025-06-18
Payer: COMMERCIAL

## 2025-06-18 ENCOUNTER — TRANSCRIBE ORDERS (OUTPATIENT)
Dept: OTHER | Age: 25
End: 2025-06-18

## 2025-06-18 ENCOUNTER — TRANSFERRED RECORDS (OUTPATIENT)
Dept: HEALTH INFORMATION MANAGEMENT | Facility: CLINIC | Age: 25
End: 2025-06-18
Payer: COMMERCIAL

## 2025-06-18 DIAGNOSIS — Z87.820 HISTORY OF BRAIN CONCUSSION: Primary | ICD-10-CM

## (undated) DEVICE — PREP CHLORAPREP 26ML TINTED HI-LITE ORANGE 930815

## (undated) DEVICE — SU VICRYL 3-0 SH 27" J316H

## (undated) DEVICE — SEAL SET MYOSURE ROD LENS SCOPE SINGLE USE 40-902

## (undated) DEVICE — TAPE MEDIPORE 4"X10YD 2964

## (undated) DEVICE — DRAPE UNDER BUTTOCK 8483

## (undated) DEVICE — SOL NACL 0.9% IRRIG 1000ML BOTTLE 2F7124

## (undated) DEVICE — SOL WATER IRRIG 500ML BOTTLE 2F7113

## (undated) DEVICE — PREP DYNA-HEX 4% CHG SCRUB 4OZ BOTTLE MDS098710

## (undated) DEVICE — DRSG STERI STRIP 1/4X3" R1541

## (undated) DEVICE — KIT PROCEDURE FLUENT IN/OUT FLOWPAK TISS TRAP FLT-112S

## (undated) DEVICE — GLOVE BIOGEL PI MICRO SZ 6.5 48565

## (undated) DEVICE — SOL WATER IRRIG 1000ML BOTTLE 2F7114

## (undated) DEVICE — ESU GROUND PAD UNIVERSAL W/O CORD

## (undated) DEVICE — LINEN C-SECTION 5415

## (undated) DEVICE — SURGICEL POWDER ABSORBABLE HEMOSTAT 3GM 3013SP

## (undated) DEVICE — DRSG TELFA 3X8" 1238

## (undated) DEVICE — CATH TRAY FOLEY 16FR BARDEX W/DRAIN BAG STATLOCK 300316A

## (undated) DEVICE — PACK ASC GYN MINOR P15GMUMA

## (undated) DEVICE — DECANTER BAG 2002S

## (undated) DEVICE — GLOVE BIOGEL PI MICRO INDICATOR UNDERGLOVE SZ 7.0 48970

## (undated) DEVICE — LINEN TOWEL PACK X5 5464

## (undated) DEVICE — PAD CHUX UNDERPAD 30X36" P3036C

## (undated) DEVICE — PACK C-SECTION LF PL15OTA83B

## (undated) DEVICE — GLOVE ORTHO 7 LATEX

## (undated) DEVICE — DRSG GAUZE 4X4" TOPPER

## (undated) DEVICE — PAD FLOOR SURGSAFE 30X40" 83030

## (undated) DEVICE — SOL NACL 0.9% IRRIG 500ML BOTTLE 2F7123

## (undated) DEVICE — SOL NACL 0.9% IRRIG 3000ML BAG 2B7477

## (undated) DEVICE — BLADE CLIPPER 4406

## (undated) RX ORDER — ONDANSETRON 2 MG/ML
INJECTION INTRAMUSCULAR; INTRAVENOUS
Status: DISPENSED
Start: 2024-08-01

## (undated) RX ORDER — LIDOCAINE HYDROCHLORIDE 10 MG/ML
INJECTION, SOLUTION EPIDURAL; INFILTRATION; INTRACAUDAL; PERINEURAL
Status: DISPENSED
Start: 2025-05-27

## (undated) RX ORDER — MORPHINE SULFATE 1 MG/ML
INJECTION, SOLUTION EPIDURAL; INTRATHECAL; INTRAVENOUS
Status: DISPENSED
Start: 2024-08-01

## (undated) RX ORDER — KETOROLAC TROMETHAMINE 30 MG/ML
INJECTION, SOLUTION INTRAMUSCULAR; INTRAVENOUS
Status: DISPENSED
Start: 2025-05-27

## (undated) RX ORDER — OXYTOCIN/0.9 % SODIUM CHLORIDE 30/500 ML
PLASTIC BAG, INJECTION (ML) INTRAVENOUS
Status: DISPENSED
Start: 2024-08-01

## (undated) RX ORDER — PROPOFOL 10 MG/ML
INJECTION, EMULSION INTRAVENOUS
Status: DISPENSED
Start: 2025-05-27

## (undated) RX ORDER — DEXMEDETOMIDINE HYDROCHLORIDE 4 UG/ML
INJECTION, SOLUTION INTRAVENOUS
Status: DISPENSED
Start: 2025-05-27

## (undated) RX ORDER — DEXAMETHASONE SODIUM PHOSPHATE 4 MG/ML
INJECTION, SOLUTION INTRA-ARTICULAR; INTRALESIONAL; INTRAMUSCULAR; INTRAVENOUS; SOFT TISSUE
Status: DISPENSED
Start: 2025-05-27

## (undated) RX ORDER — ACETAMINOPHEN 325 MG/1
TABLET ORAL
Status: DISPENSED
Start: 2025-05-27

## (undated) RX ORDER — FENTANYL CITRATE 50 UG/ML
INJECTION, SOLUTION INTRAMUSCULAR; INTRAVENOUS
Status: DISPENSED
Start: 2025-05-27

## (undated) RX ORDER — IBUPROFEN 200 MG
TABLET ORAL
Status: DISPENSED
Start: 2025-04-01

## (undated) RX ORDER — KETOROLAC TROMETHAMINE 30 MG/ML
INJECTION, SOLUTION INTRAMUSCULAR; INTRAVENOUS
Status: DISPENSED
Start: 2024-08-01

## (undated) RX ORDER — ONDANSETRON 2 MG/ML
INJECTION INTRAMUSCULAR; INTRAVENOUS
Status: DISPENSED
Start: 2025-05-27